# Patient Record
Sex: FEMALE | Race: OTHER | HISPANIC OR LATINO | URBAN - METROPOLITAN AREA
[De-identification: names, ages, dates, MRNs, and addresses within clinical notes are randomized per-mention and may not be internally consistent; named-entity substitution may affect disease eponyms.]

---

## 2019-11-12 ENCOUNTER — EMERGENCY (EMERGENCY)
Facility: HOSPITAL | Age: 59
LOS: 1 days | Discharge: HOME | End: 2019-11-12
Attending: STUDENT IN AN ORGANIZED HEALTH CARE EDUCATION/TRAINING PROGRAM
Payer: MEDICAID

## 2019-11-12 VITALS
OXYGEN SATURATION: 98 % | DIASTOLIC BLOOD PRESSURE: 100 MMHG | TEMPERATURE: 98 F | SYSTOLIC BLOOD PRESSURE: 199 MMHG | RESPIRATION RATE: 20 BRPM | HEART RATE: 107 BPM

## 2019-11-12 LAB
ALBUMIN SERPL ELPH-MCNC: 4.8 G/DL — SIGNIFICANT CHANGE UP (ref 3.3–5.2)
ALP SERPL-CCNC: 116 U/L — SIGNIFICANT CHANGE UP (ref 40–120)
ALT FLD-CCNC: 12 U/L — SIGNIFICANT CHANGE UP
ANION GAP SERPL CALC-SCNC: 14 MMOL/L — SIGNIFICANT CHANGE UP (ref 5–17)
APPEARANCE UR: CLEAR — SIGNIFICANT CHANGE UP
AST SERPL-CCNC: 19 U/L — SIGNIFICANT CHANGE UP
BACTERIA # UR AUTO: NEGATIVE — SIGNIFICANT CHANGE UP
BILIRUB SERPL-MCNC: 0.6 MG/DL — SIGNIFICANT CHANGE UP (ref 0.4–2)
BILIRUB UR-MCNC: NEGATIVE — SIGNIFICANT CHANGE UP
BUN SERPL-MCNC: 12 MG/DL — SIGNIFICANT CHANGE UP (ref 8–20)
CALCIUM SERPL-MCNC: 9.9 MG/DL — SIGNIFICANT CHANGE UP (ref 8.6–10.2)
CHLORIDE SERPL-SCNC: 100 MMOL/L — SIGNIFICANT CHANGE UP (ref 98–107)
CO2 SERPL-SCNC: 26 MMOL/L — SIGNIFICANT CHANGE UP (ref 22–29)
COLOR SPEC: YELLOW — SIGNIFICANT CHANGE UP
CREAT SERPL-MCNC: 0.62 MG/DL — SIGNIFICANT CHANGE UP (ref 0.5–1.3)
DIFF PNL FLD: ABNORMAL
EPI CELLS # UR: SIGNIFICANT CHANGE UP
GLUCOSE SERPL-MCNC: 148 MG/DL — HIGH (ref 70–115)
GLUCOSE UR QL: NEGATIVE MG/DL — SIGNIFICANT CHANGE UP
HCT VFR BLD CALC: 46.6 % — HIGH (ref 34.5–45)
HGB BLD-MCNC: 15.3 G/DL — SIGNIFICANT CHANGE UP (ref 11.5–15.5)
INR BLD: 1.09 RATIO — SIGNIFICANT CHANGE UP (ref 0.88–1.16)
KETONES UR-MCNC: NEGATIVE — SIGNIFICANT CHANGE UP
LEUKOCYTE ESTERASE UR-ACNC: NEGATIVE — SIGNIFICANT CHANGE UP
LIDOCAIN IGE QN: 27 U/L — SIGNIFICANT CHANGE UP (ref 22–51)
MCHC RBC-ENTMCNC: 28.4 PG — SIGNIFICANT CHANGE UP (ref 27–34)
MCHC RBC-ENTMCNC: 32.8 GM/DL — SIGNIFICANT CHANGE UP (ref 32–36)
MCV RBC AUTO: 86.5 FL — SIGNIFICANT CHANGE UP (ref 80–100)
NITRITE UR-MCNC: NEGATIVE — SIGNIFICANT CHANGE UP
PH UR: 8 — SIGNIFICANT CHANGE UP (ref 5–8)
PLATELET # BLD AUTO: 337 K/UL — SIGNIFICANT CHANGE UP (ref 150–400)
POTASSIUM SERPL-MCNC: 3.9 MMOL/L — SIGNIFICANT CHANGE UP (ref 3.5–5.3)
POTASSIUM SERPL-SCNC: 3.9 MMOL/L — SIGNIFICANT CHANGE UP (ref 3.5–5.3)
PROT SERPL-MCNC: 9.3 G/DL — HIGH (ref 6.6–8.7)
PROT UR-MCNC: 15 MG/DL
PROTHROM AB SERPL-ACNC: 12.6 SEC — SIGNIFICANT CHANGE UP (ref 10–12.9)
RBC # BLD: 5.39 M/UL — HIGH (ref 3.8–5.2)
RBC # FLD: 13.1 % — SIGNIFICANT CHANGE UP (ref 10.3–14.5)
RBC CASTS # UR COMP ASSIST: SIGNIFICANT CHANGE UP /HPF (ref 0–4)
SODIUM SERPL-SCNC: 140 MMOL/L — SIGNIFICANT CHANGE UP (ref 135–145)
SP GR SPEC: 1.01 — SIGNIFICANT CHANGE UP (ref 1.01–1.02)
TROPONIN T SERPL-MCNC: <0.01 NG/ML — SIGNIFICANT CHANGE UP (ref 0–0.06)
TSH SERPL-MCNC: 3.23 UIU/ML — SIGNIFICANT CHANGE UP (ref 0.27–4.2)
UROBILINOGEN FLD QL: NEGATIVE MG/DL — SIGNIFICANT CHANGE UP
WBC # BLD: 9.76 K/UL — SIGNIFICANT CHANGE UP (ref 3.8–10.5)
WBC # FLD AUTO: 9.76 K/UL — SIGNIFICANT CHANGE UP (ref 3.8–10.5)
WBC UR QL: SIGNIFICANT CHANGE UP

## 2019-11-12 PROCEDURE — 70450 CT HEAD/BRAIN W/O DYE: CPT | Mod: 26

## 2019-11-12 PROCEDURE — 99218: CPT

## 2019-11-12 PROCEDURE — 93010 ELECTROCARDIOGRAM REPORT: CPT

## 2019-11-12 PROCEDURE — 71046 X-RAY EXAM CHEST 2 VIEWS: CPT | Mod: 26

## 2019-11-12 PROCEDURE — 74174 CTA ABD&PLVS W/CONTRAST: CPT | Mod: 26

## 2019-11-12 PROCEDURE — 71275 CT ANGIOGRAPHY CHEST: CPT | Mod: 26

## 2019-11-12 RX ORDER — HYDRALAZINE HCL 50 MG
5 TABLET ORAL ONCE
Refills: 0 | Status: COMPLETED | OUTPATIENT
Start: 2019-11-12 | End: 2019-11-12

## 2019-11-12 RX ORDER — AMLODIPINE BESYLATE 2.5 MG/1
10 TABLET ORAL ONCE
Refills: 0 | Status: COMPLETED | OUTPATIENT
Start: 2019-11-12 | End: 2019-11-12

## 2019-11-12 RX ADMIN — AMLODIPINE BESYLATE 10 MILLIGRAM(S): 2.5 TABLET ORAL at 20:08

## 2019-11-12 RX ADMIN — Medication 5 MILLIGRAM(S): at 21:26

## 2019-11-12 NOTE — ED STATDOCS - NS_ ATTENDINGSCRIBEDETAILS _ED_A_ED_FT
I, Kwaku Rosales, performed the initial face to face bedside interview with this patient regarding history of present illness, review of symptoms and relevant past medical, social and family history.  I completed an independent physical examination.   The history, relevant review of systems, past medical and surgical history, medical decision making, and physical examination was documented by the scribe in my presence and I attest to the accuracy of the documentation.

## 2019-11-12 NOTE — ED PROVIDER NOTE - NEURO NEGATIVE STATEMENT, MLM
+ dizziness at times, no loss of consciousness, no gait abnormality, no headache, no sensory deficits, and no weakness.

## 2019-11-12 NOTE — ED PROVIDER NOTE - OBJECTIVE STATEMENT
59 year old female coming from [facility] after finding out she had a blood pressure of 220/124 presenting to the ED c/o dizziness. Pt states she has not seen a doctor [place], [place]. States she went [facility] for her routine evaluation and  they found her to have elevated blood pressure. Pt notes over last several months she has been having episodes of posterior neck pain and upper back pain that radiates to both her chest. Pt states the pain is sporadic, nothing makes it better or worse, comes on and resolves. Pt currently denies any symptoms. Denies fevers, HA, dizziness, blurry vision, nausea, vomiting, neck pain, back pain, chest pain, no SOB, edema, numbness or tingling, focal weakness. 59 year old female coming from [facility] after finding out she had a blood pressure of 220/124 presenting to the ED c/o dizziness. Pt states she has not seen a doctor since she has been in the country, which has been over 10 years. States she went  to the [facility] center for her routine evaluation and  they found her to have elevated blood pressure. Pt notes over last several months she has been having episodes of posterior neck pain and upper back pain that radiates to both her chest. Pt states the pain is sporadic, nothing makes it better or worse, comes on and resolves. Pt currently denies any symptoms. Denies fevers, HA, dizziness, blurry vision, nausea, vomiting, neck pain, back pain, chest pain, no SOB, edema, numbness or tingling, focal weakness.

## 2019-11-12 NOTE — ED ADULT TRIAGE NOTE - CHIEF COMPLAINT QUOTE
sent from clinic for  high blood pressure bilateral upper extremities/normal sent from clinic for  high blood pressure, c/o back and neck pain

## 2019-11-12 NOTE — ED STATDOCS - PROGRESS NOTE DETAILS
58 y/o F pt with no significant PMHx presents to the ED due to Hypertension and back pain. Pt states that she went to a clinic today and was told she has increasing high blood pressure. Pt is not currently on medication for her Hypertension. Pt paraspinal and neck tenderness. Pt will be sent to [facility] for monitoring.

## 2019-11-12 NOTE — ED PROVIDER NOTE - CLINICAL SUMMARY MEDICAL DECISION MAKING FREE TEXT BOX
Will perform blood work, EKG, CT scan on pt as well as give medication for patient's blood pressure.

## 2019-11-12 NOTE — ED CDU PROVIDER INITIAL DAY NOTE - OBJECTIVE STATEMENT
59 year old female coming from [facility] after finding out she had a blood pressure of 220/124 presenting to the ED c/o dizziness. Pt states she has not seen a doctor since she has been [place] the country, which has been over 10 years. States she went [facility] for her routine evaluation and  they found her to have elevated blood pressure. Pt notes over last several months she has been having episodes of posterior neck pain and upper back pain that radiates to both her chest. Pt states the pain is sporadic, nothing makes it better or worse, comes on and resolves. Pt currently denies any symptoms. Denies fevers, HA, dizziness, blurry vision, nausea, vomiting, neck pain, back pain, chest pain, no SOB, edema, numbness or tingling, focal weakness.

## 2019-11-13 VITALS
HEART RATE: 96 BPM | SYSTOLIC BLOOD PRESSURE: 196 MMHG | OXYGEN SATURATION: 98 % | DIASTOLIC BLOOD PRESSURE: 98 MMHG | RESPIRATION RATE: 18 BRPM

## 2019-11-13 PROCEDURE — 99291 CRITICAL CARE FIRST HOUR: CPT | Mod: 25

## 2019-11-13 PROCEDURE — 71046 X-RAY EXAM CHEST 2 VIEWS: CPT

## 2019-11-13 PROCEDURE — 71275 CT ANGIOGRAPHY CHEST: CPT

## 2019-11-13 PROCEDURE — 96374 THER/PROPH/DIAG INJ IV PUSH: CPT | Mod: XU

## 2019-11-13 PROCEDURE — 85610 PROTHROMBIN TIME: CPT

## 2019-11-13 PROCEDURE — 83690 ASSAY OF LIPASE: CPT

## 2019-11-13 PROCEDURE — 74174 CTA ABD&PLVS W/CONTRAST: CPT

## 2019-11-13 PROCEDURE — 99217: CPT

## 2019-11-13 PROCEDURE — 70450 CT HEAD/BRAIN W/O DYE: CPT

## 2019-11-13 PROCEDURE — T1013: CPT

## 2019-11-13 PROCEDURE — 81001 URINALYSIS AUTO W/SCOPE: CPT

## 2019-11-13 PROCEDURE — 84484 ASSAY OF TROPONIN QUANT: CPT

## 2019-11-13 PROCEDURE — 85027 COMPLETE CBC AUTOMATED: CPT

## 2019-11-13 PROCEDURE — 80053 COMPREHEN METABOLIC PANEL: CPT

## 2019-11-13 PROCEDURE — 36415 COLL VENOUS BLD VENIPUNCTURE: CPT

## 2019-11-13 PROCEDURE — 84443 ASSAY THYROID STIM HORMONE: CPT

## 2019-11-13 PROCEDURE — 93005 ELECTROCARDIOGRAM TRACING: CPT

## 2019-11-13 RX ORDER — AMLODIPINE BESYLATE 2.5 MG/1
1 TABLET ORAL
Qty: 21 | Refills: 0
Start: 2019-11-13 | End: 2019-12-03

## 2019-11-13 NOTE — ED CDU PROVIDER DISPOSITION NOTE - CLINICAL COURSE
Patient continues to remain comfortable. Patient re-evaluated with the assistance of Danelle. Informed of labs and CT findings and importance of taking medication and follow-up of nodule.

## 2019-11-13 NOTE — ED ADULT NURSE NOTE - OBJECTIVE STATEMENT
Pt comes in for hypertension. Pt VS as noted. Pt denies any chest pain or shortness of breath, denies any pain or discomfort, denies vision changes or headache. Pt ambulatory and independent with steady gait noted.

## 2019-11-13 NOTE — ED CDU PROVIDER DISPOSITION NOTE - PATIENT PORTAL LINK FT
You can access the FollowMyHealth Patient Portal offered by [facility] by registering at the following website: [email or web address]. By joining [facility]’s FollowMyHealth portal, you will also be able to view your health information using other applications (apps) compatible with our system.

## 2019-11-21 NOTE — ED CDU PROVIDER SUBSEQUENT DAY NOTE - MEDICAL DECISION MAKING DETAILS
Patient with likely long standing uncontrolled bp. BP has iproved. Will initiate long term management.

## 2019-12-01 ENCOUNTER — OUTPATIENT (OUTPATIENT)
Dept: OUTPATIENT SERVICES | Facility: HOSPITAL | Age: 59
LOS: 1 days | End: 2019-12-01
Payer: MEDICAID

## 2019-12-01 PROCEDURE — G9001: CPT

## 2019-12-09 DIAGNOSIS — Z71.89 OTHER SPECIFIED COUNSELING: ICD-10-CM

## 2020-01-17 PROBLEM — Z00.00 ENCOUNTER FOR PREVENTIVE HEALTH EXAMINATION: Status: ACTIVE | Noted: 2020-01-17

## 2020-01-21 ENCOUNTER — APPOINTMENT (OUTPATIENT)
Dept: PULMONOLOGY | Facility: CLINIC | Age: 60
End: 2020-01-21

## 2020-03-06 ENCOUNTER — APPOINTMENT (OUTPATIENT)
Dept: PULMONOLOGY | Facility: CLINIC | Age: 60
End: 2020-03-06
Payer: MEDICAID

## 2020-03-06 VITALS — RESPIRATION RATE: 16 BRPM

## 2020-03-06 VITALS
WEIGHT: 192 LBS | DIASTOLIC BLOOD PRESSURE: 82 MMHG | SYSTOLIC BLOOD PRESSURE: 158 MMHG | BODY MASS INDEX: 36.25 KG/M2 | HEIGHT: 61 IN | OXYGEN SATURATION: 99 % | HEART RATE: 105 BPM

## 2020-03-06 DIAGNOSIS — G47.33 OBSTRUCTIVE SLEEP APNEA (ADULT) (PEDIATRIC): ICD-10-CM

## 2020-03-06 DIAGNOSIS — R93.89 ABNORMAL FINDINGS ON DIAGNOSTIC IMAGING OF OTHER SPECIFIED BODY STRUCTURES: ICD-10-CM

## 2020-03-06 DIAGNOSIS — Z86.39 PERSONAL HISTORY OF OTHER ENDOCRINE, NUTRITIONAL AND METABOLIC DISEASE: ICD-10-CM

## 2020-03-06 PROCEDURE — 99204 OFFICE O/P NEW MOD 45 MIN: CPT

## 2020-03-06 NOTE — HISTORY OF PRESENT ILLNESS
[TextBox_4] : A 60-year-old  female who comes for evaluation of an abnormal chest CT scan. She was seen in the [facility] emergency room in November for hypertensive crisis. At the time she was sent for a CT angiogram of the chest to rule out aortic dissection. No dissection was found and she was noted to have a 5 mm right middle lobe subpleural nodule. The patient is from [facility] and is a nonsmoker. She denies known TB exposure. She denies shortness of breath cough or wheeze. There is loud snoring and there is some excessive daytime sleepiness.

## 2020-03-06 NOTE — REASON FOR VISIT
[Consultation] : a consultation [Abnormal CXR/ Chest CT] : an abnormal CXR/ chest CT [Sleep Apnea] : sleep apnea [[employer] ] : provided by [employer]   [FreeTextEntry1] : [id number] [FreeTextEntry2] : Licha [TWNoteComboBox1] : English

## 2020-03-06 NOTE — PHYSICAL EXAM
[No Acute Distress] : no acute distress [Low Lying Soft Palate] : low lying soft palate [Enlarged Base of the Tongue] : enlarged base of the tongue [III] : Mallampati Class: III [Normal Appearance] : normal appearance [No Neck Mass] : no neck mass [Normal Rate/Rhythm] : normal rate/rhythm [Normal S1, S2] : normal s1, s2 [No Murmurs] : no murmurs [No Resp Distress] : no resp distress [Clear to Auscultation Bilaterally] : clear to auscultation bilaterally [No Abnormalities] : no abnormalities [Benign] : benign [Normal Gait] : normal gait [No Clubbing] : no clubbing [No Cyanosis] : no cyanosis [No Edema] : no edema [FROM] : FROM [Normal Color/ Pigmentation] : normal color/ pigmentation [No Focal Deficits] : no focal deficits [Oriented x3] : oriented x3 [Normal Affect] : normal affect [TextBox_2] : Obese

## 2020-03-06 NOTE — ASSESSMENT
[FreeTextEntry1] : Patient's nodule appears benign. Is probably related to old environmental exposure from [place]. There is no evidence for active tuberculosis and in view of her age prophylaxis would not be given.  She is at risk for obstructive sleep apnea especially with hypertension that went out of control. A sleep study has been ordered and I will see her back after that.

## 2020-03-23 ENCOUNTER — APPOINTMENT (OUTPATIENT)
Dept: CARDIOLOGY | Facility: CLINIC | Age: 60
End: 2020-03-23
Payer: MEDICAID

## 2020-03-23 ENCOUNTER — NON-APPOINTMENT (OUTPATIENT)
Age: 60
End: 2020-03-23

## 2020-03-23 VITALS
BODY MASS INDEX: 36.63 KG/M2 | DIASTOLIC BLOOD PRESSURE: 92 MMHG | SYSTOLIC BLOOD PRESSURE: 186 MMHG | TEMPERATURE: 98.2 F | HEIGHT: 61 IN | WEIGHT: 194 LBS | OXYGEN SATURATION: 96 % | HEART RATE: 112 BPM

## 2020-03-23 VITALS — DIASTOLIC BLOOD PRESSURE: 90 MMHG | SYSTOLIC BLOOD PRESSURE: 170 MMHG

## 2020-03-23 VITALS — DIASTOLIC BLOOD PRESSURE: 90 MMHG | SYSTOLIC BLOOD PRESSURE: 180 MMHG

## 2020-03-23 DIAGNOSIS — Z82.49 FAMILY HISTORY OF ISCHEMIC HEART DISEASE AND OTHER DISEASES OF THE CIRCULATORY SYSTEM: ICD-10-CM

## 2020-03-23 DIAGNOSIS — Z72.3 LACK OF PHYSICAL EXERCISE: ICD-10-CM

## 2020-03-23 DIAGNOSIS — Z78.9 OTHER SPECIFIED HEALTH STATUS: ICD-10-CM

## 2020-03-23 DIAGNOSIS — Z01.818 ENCOUNTER FOR OTHER PREPROCEDURAL EXAMINATION: ICD-10-CM

## 2020-03-23 DIAGNOSIS — Z56.0 UNEMPLOYMENT, UNSPECIFIED: ICD-10-CM

## 2020-03-23 PROCEDURE — 99204 OFFICE O/P NEW MOD 45 MIN: CPT

## 2020-03-23 PROCEDURE — 93000 ELECTROCARDIOGRAM COMPLETE: CPT

## 2020-03-23 SDOH — ECONOMIC STABILITY - INCOME SECURITY: UNEMPLOYMENT, UNSPECIFIED: Z56.0

## 2020-05-18 ENCOUNTER — APPOINTMENT (OUTPATIENT)
Dept: CARDIOLOGY | Facility: CLINIC | Age: 60
End: 2020-05-18
Payer: MEDICAID

## 2020-05-18 PROCEDURE — 78452 HT MUSCLE IMAGE SPECT MULT: CPT

## 2020-05-18 PROCEDURE — 93306 TTE W/DOPPLER COMPLETE: CPT

## 2020-05-18 PROCEDURE — 93015 CV STRESS TEST SUPVJ I&R: CPT

## 2020-05-18 PROCEDURE — A9500: CPT

## 2020-05-19 ENCOUNTER — APPOINTMENT (OUTPATIENT)
Dept: CARDIOLOGY | Facility: CLINIC | Age: 60
End: 2020-05-19

## 2020-05-19 DIAGNOSIS — R94.39 ABNORMAL RESULT OF OTHER CARDIOVASCULAR FUNCTION STUDY: ICD-10-CM

## 2020-05-21 PROBLEM — Z01.818 PREOP TESTING: Status: ACTIVE | Noted: 2020-05-21

## 2020-05-21 NOTE — HISTORY OF PRESENT ILLNESS
[FreeTextEntry1] : Translation Provided by Ruby (Our office staff)\par \par HTN\par BP uncontrol.\par Today's BP is elevated. Yet pt states she hasn't taken her BP meds yet. She didn't refill her medications yet.\par \par Pt is on:\par Amlodipine 10 mg daily. \par Lisinopril 40 mg daily\par Metoprolol ER 50 mg daily\par \par Pt hasn’t taken any of them today.\par Pt states in general she is compliant with medications.\par Pt has 11 years h/o HTN on treatment.\par Pt is unable to specify medication history about when meds were started and was added.\par She admits RUBIN. \par Denied dietary indiscretion. \par Denied weight gain. \par Denied  dizziness, orthopnea, pnd, edema, cp, palpitation, nausea, moving, hematuria, melena, syncope,near syncope.\par \par FUNCTIONAL CAPACITY\par Estimated <4.0 METS\par \par CHRONIC, STABLE CONDITIONS\par Abnormal chest CT scan. :5 mm right middle lobe subpleural nodule. \par KIRAN\par DM\par \par

## 2020-05-25 ENCOUNTER — APPOINTMENT (OUTPATIENT)
Dept: DISASTER EMERGENCY | Facility: CLINIC | Age: 60
End: 2020-05-25

## 2020-06-08 PROBLEM — I10 ESSENTIAL (PRIMARY) HYPERTENSION: Chronic | Status: ACTIVE | Noted: 2020-05-27

## 2020-06-08 PROBLEM — E11.9 TYPE 2 DIABETES MELLITUS WITHOUT COMPLICATIONS: Chronic | Status: ACTIVE | Noted: 2020-05-27

## 2020-06-08 PROBLEM — E66.9 OBESITY, UNSPECIFIED: Chronic | Status: ACTIVE | Noted: 2020-05-27

## 2020-06-08 PROBLEM — E78.5 HYPERLIPIDEMIA, UNSPECIFIED: Chronic | Status: ACTIVE | Noted: 2020-05-27

## 2020-06-09 ENCOUNTER — APPOINTMENT (OUTPATIENT)
Dept: DISASTER EMERGENCY | Facility: CLINIC | Age: 60
End: 2020-06-09

## 2020-06-10 LAB — SARS-COV-2 N GENE NPH QL NAA+PROBE: DETECTED

## 2020-06-16 ENCOUNTER — INPATIENT (INPATIENT)
Facility: HOSPITAL | Age: 60
LOS: 2 days | Discharge: HOME | DRG: 246 | End: 2020-06-19
Attending: HOSPITALIST | Admitting: FAMILY MEDICINE
Payer: MEDICAID

## 2020-06-16 VITALS
RESPIRATION RATE: 22 BRPM | HEART RATE: 115 BPM | WEIGHT: 184.97 LBS | OXYGEN SATURATION: 97 % | DIASTOLIC BLOOD PRESSURE: 107 MMHG | SYSTOLIC BLOOD PRESSURE: 235 MMHG | HEIGHT: 66 IN | TEMPERATURE: 98 F

## 2020-06-16 DIAGNOSIS — R07.9 CHEST PAIN, UNSPECIFIED: ICD-10-CM

## 2020-06-16 DIAGNOSIS — R09.89 OTHER SPECIFIED SYMPTOMS AND SIGNS INVOLVING THE CIRCULATORY AND RESPIRATORY SYSTEMS: ICD-10-CM

## 2020-06-16 LAB
ALBUMIN SERPL ELPH-MCNC: 4.4 G/DL — SIGNIFICANT CHANGE UP (ref 3.3–5.2)
ALP SERPL-CCNC: 124 U/L — HIGH (ref 40–120)
ALT FLD-CCNC: 12 U/L — SIGNIFICANT CHANGE UP
ANION GAP SERPL CALC-SCNC: 13 MMOL/L — SIGNIFICANT CHANGE UP (ref 5–17)
APTT BLD: 28.6 SEC — SIGNIFICANT CHANGE UP (ref 27.5–36.3)
AST SERPL-CCNC: 18 U/L — SIGNIFICANT CHANGE UP
BASOPHILS # BLD AUTO: 0.04 K/UL — SIGNIFICANT CHANGE UP (ref 0–0.2)
BASOPHILS NFR BLD AUTO: 0.5 % — SIGNIFICANT CHANGE UP (ref 0–2)
BILIRUB SERPL-MCNC: 1 MG/DL — SIGNIFICANT CHANGE UP (ref 0.4–2)
BLD GP AB SCN SERPL QL: SIGNIFICANT CHANGE UP
BUN SERPL-MCNC: 10 MG/DL — SIGNIFICANT CHANGE UP (ref 8–20)
CALCIUM SERPL-MCNC: 9.4 MG/DL — SIGNIFICANT CHANGE UP (ref 8.6–10.2)
CHLORIDE SERPL-SCNC: 101 MMOL/L — SIGNIFICANT CHANGE UP (ref 98–107)
CO2 SERPL-SCNC: 25 MMOL/L — SIGNIFICANT CHANGE UP (ref 22–29)
CREAT SERPL-MCNC: 0.54 MG/DL — SIGNIFICANT CHANGE UP (ref 0.5–1.3)
EOSINOPHIL # BLD AUTO: 0.08 K/UL — SIGNIFICANT CHANGE UP (ref 0–0.5)
EOSINOPHIL NFR BLD AUTO: 0.9 % — SIGNIFICANT CHANGE UP (ref 0–6)
GLUCOSE BLDC GLUCOMTR-MCNC: 123 MG/DL — HIGH (ref 70–99)
GLUCOSE SERPL-MCNC: 121 MG/DL — HIGH (ref 70–99)
HCT VFR BLD CALC: 43.6 % — SIGNIFICANT CHANGE UP (ref 34.5–45)
HGB BLD-MCNC: 14.2 G/DL — SIGNIFICANT CHANGE UP (ref 11.5–15.5)
IMM GRANULOCYTES NFR BLD AUTO: 0.3 % — SIGNIFICANT CHANGE UP (ref 0–1.5)
INR BLD: 1.13 RATIO — SIGNIFICANT CHANGE UP (ref 0.88–1.16)
LYMPHOCYTES # BLD AUTO: 1.73 K/UL — SIGNIFICANT CHANGE UP (ref 1–3.3)
LYMPHOCYTES # BLD AUTO: 19.5 % — SIGNIFICANT CHANGE UP (ref 13–44)
MAGNESIUM SERPL-MCNC: 2.1 MG/DL — SIGNIFICANT CHANGE UP (ref 1.6–2.6)
MCHC RBC-ENTMCNC: 28.5 PG — SIGNIFICANT CHANGE UP (ref 27–34)
MCHC RBC-ENTMCNC: 32.6 GM/DL — SIGNIFICANT CHANGE UP (ref 32–36)
MCV RBC AUTO: 87.6 FL — SIGNIFICANT CHANGE UP (ref 80–100)
MONOCYTES # BLD AUTO: 0.46 K/UL — SIGNIFICANT CHANGE UP (ref 0–0.9)
MONOCYTES NFR BLD AUTO: 5.2 % — SIGNIFICANT CHANGE UP (ref 2–14)
NEUTROPHILS # BLD AUTO: 6.51 K/UL — SIGNIFICANT CHANGE UP (ref 1.8–7.4)
NEUTROPHILS NFR BLD AUTO: 73.6 % — SIGNIFICANT CHANGE UP (ref 43–77)
NT-PROBNP SERPL-SCNC: 272 PG/ML — SIGNIFICANT CHANGE UP (ref 0–300)
PLATELET # BLD AUTO: 293 K/UL — SIGNIFICANT CHANGE UP (ref 150–400)
POTASSIUM SERPL-MCNC: 3.9 MMOL/L — SIGNIFICANT CHANGE UP (ref 3.5–5.3)
POTASSIUM SERPL-SCNC: 3.9 MMOL/L — SIGNIFICANT CHANGE UP (ref 3.5–5.3)
PROT SERPL-MCNC: 8.3 G/DL — SIGNIFICANT CHANGE UP (ref 6.6–8.7)
PROTHROM AB SERPL-ACNC: 12.8 SEC — SIGNIFICANT CHANGE UP (ref 10–12.9)
RBC # BLD: 4.98 M/UL — SIGNIFICANT CHANGE UP (ref 3.8–5.2)
RBC # FLD: 13.1 % — SIGNIFICANT CHANGE UP (ref 10.3–14.5)
SODIUM SERPL-SCNC: 139 MMOL/L — SIGNIFICANT CHANGE UP (ref 135–145)
TROPONIN T SERPL-MCNC: <0.01 NG/ML — SIGNIFICANT CHANGE UP (ref 0–0.06)
WBC # BLD: 8.85 K/UL — SIGNIFICANT CHANGE UP (ref 3.8–10.5)
WBC # FLD AUTO: 8.85 K/UL — SIGNIFICANT CHANGE UP (ref 3.8–10.5)

## 2020-06-16 PROCEDURE — 71045 X-RAY EXAM CHEST 1 VIEW: CPT | Mod: 26

## 2020-06-16 PROCEDURE — 93970 EXTREMITY STUDY: CPT | Mod: 26

## 2020-06-16 PROCEDURE — 99223 1ST HOSP IP/OBS HIGH 75: CPT

## 2020-06-16 PROCEDURE — 71275 CT ANGIOGRAPHY CHEST: CPT | Mod: 26

## 2020-06-16 PROCEDURE — 99285 EMERGENCY DEPT VISIT HI MDM: CPT

## 2020-06-16 RX ORDER — LABETALOL HCL 100 MG
10 TABLET ORAL ONCE
Refills: 0 | Status: COMPLETED | OUTPATIENT
Start: 2020-06-16 | End: 2020-06-16

## 2020-06-16 RX ORDER — SODIUM CHLORIDE 9 MG/ML
1000 INJECTION, SOLUTION INTRAVENOUS
Refills: 0 | Status: DISCONTINUED | OUTPATIENT
Start: 2020-06-16 | End: 2020-06-19

## 2020-06-16 RX ORDER — LABETALOL HCL 100 MG
10 TABLET ORAL EVERY 6 HOURS
Refills: 0 | Status: DISCONTINUED | OUTPATIENT
Start: 2020-06-16 | End: 2020-06-17

## 2020-06-16 RX ORDER — DEXTROSE 50 % IN WATER 50 %
25 SYRINGE (ML) INTRAVENOUS ONCE
Refills: 0 | Status: DISCONTINUED | OUTPATIENT
Start: 2020-06-16 | End: 2020-06-19

## 2020-06-16 RX ORDER — DEXTROSE 50 % IN WATER 50 %
12.5 SYRINGE (ML) INTRAVENOUS ONCE
Refills: 0 | Status: DISCONTINUED | OUTPATIENT
Start: 2020-06-16 | End: 2020-06-19

## 2020-06-16 RX ORDER — INSULIN LISPRO 100/ML
VIAL (ML) SUBCUTANEOUS AT BEDTIME
Refills: 0 | Status: DISCONTINUED | OUTPATIENT
Start: 2020-06-16 | End: 2020-06-19

## 2020-06-16 RX ORDER — NITROGLYCERIN 6.5 MG
0.4 CAPSULE, EXTENDED RELEASE ORAL
Refills: 0 | Status: DISCONTINUED | OUTPATIENT
Start: 2020-06-16 | End: 2020-06-19

## 2020-06-16 RX ORDER — GLUCAGON INJECTION, SOLUTION 0.5 MG/.1ML
1 INJECTION, SOLUTION SUBCUTANEOUS ONCE
Refills: 0 | Status: DISCONTINUED | OUTPATIENT
Start: 2020-06-16 | End: 2020-06-19

## 2020-06-16 RX ORDER — ASPIRIN/CALCIUM CARB/MAGNESIUM 324 MG
81 TABLET ORAL DAILY
Refills: 0 | Status: DISCONTINUED | OUTPATIENT
Start: 2020-06-16 | End: 2020-06-19

## 2020-06-16 RX ORDER — ASPIRIN/CALCIUM CARB/MAGNESIUM 324 MG
325 TABLET ORAL ONCE
Refills: 0 | Status: COMPLETED | OUTPATIENT
Start: 2020-06-16 | End: 2020-06-16

## 2020-06-16 RX ORDER — ATORVASTATIN CALCIUM 80 MG/1
40 TABLET, FILM COATED ORAL AT BEDTIME
Refills: 0 | Status: DISCONTINUED | OUTPATIENT
Start: 2020-06-16 | End: 2020-06-19

## 2020-06-16 RX ORDER — DEXTROSE 50 % IN WATER 50 %
15 SYRINGE (ML) INTRAVENOUS ONCE
Refills: 0 | Status: DISCONTINUED | OUTPATIENT
Start: 2020-06-16 | End: 2020-06-19

## 2020-06-16 RX ORDER — ENOXAPARIN SODIUM 100 MG/ML
40 INJECTION SUBCUTANEOUS DAILY
Refills: 0 | Status: DISCONTINUED | OUTPATIENT
Start: 2020-06-17 | End: 2020-06-17

## 2020-06-16 RX ORDER — INSULIN LISPRO 100/ML
VIAL (ML) SUBCUTANEOUS
Refills: 0 | Status: DISCONTINUED | OUTPATIENT
Start: 2020-06-16 | End: 2020-06-19

## 2020-06-16 RX ORDER — METOPROLOL TARTRATE 50 MG
25 TABLET ORAL EVERY 8 HOURS
Refills: 0 | Status: DISCONTINUED | OUTPATIENT
Start: 2020-06-16 | End: 2020-06-19

## 2020-06-16 RX ADMIN — ATORVASTATIN CALCIUM 40 MILLIGRAM(S): 80 TABLET, FILM COATED ORAL at 20:41

## 2020-06-16 RX ADMIN — Medication 325 MILLIGRAM(S): at 15:41

## 2020-06-16 RX ADMIN — Medication 10 MILLIGRAM(S): at 19:00

## 2020-06-16 RX ADMIN — Medication 10 MILLIGRAM(S): at 21:08

## 2020-06-16 RX ADMIN — Medication 25 MILLIGRAM(S): at 20:42

## 2020-06-16 RX ADMIN — Medication 5 MILLIGRAM(S): at 21:07

## 2020-06-16 RX ADMIN — Medication 10 MILLIGRAM(S): at 15:10

## 2020-06-16 NOTE — CONSULT NOTE ADULT - ASSESSMENT
Due to the nature of this patient's COVID-19 isolation status, no bedside physical exam done to limit spread of infection.  Examination highlights were provided by bedside nurse. Objective data were reviewed in detail. As per chart, Pt is a "60y F w/ hx HTN, HLD, DM, presenting for chest pain.  Pt states that for the past 4 days, she has been having intermittent exertional chest pain described as non-radiating pressure sensation, associated with dyspnea.  Also complains of intermittent headache.  Denies n/v, diaphoresis, palpitations, vision changes, numbness, tingling, weakness.  Denies any symptoms at this time.  Pt was seen at [facility] today, and was sent to the ED for further evaluation.  States that she has been compliant with her medications; did not take her medications yet today.  Of note, pt had symptoms of decreased taste/smell in April 2020, now resolved, and recently tested positive for COVID-19.  Follows with [facility]; had recent echo done". Pt currently in ED, denies current chest pain. ECG-ST HR @ 117 with no ischemic changes, Trop#1-negative, BNP-272, Xray- heart enlarged, slight new congestion. Recently, pt completed stress which showed 5/2020- EF 54%, ischemic ECG changes, severe ischemia in p.LAD territory, mild ischemia in the RCA territory.     Chest Pain-CAD  -Trop#1-negative  -ECG- ST HR @ 117 with no ischemic changes  -Echo-5/2020- EF 50-55%, grade I diastolic dysfx., moderate LVH, normal RV size and fx.,  PA pressure 37.8 borderline pulm. HTN  -Stress- 5/2020- EF 54%, ischemic ECG changes, severe ischemia in p.LAD territory, mild ischemia in the RCA territory  -Cont. to trend trop.  -TSH-ordered  -D-dimer-r/o PE/DVT  -US lower ext.-r/o DVT  -Fasting Lipid Panel in AM  -If D-dimer elevated order CT Angio Chest to r/o PE  -Plan for eventual cath when patient stable in terms of COVID  -Start ASA 81MG (start tomorrow), Lopressor 25mg q 8HR, Lipitor 40mg Due to the nature of this patient's COVID-19 isolation status, no bedside physical exam done to limit spread of infection.  Examination highlights were provided by bedside nurse. Objective data were reviewed in detail. As per chart, Pt is a "60y F w/ hx HTN, HLD, DM, presenting for chest pain.  Pt states that for the past 4 days, she has been having intermittent exertional chest pain described as non-radiating pressure sensation, associated with dyspnea.  Also complains of intermittent headache.  Denies n/v, diaphoresis, palpitations, vision changes, numbness, tingling, weakness.  Denies any symptoms at this time.  Pt was seen at [facility] today, and was sent to the ED for further evaluation.  States that she has been compliant with her medications; did not take her medications yet today.  Of note, pt had symptoms of decreased taste/smell in April 2020, now resolved, and recently tested positive for COVID-19.  Follows with [facility]; had recent echo done". Pt currently in ED, denies current chest pain. ECG-ST HR @ 117 with no ischemic changes, Trop#1-negative, BNP-272, Xray- heart enlarged, slight new congestion. Recently, pt completed stress which showed 5/2020- EF 54%, ischemic ECG changes, severe ischemia in p.LAD territory, mild ischemia in the RCA territory.     Chest Pain-CAD  -Trop#1-negative  -ECG- ST HR @ 117 with no ischemic changes  -Echo-5/2020- EF 50-55%, grade I diastolic dysfx., moderate LVH, normal RV size and fx.,  PA pressure 37.8 borderline pulm. HTN  -Stress- 5/2020- EF 54%, ischemic ECG changes, severe ischemia in p.LAD territory, mild ischemia in the RCA territory  -Cont. to trend trop.  -TSH-ordered  -D-dimer-r/o PE/DVT  -US lower ext.-r/o DVT  -Fasting Lipid Panel in AM  -If D-dimer elevated order CT Angio Chest to r/o PE  -Plan for eventual cath when patient stable in terms of COVID  -Start ASA 81MG (start tomorrow), Lopressor 25mg q 8HR, Lipitor 40mg       COVID19- Positive on 6/10/2020        Elevated D dimer  - Chest CTA for PE to be done.

## 2020-06-16 NOTE — ED PROVIDER NOTE - OBJECTIVE STATEMENT
60y F w/ hx HTN, HLD, DM, presenting for chest pain.  Pt states that for the past 4 days, she has been having intermittent exertional chest pain described as non-radiating pressure sensation, associated with dyspnea.  Also complains of intermittent headache.  Denies n/v, diaphoresis, palpitations, vision changes, numbness, tingling, weakness.  Denies any symptoms at this time.  Pt was seen at [facility] today, and was sent to the ED for further evaluation.  States that she has been compliant with her medications; did not take her medications yet today.  Of note, pt had symptoms of decreased taste/smell in April 2020, now resolved, and recently tested positive for COVID-19.  Follows with [facility]; had recent echo done.

## 2020-06-16 NOTE — ED ADULT TRIAGE NOTE - CHIEF COMPLAINT QUOTE
Patient A&Ox4 complaining of chest pain x3 days, describes as pressure-like & stated gets short of breath when she walks.

## 2020-06-16 NOTE — CONSULT NOTE ADULT - ATTENDING COMMENTS
Patient seen and not examined because of +COVID19 on 6/10/2020.  I have discussed my recommendation with the PA which are outlined above.  Patient is currently not having any angina symptoms.  Will need LHC eventually.    Patient to be followed by Dr Ochoa.

## 2020-06-16 NOTE — ED PROVIDER NOTE - PHYSICAL EXAMINATION
Constitutional: Awake, alert, in no acute distress  Eyes: no scleral icterus  HENT: normocephalic, atraumatic, moist oral mucosa  CV: +tachycardia, regular rhythm, no murmur  Pulm: non-labored respirations, CTAB  Abdomen: soft, non-tender, non-distended  Extremities: no edema, no deformity  Skin: no rash, no jaundice  Neuro: AAOx3, no facial asymmetry, moving all extremities equally, no focal deficits

## 2020-06-16 NOTE — CONSULT NOTE ADULT - SUBJECTIVE AND OBJECTIVE BOX
[facility]                                                               Office:  [address] [address]                                                              Telephone: [phone]. Fax:[phone]                                                                        CARDIOLOGY CONSULTATION NOTE                                                                                             Consult requested by:  Dr. Rod  Reason for Consultation: Chest Pain  History obtained by: Patient and medical record   obtained: No    Chief complaint:    Patient is a 60y old  Female who presents with a chief complaint of       HPI: Due to the nature of this patient's COVID-19 isolation status, no bedside physical exam done to limit spread of infection.  Examination highlights were provided by bedside nurse. Objective data were reviewed in detail. As per chart, Pt is a "60y F w/ hx HTN, HLD, DM, presenting for chest pain.  Pt states that for the past 4 days, she has been having intermittent exertional chest pain described as non-radiating pressure sensation, associated with dyspnea.  Also complains of intermittent headache.  Denies n/v, diaphoresis, palpitations, vision changes, numbness, tingling, weakness.  Denies any symptoms at this time.  Pt was seen at [facility] today, and was sent to the ED for further evaluation.  States that she has been compliant with her medications; did not take her medications yet today.  Of note, pt had symptoms of decreased taste/smell in April 2020, now resolved, and recently tested positive for COVID-19.  Follows with [facility]; had recent echo done". Pt currently in ED, denies current chest pain. ECG-ST HR @ 117 with no ischemic changes, Trop#1-negative, BNP-272, Xray- heart enlarged, slight new congestion. Recently, pt completed stress which showed 5/2020- EF 54%, ischemic ECG changes, severe ischemia in p.LAD territory, mild ischemia in the RCA territory.         REVIEW OF SYMPTOMS:     CONSTITUTIONAL: No fever, weight loss, or fatigue  ENMT:  No difficulty hearing, tinnitus, vertigo; No sinus or throat pain  NECK: No pain or stiffness  CARDIOVASCULAR: See HPI  RESPIRATORY: No Dyspnea on exertion, Shortness of breath, cough, wheezing  : No dysuria, no hematuria   GI: No dark color stool, no melena, no diarrhea, no constipation, no abdominal pain   NEURO: No headache, no dizziness, no slurred speech   MUSCULOSKELETAL: No joint pain or swelling; No muscle, back, or extremity pain  PSYCH: No agitation, no anxiety.    ALL OTHER REVIEW OF SYSTEMS ARE NEGATIVE.      PREVIOUS DIAGNOSTIC TESTING  ECHO FINDINGS: 5/2020- EF 50-55%, grade I diastolic dysfx., moderate LVH, normal RV size and fx.,  PA pressure 37.8 borderline pulm. HTN      STRESS FINDINGS: 5/2020- EF 54%, ischemic ECG changes, severe ischemia in p.LAD territory, mild ischemia in the RCA territory      CATHETERIZATION FINDINGS: eventual         ALLERGIES: Allergies    penicillin (Rash)    Intolerances          PAST MEDICAL HISTORY  Obesity  Hyperlipidemia  Hypertension  Type 2 diabetes mellitus  No pertinent past medical history      PAST SURGICAL HISTORY  No significant past surgical history      FAMILY HISTORY:  FHx: hypertension: Father      SOCIAL HISTORY: As per chart Denies smoking/alcohol/drugs      CURRENT MEDICATIONS:           HOME MEDICATIONS:        Vital Signs Last 24 Hrs  T(C): 36.9 (16 Jun 2020 13:30), Max: 36.9 (16 Jun 2020 13:30)  T(F): 98.5 (16 Jun 2020 13:30), Max: 98.5 (16 Jun 2020 13:30)  HR: 90 (16 Jun 2020 15:37) (90 - 115)  BP: 177/71 (16 Jun 2020 15:37) (177/71 - 235/107)  RR: 26 (16 Jun 2020 14:56) (22 - 26)  SpO2: 96% (16 Jun 2020 15:37) (96% - 97%)      PHYSICAL EXAM:  Due to the nature of this patient's COVID-19 isolation status, no bedside physical exam done to limit spread of infection.  Examination highlights were provided by bedside nurse. Objective data were reviewed in detail.     Intake and output:     LABS:                        14.2   8.85  )-----------( 293      ( 16 Jun 2020 15:10 )             43.6     06-16    139  |  101  |  10.0  ----------------------------<  121<H>  3.9   |  25.0  |  0.54    Ca    9.4      16 Jun 2020 15:10  Mg     2.1     06-16    TPro  8.3  /  Alb  4.4  /  TBili  1.0  /  DBili  x   /  AST  18  /  ALT  12  /  AlkPhos  124<H>  06-16    CARDIAC MARKERS ( 16 Jun 2020 15:10 )  x     / <0.01 ng/mL / x     / x     / x        ;p-BNP=Serum Pro-Brain Natriuretic Peptide: 272 pg/mL (06-16 @ 15:10)    PT/INR - ( 16 Jun 2020 15:11 )   PT: 12.8 sec;   INR: 1.13 ratio         PTT - ( 16 Jun 2020 15:11 )  PTT:28.6 sec      INTERPRETATION OF TELEMETRY: NSR HR @ 90S  ECG: ST HR @ 117     RADIOLOGY & ADDITIONAL STUDIES:    X-ray:  < from: Xray Chest 1 View AP/PA. (06.16.20 @ 15:30) >  Heart is enlarged.    There is a slight central congestive like picture new since November 12, 2019.    IMPRESSION: As above.      < end of copied text >     CT scan: pending [facility]                                                               Office:  [address] [address]                                                              Telephone: [phone]. Fax:[phone]                                                                        CARDIOLOGY CONSULTATION NOTE                                                                                             Consult requested by:  Dr. Rod  Reason for Consultation: Chest Pain  History obtained by: Patient and medical record   obtained: No    Chief complaint:    Patient is a 60y old  Female who presents with a chief complaint of       HPI: Due to the nature of this patient's COVID-19 isolation status, no bedside physical exam done to limit spread of infection.  Examination highlights were provided by bedside nurse. Objective data were reviewed in detail. As per chart, Pt is a "60y F w/ hx HTN, HLD, DM, presenting for chest pain.  Pt states that for the past 4 days, she has been having intermittent exertional chest pain described as non-radiating pressure sensation, associated with dyspnea.  Also complains of intermittent headache.  Denies n/v, diaphoresis, palpitations, vision changes, numbness, tingling, weakness.  Denies any symptoms at this time.  Pt was seen at [facility] today, and was sent to the ED for further evaluation.  States that she has been compliant with her medications; did not take her medications yet today.  Of note, pt had symptoms of decreased taste/smell in April 2020, now resolved, and recently tested positive for COVID-19.  Follows with [facility]; had recent echo done". Pt currently in ED, denies current chest pain. ECG-ST HR @ 117 with no ischemic changes, Trop#1-negative, BNP-272, Xray- heart enlarged, slight new congestion. Recently, pt completed stress which showed 5/2020- EF 54%, ischemic ECG changes, severe ischemia in p.LAD territory, mild ischemia in the RCA territory.         REVIEW OF SYMPTOMS:     CONSTITUTIONAL: No fever, weight loss, or fatigue  ENMT:  No difficulty hearing, tinnitus, vertigo; No sinus or throat pain  NECK: No pain or stiffness  CARDIOVASCULAR: See HPI  RESPIRATORY: No Dyspnea on exertion, Shortness of breath, cough, wheezing  : No dysuria, no hematuria   GI: No dark color stool, no melena, no diarrhea, no constipation, no abdominal pain   NEURO: No headache, no dizziness, no slurred speech   MUSCULOSKELETAL: No joint pain or swelling; No muscle, back, or extremity pain  PSYCH: No agitation, no anxiety.    ALL OTHER REVIEW OF SYSTEMS ARE NEGATIVE.      PREVIOUS DIAGNOSTIC TESTING  ECHO FINDINGS: 5/2020- EF 50-55%, grade I diastolic dysfx., moderate LVH, normal RV size and fx.,  PA pressure 37.8 borderline pulm. HTN      STRESS FINDINGS: 5/2020- EF 54%, ischemic ECG changes, severe ischemia in p.LAD territory, mild ischemia in the RCA territory      CATHETERIZATION FINDINGS: eventual         ALLERGIES: Allergies    penicillin (Rash)    Intolerances          PAST MEDICAL HISTORY  Obesity  Hyperlipidemia  Hypertension  Type 2 diabetes mellitus  No pertinent past medical history      PAST SURGICAL HISTORY  No significant past surgical history      FAMILY HISTORY:  FHx: hypertension: Father      SOCIAL HISTORY: As per chart Denies smoking/alcohol/drugs      CURRENT MEDICATIONS:           HOME MEDICATIONS:        Vital Signs Last 24 Hrs  T(C): 36.9 (16 Jun 2020 13:30), Max: 36.9 (16 Jun 2020 13:30)  T(F): 98.5 (16 Jun 2020 13:30), Max: 98.5 (16 Jun 2020 13:30)  HR: 90 (16 Jun 2020 15:37) (90 - 115)  BP: 177/71 (16 Jun 2020 15:37) (177/71 - 235/107)  RR: 26 (16 Jun 2020 14:56) (22 - 26)  SpO2: 96% (16 Jun 2020 15:37) (96% - 97%)      PHYSICAL EXAM:  Due to the nature of this patient's COVID-19 isolation status, no bedside physical exam done to limit spread of infection.  Examination highlights were provided by bedside nurse. Objective data were reviewed in detail.  ER physicians examination noted.    Intake and output:     LABS:                        14.2   8.85  )-----------( 293      ( 16 Jun 2020 15:10 )             43.6     06-16    139  |  101  |  10.0  ----------------------------<  121<H>  3.9   |  25.0  |  0.54    Ca    9.4      16 Jun 2020 15:10  Mg     2.1     06-16    TPro  8.3  /  Alb  4.4  /  TBili  1.0  /  DBili  x   /  AST  18  /  ALT  12  /  AlkPhos  124<H>  06-16    CARDIAC MARKERS ( 16 Jun 2020 15:10 )  x     / <0.01 ng/mL / x     / x     / x        ;p-BNP=Serum Pro-Brain Natriuretic Peptide: 272 pg/mL (06-16 @ 15:10)    PT/INR - ( 16 Jun 2020 15:11 )   PT: 12.8 sec;   INR: 1.13 ratio         PTT - ( 16 Jun 2020 15:11 )  PTT:28.6 sec      INTERPRETATION OF TELEMETRY: NSR HR @ 90S  ECG: ST HR @ 117     RADIOLOGY & ADDITIONAL STUDIES:    X-ray:  < from: Xray Chest 1 View AP/PA. (06.16.20 @ 15:30) >  Heart is enlarged.    There is a slight central congestive like picture new since November 12, 2019.    IMPRESSION: As above.      < end of copied text >     CT scan: pending

## 2020-06-16 NOTE — ED PROVIDER NOTE - PROGRESS NOTE DETAILS
Pt remains in no distress.  Rpt /71, HR 90.  Consult placed to [facility]. Pt evaluated by [facility], requesting admission for likely cath.

## 2020-06-16 NOTE — ED PROVIDER NOTE - NS ED ROS FT
Constitutional: no fever, no chills  Eyes: no vision changes  ENT: no nasal congestion, no sore throat  CV: +chest pain  Resp: no cough, no shortness of breath  GI: no abdominal pain, no vomiting, no diarrhea  : no dysuria  MSK: no joint pain  Skin: no rash  Neuro: +headache, no weakness, no paresthesias

## 2020-06-16 NOTE — H&P ADULT - NSICDXPASTMEDICALHX_GEN_ALL_CORE_FT
PAST MEDICAL HISTORY:  COVID-19     Hyperlipidemia     Hypertension     Obesity     Type 2 diabetes mellitus

## 2020-06-16 NOTE — ED PROVIDER NOTE - CLINICAL SUMMARY MEDICAL DECISION MAKING FREE TEXT BOX
60y F w/ hx HTN, HLD, DM, presenting for 4 days of intermittent exertional chest pain and dyspnea.  Pt hypertensive to 235/107 and tachycardic to 115 on arrival; however denies any symptoms at this time.  Will tx with labetalol and ASA.  Obtain EKG, CXR, labs.  Will consult [facility].

## 2020-06-16 NOTE — ED PROVIDER NOTE - ATTENDING CONTRIBUTION TO CARE
Joellen: I performed a face to face evaluation of this patient and performed a full history and physical examination on the patient.  I agree with the resident's history, physical examination, and plan of the patient unless otherwise noted. My brief assessment is as follows: hx as documented 3-4 days non radiating chest discomfort, worse with exertion associated with sob when exerting self, sent from [facility] for eval. states had covid symptoms late april, tested positive ?last week. tachycardic, hypertense, with nl sats, currently denying any symptoms. obese, tachy, equal pulses b/l, ctab, reg rate/effort breathing with nl sats, bening abd, no swelling b/l LE, nl neuro.e kg with old twi i/avl, no st e/d. labs, labetalol, cxr, cards consult, reassess.

## 2020-06-16 NOTE — ED ADULT NURSE NOTE - OBJECTIVE STATEMENT
60 F, nad, alert and oriented x 3, Nepalese speaking and  Lupe at bedside to translate. Pt denies CP, denies SOB, denies headache, offers no complaints, states "I was sent here by the clinic for high blood pressure". Pt with known COVID +, cardiac and O2 monitoring in place, fall precautions in place, will monitor.

## 2020-06-16 NOTE — H&P ADULT - HISTORY OF PRESENT ILLNESS
Pt. is a 61 y/o  female who has been having intermittent exertional chest pain in epigastric area described as non-radiating pressure sensation, associated with dyspnea. Denies n/v, diaphoresis, palpitations, vision changes, numbness, tingling, weakness. no abd. pain. no n/v/d. no symptoms at the time of admission. Pt was seen at [facility] today, and was sent to the ED for further evaluation.  States that she has been compliant with her medications; did not take her medications yet today.  Of note, pt had symptoms of decreased taste/smell in April 2020, now resolved, and recently tested positive for COVID-19.  pt. had recent echo done. ECG-ST HR @ 117 with no ischemic changes, Trop#1-negative, BNP-272, Xray- heart enlarged, slight new congestion. Recently, pt completed stress which showed 5/2020- EF 54%, ischemic ECG changes, severe ischemia in p.LAD territory, mild ischemia in the RCA territory.

## 2020-06-16 NOTE — ED ADULT TRIAGE NOTE - NSWEIGHTCALCTOOLDRUG_GEN_A_CORE
----- Message from Kimberly Ferreira sent at 7/16/2019 10:27 AM CDT -----  Contact: pt  Pt is returning staff call    Pt call back [phone]    Thanks      used

## 2020-06-16 NOTE — H&P ADULT - NSHPPHYSICALEXAM_GEN_ALL_CORE
General: Well developed  female lying in bed not in distress.   HEENT: AT, NC. PERRL. intact EOM. no throat erythema or exudate.   Neck: supple. no JVD.   Chest: CTA bilaterally, no w /r/r.   Heart: S1,S2. RRR. no heart murmur. no edema.   Abdomen: soft. non-tender. non-distended. + BS.   Ext: no calf tenderness. ROM of all ext. intact. distal pulses intact.   Neuro: AAO x3. no focal weakness. no speech disorder. CNs intact. m/r/s intact.   Skin: no rash noted, warm and dry.   Psych : co-operative, mood ok, no si/hi.

## 2020-06-17 LAB
A1C WITH ESTIMATED AVERAGE GLUCOSE RESULT: 6.4 % — HIGH (ref 4–5.6)
CHOLEST SERPL-MCNC: 136 MG/DL — SIGNIFICANT CHANGE UP (ref 110–199)
ESTIMATED AVERAGE GLUCOSE: 137 MG/DL — HIGH (ref 68–114)
GLUCOSE BLDC GLUCOMTR-MCNC: 115 MG/DL — HIGH (ref 70–99)
GLUCOSE BLDC GLUCOMTR-MCNC: 126 MG/DL — HIGH (ref 70–99)
GLUCOSE BLDC GLUCOMTR-MCNC: 144 MG/DL — HIGH (ref 70–99)
GLUCOSE BLDC GLUCOMTR-MCNC: 145 MG/DL — HIGH (ref 70–99)
HCV AB S/CO SERPL IA: 0.24 S/CO — SIGNIFICANT CHANGE UP (ref 0–0.99)
HCV AB SERPL-IMP: SIGNIFICANT CHANGE UP
HDLC SERPL-MCNC: 40 MG/DL — LOW
LIPID PNL WITH DIRECT LDL SERPL: 76 MG/DL — SIGNIFICANT CHANGE UP
SARS-COV-2 RNA SPEC QL NAA+PROBE: DETECTED
TOTAL CHOLESTEROL/HDL RATIO MEASUREMENT: 3 RATIO — LOW (ref 3.3–7.1)
TRIGL SERPL-MCNC: 98 MG/DL — SIGNIFICANT CHANGE UP (ref 10–200)
TROPONIN T SERPL-MCNC: <0.01 NG/ML — SIGNIFICANT CHANGE UP (ref 0–0.06)
TROPONIN T SERPL-MCNC: <0.01 NG/ML — SIGNIFICANT CHANGE UP (ref 0–0.06)

## 2020-06-17 PROCEDURE — 93306 TTE W/DOPPLER COMPLETE: CPT | Mod: 26

## 2020-06-17 PROCEDURE — 99233 SBSQ HOSP IP/OBS HIGH 50: CPT | Mod: GC

## 2020-06-17 PROCEDURE — 99232 SBSQ HOSP IP/OBS MODERATE 35: CPT

## 2020-06-17 RX ORDER — HYDRALAZINE HCL 50 MG
10 TABLET ORAL EVERY 6 HOURS
Refills: 0 | Status: DISCONTINUED | OUTPATIENT
Start: 2020-06-17 | End: 2020-06-17

## 2020-06-17 RX ORDER — HYDRALAZINE HCL 50 MG
10 TABLET ORAL EVERY 6 HOURS
Refills: 0 | Status: DISCONTINUED | OUTPATIENT
Start: 2020-06-17 | End: 2020-06-19

## 2020-06-17 RX ORDER — LABETALOL HCL 100 MG
10 TABLET ORAL ONCE
Refills: 0 | Status: COMPLETED | OUTPATIENT
Start: 2020-06-17 | End: 2020-06-17

## 2020-06-17 RX ORDER — METOPROLOL TARTRATE 50 MG
25 TABLET ORAL ONCE
Refills: 0 | Status: COMPLETED | OUTPATIENT
Start: 2020-06-17 | End: 2020-06-17

## 2020-06-17 RX ADMIN — Medication 25 MILLIGRAM(S): at 20:27

## 2020-06-17 RX ADMIN — Medication 25 MILLIGRAM(S): at 06:09

## 2020-06-17 RX ADMIN — Medication 10 MILLIGRAM(S): at 08:44

## 2020-06-17 RX ADMIN — Medication 25 MILLIGRAM(S): at 22:24

## 2020-06-17 RX ADMIN — Medication 10 MILLIGRAM(S): at 09:00

## 2020-06-17 RX ADMIN — ATORVASTATIN CALCIUM 40 MILLIGRAM(S): 80 TABLET, FILM COATED ORAL at 22:24

## 2020-06-17 RX ADMIN — Medication 10 MILLIGRAM(S): at 02:08

## 2020-06-17 RX ADMIN — Medication 25 MILLIGRAM(S): at 16:42

## 2020-06-17 RX ADMIN — Medication 10 MILLIGRAM(S): at 23:54

## 2020-06-17 RX ADMIN — Medication 5 MILLIGRAM(S): at 20:27

## 2020-06-17 RX ADMIN — Medication 5 MILLIGRAM(S): at 13:02

## 2020-06-17 RX ADMIN — ENOXAPARIN SODIUM 40 MILLIGRAM(S): 100 INJECTION SUBCUTANEOUS at 08:43

## 2020-06-17 RX ADMIN — Medication 81 MILLIGRAM(S): at 08:43

## 2020-06-17 NOTE — PROGRESS NOTE ADULT - ATTENDING COMMENTS
Pt is seen, examined, chart reviewed, d/w np/pa.  Management as outlined above.  Chest Pain  NST 5/2020- EF 54%, ischemic ECG changes, severe ischemia in p.LAD territory, mild ischemia in the RCA territory  CT Angio Chest-peripheral PE cannot be excluded, coronary artery calcifications  Plan for cath 6/18

## 2020-06-17 NOTE — CHART NOTE - NSCHARTNOTEFT_GEN_A_CORE
Called by RN to ask about STAT medications ordered by primary team this evening. Per RN STAT Toprol XL and Vasotec ordered this evening was not given and would like to know if medications should be administered. Reviewed pt hx of BP medications received today. Pt admitted for atypical chest pain and hypertensive emergency, cardiology onboard. Pt /94, P 66, asymptomatic. Instructed RN to administer antihypertensives at this time, repeat BP 1 hour and notify PA, continue medications as ordered. RN instructed to continue to monitor pt and notify provider of any changes in pt status.

## 2020-06-17 NOTE — PROGRESS NOTE ADULT - ASSESSMENT
Pt. is a 59 y/o  female who has been having intermittent exertional chest pain in epigastric area described as non-radiating pressure sensation, associated with dyspnea. Denies n/v, diaphoresis, palpitations, vision changes, numbness, tingling, weakness. no abd. pain. no n/v/d. no symptoms at the time of admission. Pt was seen at [facility] yesterday and sent to the ED for further evaluation. Pt states she is compliant with medications and always has difficulty controlling her BP. Of note, pt had symptoms of decreased taste/smell in April 2020, now resolved, and recently tested positive for COVID-19.  Pt. had recent echo done on 5/2020- EF 50-55%, grade I diastolic dysfx., moderate LVH, normal RV size and fx.,PA pressure 37.8 borderline pulm. HTN . ECG-ST HR @ 117 with no ischemic changes. Admitted for chest pain R/O ACS and HTVE emergency. Trop negative x 2. BNP wnl. Recently, pt completed stress which showed 5/2020- EF 54%, ischemic ECG changes, severe ischemia in p.LAD territory, mild ischemia in the RCA territory. Cardiology consulted and given pts coronary artery calcifications, pt will likely have cath as outpt. Pt was also COVID positive and repeat pending.     Atypical chest pain risk for cardiac etiology  - Trop x3 negative.   - BNP wnl  - ECG sinus tachy @112 with no ischemic changes  - Recent echo ON 5/2020 EF 50-55%, grade I diastolic dysfx., moderate LVH, normal RV size and fx.,PA pressure 37.8 borderline pulm. HTN   - Recent stress completed stress which showed  ischemic ECG changes, severe ischemia in p.LAD territory, mild ischemia in the RCA territor  -  Asa, b.b,  statin  - Cardiology consulted, Cardiac cath once COVID negative.    - Given pts coronary artery calcifications pt will not likely have cath in house as per cardiac NP.   - prn SLNTG.   - f/u lipids   - ct angio chest completed --> no central, primary or secondary divisional branch pulmonary artery filling defects. Peripheral vessels are not adequately opacified to exclude peripheral emboli. Will discuss with cardio if further concerns for PE. Currently pt is stable.   - doppler of b/l LE negative for dvt.   - Accelerated htn, metoprolol started by cardiology team   - enalapril 5 mg po daily, IV hydralazine PRN      DM type 2 no long term insulin use with hyperglycemia,   - SS  - Hypoglycemia protocol  - diabetic diet  - current A1C @ 6.4   - monitor sugars    DISPO: Pending cardiology for final recs prior to dc. Likely dc in 48 hours if pts BP can be controlled.  called to update on wifes status. Pt. is a 59 y/o  female who has been having intermittent exertional chest pain in epigastric area described as non-radiating pressure sensation, associated with dyspnea. Denies n/v, diaphoresis, palpitations, vision changes, numbness, tingling, weakness. no abd. pain. no n/v/d. no symptoms at the time of admission. Pt was seen at [facility] yesterday and sent to the ED for further evaluation. Pt states she is compliant with medications and always has difficulty controlling her BP. Of note, pt had symptoms of decreased taste/smell in April 2020, now resolved, and recently tested positive for COVID-19.  Pt. had recent echo done on 5/2020- EF 50-55%, grade I diastolic dysfx., moderate LVH, normal RV size and fx.,PA pressure 37.8 borderline pulm. HTN . ECG-ST HR @ 117 with no ischemic changes. Admitted for chest pain R/O ACS and HTVE emergency. Trop negative x 2. BNP wnl. Recently, pt completed stress which showed 5/2020- EF 54%, ischemic ECG changes, severe ischemia in p.LAD territory, mild ischemia in the RCA territory. Cardiology consulted and given pts coronary artery calcifications, pt will likely have cath as outpt. Pt was also COVID positive and repeat pending.     Atypical chest pain risk for cardiac etiology r/o acs  in the setting of hypertensive emergency   - Trop x3 negative.   -  Asa, b.b,  statin  - Cardiology consulted, Cardiac cath once COVID negative.    - Given pts coronary artery calcifications pt will not likely have cath in house as per cardiac NP.   - prn SLNTG.   - f/u lipids   - doppler of b/l LE negative for dvt., cta noted will consider v/q scan   - Accelerated htn, metoprolol started by cardiology team   - enalapril 5 mg po daily, IV hydralazine PRN      DM type 2 no long term insulin use with hyperglycemia, a1c 6.4   - SS  - Hypoglycemia protocol    covid + test - no symptoms at this time. Monitor     DISPO: Pending cardiology for final recs prior to dc. Likely dc in 48 hours if pts BP can be controlled.  called to update on wifes status. Pt. is a 61 y/o  female who has been having intermittent exertional chest pain in epigastric area described as non-radiating pressure sensation, associated with dyspnea. Denies n/v, diaphoresis, palpitations, vision changes, numbness, tingling, weakness. no abd. pain. no n/v/d. no symptoms at the time of admission. Pt was seen at [facility] yesterday and sent to the ED for further evaluation. Pt states she is compliant with medications and always has difficulty controlling her BP. Of note, pt had symptoms of decreased taste/smell in April 2020, now resolved, and recently tested positive for COVID-19.  Pt. had recent echo done on 5/2020- EF 50-55%, grade I diastolic dysfx., moderate LVH, normal RV size and fx.,PA pressure 37.8 borderline pulm. HTN . ECG-ST HR @ 117 with no ischemic changes. Admitted for chest pain R/O ACS and HTVE emergency. Trop negative x 2. BNP wnl. Recently, pt completed stress which showed 5/2020- EF 54%, ischemic ECG changes, severe ischemia in p.LAD territory, mild ischemia in the RCA territory. Cardiology consulted and given pts coronary artery calcifications, pt will likely have cath as outpt. Pt was also COVID positive and repeat pending.     Atypical chest pain risk for cardiac etiology r/o acs  in the setting of hypertensive emergency   - Trop x3 negative.   -  Asa, b.b,  statin  - Cardiology consulted, Cardiac cath in am. NPO after midnight except meds   - prn SLNTG.   - f/u lipids   - doppler of b/l LE negative for dvt., cta noted will consider v/q scan   - Repeat echo ordered   - Accelerated htn, metoprolol started by cardiology team   - enalapril 5 mg po daily, IV hydralazine PRN      DM type 2 no long term insulin use with hyperglycemia, a1c 6.4   - SS  - Hypoglycemia protocol    covid + test - no symptoms at this time. Repeat test pending.  Monitor     DISPO: Pending cardiology for final recs prior to dc. Likely dc in 48 hours if pts BP can be controlled.  called to update on wifes status.

## 2020-06-17 NOTE — PROGRESS NOTE ADULT - SUBJECTIVE AND OBJECTIVE BOX
GAURAV PARKINSON  60y  Female    Complaints:  Subjective:               Vital Signs Last 24 Hrs  T(C): 36.9 (17 Jun 2020 12:13), Max: 37.3 (16 Jun 2020 20:49)  T(F): 98.4 (17 Jun 2020 12:13), Max: 99.2 (16 Jun 2020 20:49)  HR: 77 (17 Jun 2020 14:13) (65 - 89)  BP: 112/85 (17 Jun 2020 14:13) (112/85 - 204/96)  RR: 18 (17 Jun 2020 14:13) (18 - 20)  SpO2: 96% (17 Jun 2020 14:13) (96% - 98%)    Physical exam:  General: Well developed  female lying in bed not in distress.   HEENT: AT, NC.  Neck: supple. no JVD, no bruits present  PULM: CTA bilaterally, no w /r/r.   CARDIO: S1,S2. RRR. no heart murmur. no edema.   Abdomen: soft. non-tender. non-distended. + BS.   Ext: no calf tenderness. ROM of all ext. intact. distal pulses intact.   Neuro: AAO x3. no focal weakness. no speech disorder. CNs intact. m/r/s intact.   Skin: no rash noted, warm and dry.   Psych : co-operative, mood ok    LABS:                        14.2   8.85  )-----------( 293      ( 16 Jun 2020 15:10 )             43.6   06-16    139  |  101  |  10.0  ----------------------------<  121<H>  3.9   |  25.0  |  0.54    Ca    9.4      16 Jun 2020 15:10  Mg     2.1     06-16    TPro  8.3  /  Alb  4.4  /  TBili  1.0  /  DBili  x   /  AST  18  /  ALT  12  /  AlkPhos  124<H>  06-16      MedsMEDICATIONS  (STANDING):  aspirin  chewable 81 milliGRAM(s) Oral daily  atorvastatin 40 milliGRAM(s) Oral at bedtime  dextrose 5%. 1000 milliLiter(s) (50 mL/Hr) IV Continuous <Continuous>  dextrose 50% Injectable 12.5 Gram(s) IV Push once  dextrose 50% Injectable 25 Gram(s) IV Push once  dextrose 50% Injectable 25 Gram(s) IV Push once  enalapril 5 milliGRAM(s) Oral daily  enoxaparin Injectable 40 milliGRAM(s) SubCutaneous daily  insulin lispro (HumaLOG) corrective regimen sliding scale   SubCutaneous three times a day before meals  insulin lispro (HumaLOG) corrective regimen sliding scale   SubCutaneous at bedtime  metoprolol tartrate 25 milliGRAM(s) Oral every 8 hours    MEDICATIONS  (PRN):  dextrose 40% Gel 15 Gram(s) Oral once PRN Blood Glucose LESS THAN 70 milliGRAM(s)/deciliter  glucagon  Injectable 1 milliGRAM(s) IntraMuscular once PRN Glucose LESS THAN 70 milligrams/deciliter  hydrALAZINE Injectable 10 milliGRAM(s) IV Push every 6 hours PRN HTVe urgency  labetalol Injectable 10 milliGRAM(s) IV Push every 6 hours PRN for sbp above 170.  nitroglycerin     SubLingual 0.4 milliGRAM(s) SubLingual every 5 minutes PRN Chest Pain      HEALTH ISSUES - PROBLEM Dx:  Suspected pulmonary embolism    < from: CT Angio Chest w/ IV Cont (06.16.20 @ 20:21) >    IMPRESSION:  Coronary artery calcifications.  There are no central, primary or secondary divisional branch pulmonary artery filling defects. Peripheral vessels are not adequately opacified to exclude peripheral emboli.  No airspace consolidation or effusion. GAURAV PARKINSON  60y Female    Complaints: chest pain  Subjective: Pt seen and examined at bedside, currently no longer complaining of chest pain. Pt states she is on 1 bp medication which she is compliant with but her BP has remained elevated on her office visits and at home. Understands she is here for chest pain and recently tested positive for COVID. Cardiology consulted. Pt currently denies chest pain, shortness of breath, no palpitations, no nausea, vomiting. ROS otherwise negative  TELE: No events overnight. Will keep monitor for full 24 hours.     Vital Signs Last 24 Hrs  T(C): 36.9 (17 Jun 2020 12:13), Max: 37.3 (16 Jun 2020 20:49)  T(F): 98.4 (17 Jun 2020 12:13), Max: 99.2 (16 Jun 2020 20:49)  HR: 77 (17 Jun 2020 14:13) (65 - 89)  BP: 112/85 (17 Jun 2020 14:13) (112/85 - 204/96)  RR: 18 (17 Jun 2020 14:13) (18 - 20)  SpO2: 96% (17 Jun 2020 14:13) (96% - 98%)    Physical exam:  General: Well developed  female lying in bed not in distress.   HEENT: AT, NC.  Neck: supple. no JVD, no bruits present  PULM: CTA bilaterally, no w /r/r.   CARDIO: S1,S2. RRR. no heart murmur. no edema.   Abdomen: soft. non-tender. non-distended. + BS.   Ext: no calf tenderness. ROM of all ext. intact. distal pulses intact.   Neuro: AAO x3. no focal weakness. no speech disorder. CNs intact. m/r/s intact.   Skin: no rash noted, warm and dry.   Psych : co-operative, mood ok    LABS:                        14.2   8.85  )-----------( 293      ( 16 Jun 2020 15:10 )             43.6   06-16    139  |  101  |  10.0  ----------------------------<  121<H>  3.9   |  25.0  |  0.54    Ca    9.4      16 Jun 2020 15:10  Mg     2.1     06-16    TPro  8.3  /  Alb  4.4  /  TBili  1.0  /  DBili  x   /  AST  18  /  ALT  12  /  AlkPhos  124<H>  06-16      MedsMEDICATIONS  (STANDING):  aspirin  chewable 81 milliGRAM(s) Oral daily  atorvastatin 40 milliGRAM(s) Oral at bedtime  dextrose 5%. 1000 milliLiter(s) (50 mL/Hr) IV Continuous <Continuous>  dextrose 50% Injectable 12.5 Gram(s) IV Push once  dextrose 50% Injectable 25 Gram(s) IV Push once  dextrose 50% Injectable 25 Gram(s) IV Push once  enalapril 5 milliGRAM(s) Oral daily  enoxaparin Injectable 40 milliGRAM(s) SubCutaneous daily  insulin lispro (HumaLOG) corrective regimen sliding scale   SubCutaneous three times a day before meals  insulin lispro (HumaLOG) corrective regimen sliding scale   SubCutaneous at bedtime  metoprolol tartrate 25 milliGRAM(s) Oral every 8 hours    MEDICATIONS  (PRN):  dextrose 40% Gel 15 Gram(s) Oral once PRN Blood Glucose LESS THAN 70 milliGRAM(s)/deciliter  glucagon  Injectable 1 milliGRAM(s) IntraMuscular once PRN Glucose LESS THAN 70 milligrams/deciliter  hydrALAZINE Injectable 10 milliGRAM(s) IV Push every 6 hours PRN HTVe urgency  labetalol Injectable 10 milliGRAM(s) IV Push every 6 hours PRN for sbp above 170.  nitroglycerin     SubLingual 0.4 milliGRAM(s) SubLingual every 5 minutes PRN Chest Pain      HEALTH ISSUES - PROBLEM Dx:  Suspected pulmonary embolism    < from: CT Angio Chest w/ IV Cont (06.16.20 @ 20:21) >    IMPRESSION:  Coronary artery calcifications.  There are no central, primary or secondary divisional branch pulmonary artery filling defects. Peripheral vessels are not adequately opacified to exclude peripheral emboli.  No airspace consolidation or effusion. Patient is a 60y old  Female who presents with a chief complaint of chest pain (17 Jun 2020 16:18)    Subjective: Pt seen and examined at bedside, currently no longer complaining of chest pain. Pt states she is on 1 bp medication which she is compliant with but her BP has remained elevated on her office visits and at home. Understands she is here for chest pain and recently tested positive for COVID. Cardiology consulted. Pt currently denies chest pain, shortness of breath, no palpitations, no nausea, vomiting. ROS otherwise negative    TELE: No events overnight. Will keep monitor for full 24 hours.     Vital Signs Last 24 Hrs  T(C): 36.9 (17 Jun 2020 12:13), Max: 37.3 (16 Jun 2020 20:49)  T(F): 98.4 (17 Jun 2020 12:13), Max: 99.2 (16 Jun 2020 20:49)  HR: 77 (17 Jun 2020 14:13) (65 - 89)  BP: 112/85 (17 Jun 2020 14:13) (112/85 - 204/96)  RR: 18 (17 Jun 2020 14:13) (18 - 20)  SpO2: 96% (17 Jun 2020 14:13) (96% - 98%)    Physical exam:  General: Well developed  female lying in bed not in distress.   HEENT: AT, NC.  Neck: supple. no JVD, no bruits present  PULM: CTA bilaterally, no w /r/r.   CARDIO: S1,S2. RRR. no heart murmur. no edema.   Abdomen: soft. non-tender. non-distended. + BS.   Ext: no calf tenderness. ROM of all ext. intact. distal pulses intact.   Neuro: AAO x3. no focal weakness. no speech disorder. CNs intact. m/r/s intact.   Skin: no rash noted, warm and dry.   Psych : co-operative, mood ok    LABS:                        14.2   8.85  )-----------( 293      ( 16 Jun 2020 15:10 )             43.6   06-16    139  |  101  |  10.0  ----------------------------<  121<H>  3.9   |  25.0  |  0.54    Ca    9.4      16 Jun 2020 15:10  Mg     2.1     06-16    TPro  8.3  /  Alb  4.4  /  TBili  1.0  /  DBili  x   /  AST  18  /  ALT  12  /  AlkPhos  124<H>  06-16    - ECG sinus tachy @112 with no ischemic changes  - Recent echo ON 5/2020 EF 50-55%, grade I diastolic dysfx., moderate LVH, normal RV size and fx.,PA pressure 37.8 borderline pulm. HTN   - Recent stress completed stress which showed  ischemic ECG changes, severe ischemia in p.LAD territory, mild ischemia in the RCA territory    MedsMEDICATIONS  (STANDING):  aspirin  chewable 81 milliGRAM(s) Oral daily  atorvastatin 40 milliGRAM(s) Oral at bedtime  dextrose 5%. 1000 milliLiter(s) (50 mL/Hr) IV Continuous <Continuous>  dextrose 50% Injectable 12.5 Gram(s) IV Push once  dextrose 50% Injectable 25 Gram(s) IV Push once  dextrose 50% Injectable 25 Gram(s) IV Push once  enalapril 5 milliGRAM(s) Oral daily  enoxaparin Injectable 40 milliGRAM(s) SubCutaneous daily  insulin lispro (HumaLOG) corrective regimen sliding scale   SubCutaneous three times a day before meals  insulin lispro (HumaLOG) corrective regimen sliding scale   SubCutaneous at bedtime  metoprolol tartrate 25 milliGRAM(s) Oral every 8 hours    MEDICATIONS  (PRN):  dextrose 40% Gel 15 Gram(s) Oral once PRN Blood Glucose LESS THAN 70 milliGRAM(s)/deciliter  glucagon  Injectable 1 milliGRAM(s) IntraMuscular once PRN Glucose LESS THAN 70 milligrams/deciliter  hydrALAZINE Injectable 10 milliGRAM(s) IV Push every 6 hours PRN HTVe urgency  labetalol Injectable 10 milliGRAM(s) IV Push every 6 hours PRN for sbp above 170.  nitroglycerin     SubLingual 0.4 milliGRAM(s) SubLingual every 5 minutes PRN Chest Pain    < from: CT Angio Chest w/ IV Cont (06.16.20 @ 20:21) >    IMPRESSION:  Coronary artery calcifications.  There are no central, primary or secondary divisional branch pulmonary artery filling defects. Peripheral vessels are not adequately opacified to exclude peripheral emboli.  No airspace consolidation or effusion.

## 2020-06-17 NOTE — PROGRESS NOTE ADULT - ASSESSMENT
Due to the nature of this patient's COVID-19 isolation status, no bedside physical exam done to limit spread of infection.  Examination highlights were provided by bedside nurse. Objective data were reviewed in detail. As per chart, Pt is a "60y F w/ hx HTN, HLD, DM, presenting for chest pain.  Pt states that for the past 4 days, she has been having intermittent exertional chest pain described as non-radiating pressure sensation, associated with dyspnea.  Also complains of intermittent headache.  Denies n/v, diaphoresis, palpitations, vision changes, numbness, tingling, weakness.  Denies any symptoms at this time.  Pt was seen at [facility] today, and was sent to the ED for further evaluation.  States that she has been compliant with her medications; did not take her medications yet today.  Of note, pt had symptoms of decreased taste/smell in April 2020, now resolved, and recently tested positive for COVID-19.  Follows with [facility]; had recent echo done". Pt currently in ED, denies current chest pain. ECG-ST HR @ 117 with no ischemic changes, Trop#1-negative, BNP-272, Xray- heart enlarged, slight new congestion. Recently, pt completed stress which showed 5/2020- EF 54%, ischemic ECG changes, severe ischemia in p.LAD territory, mild ischemia in the RCA territory.     6/17: Due to the nature of this patient's COVID-19 isolation status, no bedside physical exam done to limit spread of infection.  Examination highlights were provided by bedside nurse. Objective data were reviewed in detail. Plan for high risk cath tomorrow 6/18, maintain NPO after MN.     Chest Pain-CAD  -Trop#1-negative  -ECG- ST HR @ 117 with no ischemic changes  -Echo-5/2020- EF 50-55%, grade I diastolic dysfx., moderate LVH, normal RV size and fx.,  PA pressure 37.8 borderline pulm. HTN  -Stress- 5/2020- EF 54%, ischemic ECG changes, severe ischemia in p.LAD territory, mild ischemia in the RCA territory  -Cont. to trend trop.  -TSH-1.1  -D-dimer-326  -US lower ext.-negative for DVT   -CT Angio Chest-peripheral PE cannot be excluded, coronary artery calcifications  -Plan for cath 6/18  -Start ASA 81MG (start tomorrow), Lopressor 25mg q 8HR, Lipitor 40mg       COVID19- Positive on 6/10/2020        Elevated D dimer  - Chest CTA- peripheral PE cannot be excluded, coronary artery calcifications  -DW Dr. Rogers-no empirical AC at this time

## 2020-06-17 NOTE — PROGRESS NOTE ADULT - SUBJECTIVE AND OBJECTIVE BOX
[facility]                                                               Office: [address] [address]                                                              Telephone: [phone]. Fax:[phone]                                                                             PROGRESS NOTE  Reason for follow up: Chest Pain  Overnight: No new events.   Update: 6/17: Due to the nature of this patient's COVID-19 isolation status, no bedside physical exam done to limit spread of infection.  Examination highlights were provided by bedside nurse. Objective data were reviewed in detail. Plan for high risk cath tomorrow 6/18, maintain NPO after MN.             	  Vitals:  T(C): 36.5 (06-17-20 @ 17:32), Max: 37.3 (06-16-20 @ 20:49)  HR: 81 (06-17-20 @ 17:32) (65 - 89)  BP: 166/91 (06-17-20 @ 17:32) (112/85 - 204/96)  RR: 16 (06-17-20 @ 17:32) (16 - 20)  SpO2: 97% (06-17-20 @ 17:32) (96% - 98%)    I&O's Summary    Weight (kg): 87.4 (06-17 @ 01:30)      PHYSICAL EXAM:  Due to the nature of this patient's COVID-19 isolation status, no bedside physical exam done to limit spread of infection.  Examination highlights were provided by bedside nurse. Objective data were reviewed in detail. Refer to family medicine physical exam      CURRENT MEDICATIONS:  enalapril 5 milliGRAM(s) Oral once  hydrALAZINE Injectable 10 milliGRAM(s) IV Push every 6 hours PRN  metoprolol succinate ER 25 milliGRAM(s) Oral once  metoprolol tartrate 25 milliGRAM(s) Oral every 8 hours  nitroglycerin     SubLingual 0.4 milliGRAM(s) SubLingual every 5 minutes PRN  atorvastatin  dextrose 50% Injectable  dextrose 50% Injectable  dextrose 50% Injectable  insulin lispro (HumaLOG) corrective regimen sliding scale  insulin lispro (HumaLOG) corrective regimen sliding scale  aspirin  chewable  dextrose 5%.         OTHER: 	  CT: < from: CT Angio Chest w/ IV Cont (06.16.20 @ 20:21) >    FINDINGS:  There are no central, primary or secondary divisional branch pulmonary artery filling defects. Peripheral vessels are not adequately opacified to exclude peripheral emboli.    The mediastinum great vessels are normal.     There are no mediastinal masses or lymphadenopathy.     Cardiac chambers normal size.  No pericardial effusion.  Coronary artery calcifications.    The airway is patent showing normal caliber and contour.    There are no airspace consolidations.    There is no pleural effusion or pneumothorax.    Visualized upper abdominal viscera unremarkable.    The bones are normal.    IMPRESSION:  Coronary artery calcifications.  There are no central, primary or secondary divisional branch pulmonary artery filling defects. Peripheral vessels are not adequately opacified to exclude peripheral emboli.  No airspace consolidation or effusion.    < end of copied text >      US:     LABS:	 	  CARDIAC MARKERS ( 17 Jun 2020 07:56 )  x     / <0.01 ng/mL / x     / x     / x      p-BNP 17 Jun 2020 07:56: x    , CARDIAC MARKERS ( 17 Jun 2020 00:02 )  x     / <0.01 ng/mL / x     / x     / x      p-BNP 17 Jun 2020 00:02: x    , CARDIAC MARKERS ( 16 Jun 2020 15:10 )  x     / <0.01 ng/mL / x     / x     / x      p-BNP 16 Jun 2020 15:10: 272 pg/mL                          14.2   8.85  )-----------( 293      ( 16 Jun 2020 15:10 )             43.6     06-16    139  |  101  |  10.0  ----------------------------<  121<H>  3.9   |  25.0  |  0.54    Ca    9.4      16 Jun 2020 15:10  Mg     2.1     06-16    TPro  8.3  /  Alb  4.4  /  TBili  1.0  /  DBili  x   /  AST  18  /  ALT  12  /  AlkPhos  124<H>  06-16    proBNP: Serum Pro-Brain Natriuretic Peptide: 272 pg/mL (06-16 @ 15:10)    Lipid Profile: Date: 06-17 @ 07:56  Total cholesterol 136; Direct LDL: 76; HDL: 40; Triglycerides:98    HgA1c:   TSH: Thyroid Stimulating Hormone, Serum: 1.14 uIU/mL        TELEMETRY: NSR HR @ 80s

## 2020-06-18 ENCOUNTER — TRANSCRIPTION ENCOUNTER (OUTPATIENT)
Age: 60
End: 2020-06-18

## 2020-06-18 DIAGNOSIS — R07.9 CHEST PAIN, UNSPECIFIED: ICD-10-CM

## 2020-06-18 LAB
ANION GAP SERPL CALC-SCNC: 12 MMOL/L — SIGNIFICANT CHANGE UP (ref 5–17)
BASOPHILS # BLD AUTO: 0.05 K/UL — SIGNIFICANT CHANGE UP (ref 0–0.2)
BASOPHILS NFR BLD AUTO: 0.5 % — SIGNIFICANT CHANGE UP (ref 0–2)
BUN SERPL-MCNC: 14 MG/DL — SIGNIFICANT CHANGE UP (ref 8–20)
CALCIUM SERPL-MCNC: 9.2 MG/DL — SIGNIFICANT CHANGE UP (ref 8.6–10.2)
CHLORIDE SERPL-SCNC: 102 MMOL/L — SIGNIFICANT CHANGE UP (ref 98–107)
CHOLEST SERPL-MCNC: 134 MG/DL — SIGNIFICANT CHANGE UP (ref 110–199)
CO2 SERPL-SCNC: 24 MMOL/L — SIGNIFICANT CHANGE UP (ref 22–29)
CREAT SERPL-MCNC: 0.56 MG/DL — SIGNIFICANT CHANGE UP (ref 0.5–1.3)
EOSINOPHIL # BLD AUTO: 0.27 K/UL — SIGNIFICANT CHANGE UP (ref 0–0.5)
EOSINOPHIL NFR BLD AUTO: 2.9 % — SIGNIFICANT CHANGE UP (ref 0–6)
GLUCOSE BLDC GLUCOMTR-MCNC: 118 MG/DL — HIGH (ref 70–99)
GLUCOSE BLDC GLUCOMTR-MCNC: 125 MG/DL — HIGH (ref 70–99)
GLUCOSE BLDC GLUCOMTR-MCNC: 130 MG/DL — HIGH (ref 70–99)
GLUCOSE BLDC GLUCOMTR-MCNC: 199 MG/DL — HIGH (ref 70–99)
GLUCOSE SERPL-MCNC: 133 MG/DL — HIGH (ref 70–99)
HCT VFR BLD CALC: 41.2 % — SIGNIFICANT CHANGE UP (ref 34.5–45)
HDLC SERPL-MCNC: 41 MG/DL — LOW
HGB BLD-MCNC: 13.6 G/DL — SIGNIFICANT CHANGE UP (ref 11.5–15.5)
IMM GRANULOCYTES NFR BLD AUTO: 0.2 % — SIGNIFICANT CHANGE UP (ref 0–1.5)
LIPID PNL WITH DIRECT LDL SERPL: 70 MG/DL — SIGNIFICANT CHANGE UP
LYMPHOCYTES # BLD AUTO: 2.33 K/UL — SIGNIFICANT CHANGE UP (ref 1–3.3)
LYMPHOCYTES # BLD AUTO: 25 % — SIGNIFICANT CHANGE UP (ref 13–44)
MAGNESIUM SERPL-MCNC: 2.2 MG/DL — SIGNIFICANT CHANGE UP (ref 1.6–2.6)
MCHC RBC-ENTMCNC: 29.2 PG — SIGNIFICANT CHANGE UP (ref 27–34)
MCHC RBC-ENTMCNC: 33 GM/DL — SIGNIFICANT CHANGE UP (ref 32–36)
MCV RBC AUTO: 88.6 FL — SIGNIFICANT CHANGE UP (ref 80–100)
MONOCYTES # BLD AUTO: 0.67 K/UL — SIGNIFICANT CHANGE UP (ref 0–0.9)
MONOCYTES NFR BLD AUTO: 7.2 % — SIGNIFICANT CHANGE UP (ref 2–14)
NEUTROPHILS # BLD AUTO: 5.99 K/UL — SIGNIFICANT CHANGE UP (ref 1.8–7.4)
NEUTROPHILS NFR BLD AUTO: 64.2 % — SIGNIFICANT CHANGE UP (ref 43–77)
PHOSPHATE SERPL-MCNC: 3.1 MG/DL — SIGNIFICANT CHANGE UP (ref 2.4–4.7)
PLATELET # BLD AUTO: 307 K/UL — SIGNIFICANT CHANGE UP (ref 150–400)
POTASSIUM SERPL-MCNC: 4.3 MMOL/L — SIGNIFICANT CHANGE UP (ref 3.5–5.3)
POTASSIUM SERPL-SCNC: 4.3 MMOL/L — SIGNIFICANT CHANGE UP (ref 3.5–5.3)
RBC # BLD: 4.65 M/UL — SIGNIFICANT CHANGE UP (ref 3.8–5.2)
RBC # FLD: 13.5 % — SIGNIFICANT CHANGE UP (ref 10.3–14.5)
SODIUM SERPL-SCNC: 137 MMOL/L — SIGNIFICANT CHANGE UP (ref 135–145)
TOTAL CHOLESTEROL/HDL RATIO MEASUREMENT: 3 RATIO — LOW (ref 3.3–7.1)
TRIGL SERPL-MCNC: 113 MG/DL — SIGNIFICANT CHANGE UP (ref 10–200)
WBC # BLD: 9.33 K/UL — SIGNIFICANT CHANGE UP (ref 3.8–10.5)
WBC # FLD AUTO: 9.33 K/UL — SIGNIFICANT CHANGE UP (ref 3.8–10.5)

## 2020-06-18 PROCEDURE — 92978 ENDOLUMINL IVUS OCT C 1ST: CPT | Mod: 26,LD

## 2020-06-18 PROCEDURE — 99232 SBSQ HOSP IP/OBS MODERATE 35: CPT | Mod: GC

## 2020-06-18 PROCEDURE — 93308 TTE F-UP OR LMTD: CPT | Mod: 26

## 2020-06-18 PROCEDURE — 93010 ELECTROCARDIOGRAM REPORT: CPT

## 2020-06-18 PROCEDURE — 92928 PRQ TCAT PLMT NTRAC ST 1 LES: CPT | Mod: LD

## 2020-06-18 PROCEDURE — 93458 L HRT ARTERY/VENTRICLE ANGIO: CPT | Mod: 26,59

## 2020-06-18 PROCEDURE — 99232 SBSQ HOSP IP/OBS MODERATE 35: CPT

## 2020-06-18 PROCEDURE — 99152 MOD SED SAME PHYS/QHP 5/>YRS: CPT

## 2020-06-18 RX ORDER — CLOPIDOGREL BISULFATE 75 MG/1
75 TABLET, FILM COATED ORAL DAILY
Refills: 0 | Status: DISCONTINUED | OUTPATIENT
Start: 2020-06-19 | End: 2020-06-19

## 2020-06-18 RX ORDER — AMLODIPINE BESYLATE 2.5 MG/1
5 TABLET ORAL DAILY
Refills: 0 | Status: DISCONTINUED | OUTPATIENT
Start: 2020-06-18 | End: 2020-06-19

## 2020-06-18 RX ADMIN — Medication 81 MILLIGRAM(S): at 11:32

## 2020-06-18 RX ADMIN — Medication 10 MILLIGRAM(S): at 06:01

## 2020-06-18 RX ADMIN — Medication 25 MILLIGRAM(S): at 16:51

## 2020-06-18 RX ADMIN — Medication 10 MILLIGRAM(S): at 07:22

## 2020-06-18 RX ADMIN — AMLODIPINE BESYLATE 5 MILLIGRAM(S): 2.5 TABLET ORAL at 16:50

## 2020-06-18 RX ADMIN — Medication 10 MILLIGRAM(S): at 18:23

## 2020-06-18 RX ADMIN — Medication 25 MILLIGRAM(S): at 06:01

## 2020-06-18 RX ADMIN — Medication 25 MILLIGRAM(S): at 21:53

## 2020-06-18 RX ADMIN — ATORVASTATIN CALCIUM 40 MILLIGRAM(S): 80 TABLET, FILM COATED ORAL at 21:53

## 2020-06-18 NOTE — DISCHARGE NOTE PROVIDER - NSDCFUADDAPPT_GEN_ALL_CORE_FT
Restricted use with no heavy lifting of affected arm for 48 hours.  No submerging the arm in water for 48 hours.  You may start showering today.  Call your doctor for any bleeding, swelling, loss of sensation in the hand or fingers, or fingers turning blue.  If heavy bleeding or large lumps form, hold pressure at the spot and come to the Emergency Room.  - Bruising at the groin, sometimes extending down the leg, and/or a small lump under the skin at the groin access site is normal and will resolve within 2 – 3 weeks.   - Occasional skipped beats or palpitations that last for a few beats are common and generally resolve within 1-2 months.   - You may walk and take stairs at a regular pace.   - Do not perform any exercise more strenuous than walking for 1 week.   - Do not strain or lift heavy objects for 1 week.  - You may shower the day after the procedure.  - Do not soak in water (such as tub baths, hot tubs, swimming, etc.) for 1 week.   - You may resume all other activities the day after the procedure.  Call your doctor if:   - you notice bleeding, redness, drainage, swelling, increased tenderness or a hot sensation around the catheter insertion site.   - your temperature is greater than 100 degrees F for more than 24 hours.  - your rapid heart rhythm returns.  - you have any questions or concerns regarding the procedure.  If significant bleeding and/or a large lump (the size of a golf ball or bigger) occurs:  - Lie flat and apply continuous direct pressure just above the puncture site for at least 10 minutes  - If the issue resolves, notify your physician immediately.    - If the bleeding cannot be controlled, please seek immediate medical attention.  If you experience increased difficulty breathing or chest pain, or if you faint or have dizzy spells, please seek immediate medical attention. Please see regular doctor in the community in 2-3 days after discharge   You should also see your cardiologist in 1-2 weeks after discharge       As tolerated. Restricted use with no heavy lifting of affected arm for 48 hours.  No submerging the arm in water for 48 hours.  You may start showering today.  Call your doctor for any bleeding, swelling, loss of sensation in the hand or fingers, or fingers turning blue.  If heavy bleeding or large lumps form, hold pressure at the spot and come to the Emergency Room.  - Bruising at the groin, sometimes extending down the leg, and/or a small lump under the skin at the groin access site is normal and will resolve within 2 – 3 weeks.   - Occasional skipped beats or palpitations that last for a few beats are common and generally resolve within 1-2 months.   - You may walk and take stairs at a regular pace.   - Do not perform any exercise more strenuous than walking for 1 week.   - Do not strain or lift heavy objects for 1 week.  - You may shower the day after the procedure.  - Do not soak in water (such as tub baths, hot tubs, swimming, etc.) for 1 week.   - You may resume all other activities the day after the procedure.  Call your doctor if:   - you notice bleeding, redness, drainage, swelling, increased tenderness or a hot sensation around the catheter insertion site.   - your temperature is greater than 100 degrees F for more than 24 hours.  - your rapid heart rhythm returns.  - you have any questions or concerns regarding the procedure.  If significant bleeding and/or a large lump (the size of a golf ball or bigger) occurs:  - Lie flat and apply continuous direct pressure just above the puncture site for at least 10 minutes  - If the issue resolves, notify your physician immediately.    - If the bleeding cannot be controlled, please seek immediate medical attention.  If you experience increased difficulty breathing or chest pain, or if you faint or have dizzy spells, please seek immediate medical attention.

## 2020-06-18 NOTE — DISCHARGE NOTE PROVIDER - NSDCCPCAREPLAN_GEN_ALL_CORE_FT
PRINCIPAL DISCHARGE DIAGNOSIS  Diagnosis: Chest pain  Assessment and Plan of Treatment: Post LAD JONATHAN stent   ASA and plavix daily   DO NOT STOP UNLESS CARDIOLOGIST SAYS TO PRINCIPAL DISCHARGE DIAGNOSIS  Diagnosis: Chest pain  Assessment and Plan of Treatment: You had chest pain, you were seen by our cardiologist who recommended to have a heart cath. During this procedure you were found to have multiple blockages in the arteries of your heart, and a stent was placed to open one of those artereries and improve blood flow of the heart. You are going to be going home on 2 medications to prevent clots around the stend, aspirin and plavix. This are to be taken everyday unless the cardiologist says to stop them, at least for 1 year.   Post LAD JONATHAN stent   ASA and plavix daily   DO NOT STOP UNLESS CARDIOLOGIST SAYS TO      SECONDARY DISCHARGE DIAGNOSES  Diagnosis: Diabetes mellitus, type 2  Assessment and Plan of Treatment: Follow a low carb low sugar diet. Continue taking your metformin everyday, two times a day. Be sure to see an eye doctor and foot doctor on an annual basis.      Diagnosis: HLD (hyperlipidemia)  Assessment and Plan of Treatment: Follow a low fat diet. Continue taking your cholesterol medications as prescribed. Follow up with your Primary Care Doctor to continue having your cholesterol levels checked on a continual basis.    Diagnosis: HTN (hypertension)  Assessment and Plan of Treatment: Be sure to follow a low salt diet. Continue taking your blood pressure medications. You will be going home on 2 emdications, amlodipine and enalapril. Be sure to take them every day as prescribed and do not miss any doses, the medications do not work if they are not taken consistently. Follow up with your Primary Care Doctor and have your Blood Pressure checked. PRINCIPAL DISCHARGE DIAGNOSIS  Diagnosis: Chest pain  Assessment and Plan of Treatment: You had chest pain, you were seen by our cardiologist who recommended to have a heart cath. During this procedure you were found to have multiple blockages in the arteries of your heart, and a stent was placed to open one of those artereries and improve blood flow of the heart. You are going to be going home on 2 medications to prevent clots around the stend, aspirin and plavix. This are to be taken everyday unless the cardiologist says to stop them, at least for 1 year.   Please follow up with the cardiologist in 1-2 weeks      SECONDARY DISCHARGE DIAGNOSES  Diagnosis: Leucocytosis  Assessment and Plan of Treatment: you were covid positive in the community but have no symptoms of fevers, chills, cough or shortness of breath   You have no further chest pain  You do not have any fevers while here nor any complaints on review of symptoms.    Diagnosis: Diabetes mellitus, type 2  Assessment and Plan of Treatment: a1c 6.4  Follow a low carb low sugar diet. Continue taking your metformin everyday, two times a day. Be sure to see an eye doctor and foot doctor on an annual basis.  Hydrate routinely with water    Diagnosis: HLD (hyperlipidemia)  Assessment and Plan of Treatment: continue with your statin at a dose of 40mg    Diagnosis: HTN (hypertension)  Assessment and Plan of Treatment: Be sure to follow a low salt diet. Continue taking your blood pressure medications. You will be going home on 2 mdications, amlodipine and enalapril. along with metoprolol   Make sure you check your blood pressure routinely and keep a record of it for your doctor

## 2020-06-18 NOTE — DISCHARGE NOTE PROVIDER - CARE PROVIDER_API CALL
Hood Ochoa  CARDIOVASCULAR DISEASE  [address] [place] [id number]  Phone: [phone]  Fax: [phone]  Follow Up Time: 1 week

## 2020-06-18 NOTE — DISCHARGE NOTE PROVIDER - NSDCMRMEDTOKEN_GEN_ALL_CORE_FT
amLODIPine 10 mg oral tablet: 1 tab(s) orally once a day amLODIPine 10 mg oral tablet: 1 tab(s) orally once a day   aspirin 81 mg oral tablet: 1 tab(s) orally once a day  atorvastatin 20 mg oral tablet: 1 tab(s) orally once a day  lisinopril 40 mg oral tablet: 1 tab(s) orally once a day  metFORMIN 1000 mg oral tablet: 1 tab(s) orally 2 times a day  Metoprolol Succinate ER 50 mg oral tablet, extended release: 1 tab(s) orally once a day amLODIPine 10 mg oral tablet: 1 tab(s) orally once a day   aspirin 81 mg oral tablet, chewable: 1 tab(s) orally once a day  atorvastatin 40 mg oral tablet: 1 tab(s) orally once a day (at bedtime)  clopidogrel 75 mg oral tablet: 1 tab(s) orally once a day  enalapril 20 mg oral tablet: 1 tab(s) orally once a day   metFORMIN 1000 mg oral tablet: 1 tab(s) orally 2 times a day  metoprolol succinate 100 mg oral capsule, extended release: 1 cap(s) orally once a day

## 2020-06-18 NOTE — PROGRESS NOTE ADULT - ASSESSMENT
Due to the nature of this patient's COVID-19 isolation status, no bedside physical exam done to limit spread of infection.  Examination highlights were provided by bedside nurse. Objective data were reviewed in detail. As per chart, Pt is a "60y F w/ hx HTN, HLD, DM, presenting for chest pain.  Pt states that for the past 4 days, she has been having intermittent exertional chest pain described as non-radiating pressure sensation, associated with dyspnea.  Also complains of intermittent headache.  Denies n/v, diaphoresis, palpitations, vision changes, numbness, tingling, weakness.  Denies any symptoms at this time.  Pt was seen at [facility] today, and was sent to the ED for further evaluation.  States that she has been compliant with her medications; did not take her medications yet today.  Of note, pt had symptoms of decreased taste/smell in April 2020, now resolved, and recently tested positive for COVID-19.  Follows with [facility]; had recent echo done". Pt currently in ED, denies current chest pain. ECG-ST HR @ 117 with no ischemic changes, Trop#1-negative, BNP-272, Xray- heart enlarged, slight new congestion. Recently, pt completed stress which showed 5/2020- EF 54%, ischemic ECG changes, severe ischemia in p.LAD territory, mild ischemia in the RCA territory.     6/18: Due to the nature of this patient's COVID-19 isolation status, no bedside physical exam done to limit spread of infection.  Examination highlights were provided by bedside nurse. Objective data were reviewed in detail. CM-NSR HR @ 70-80s, Echo- EF 60-65%, grade I diastolic dysfx. Plan for cath today 6/18.       Chest Pain-CAD  -Tropx3- negative  -ECG- ST HR @ 117 with no ischemic changes  -Echo 6/18/2020-EF 60-65%, grade I diastolic dysfx.   -Stress- 5/2020- EF 54%, ischemic ECG changes, severe ischemia in p.LAD territory, mild ischemia in the RCA territory  -CT Angio Chest-peripheral PE cannot be excluded, coronary artery calcifications  -Plan for cath today 6/18  -Start ASA 81MG (start tomorrow), Lopressor 25mg q 8HR, Lipitor 40mg       COVID19- Positive on 6/10/2020        Elevated D dimer  - Chest CTA- peripheral PE cannot be excluded, coronary artery calcifications  -DW Dr. Rogers-no empirical AC at this time

## 2020-06-18 NOTE — PROGRESS NOTE ADULT - ASSESSMENT
Pt. is a 59 y/o  female who has been having intermittent exertional chest pain in epigastric area described as non-radiating pressure sensation, associated with dyspnea. Denies n/v, diaphoresis, palpitations, vision changes, numbness, tingling, weakness. no abd. pain. no n/v/d. no symptoms at the time of admission. Pt was seen at [facility] yesterday and sent to the ED for further evaluation. Pt states she is compliant with medications and always has difficulty controlling her BP. Of note, pt had symptoms of decreased taste/smell in April 2020, now resolved, and recently tested positive for COVID-19.  Pt. had recent echo done on 5/2020- EF 50-55%, grade I diastolic dysfx., moderate LVH, normal RV size and fx.,PA pressure 37.8 borderline pulm. HTN . ECG-ST HR @ 117 with no ischemic changes. Admitted for chest pain R/O ACS and HTVE emergency. Trop negative x3. BNP wnl. Recently, pt completed stress which showed 5/2020- EF 54%, ischemic ECG changes, severe ischemia in p.LAD territory, mild ischemia in the RCA territory. Cardiology consulted and given pts coronary artery calcifications. LHC done during admission on 6/18/2020. Pt was also COVID positive and repeat pending.     Atypical chest pain risk for cardiac etiology r/o acs, in the setting of hypertensive emergency with ischemic EKG changes   - improved BP not yet at goal  - Trop x3 negative.   - Cardiac cath today  - prn SLNTG.   - doppler of b/l LE negative for dvt., cta noted will consider v/q scan   - Repeat echo ordered   - Accelerated htn, c/w metoprolol started by cardiology team   - c/w 10 mg po daily, IV hydralazine PRN   - lipid panel noted     DM type 2 no long term insulin use with hyperglycemia, a1c 6.4   - Fingersticks stable, c/w ISS  - Hypoglycemia protocol    covid + test - no symptoms at this time. Repeat test pending.  Monitor     DISPO: Pending results from LHC, and PT eval. Likely dc in 24-48 hours if pts BP can be controlled, and no interventions needed on cath. Pt. is a 59 y/o  female who has been having intermittent exertional chest pain in epigastric area described as non-radiating pressure sensation, associated with dyspnea. Pt was seen at [facility] and sent to ER. Pt states she is compliant with medications and always has difficulty controlling her BP. Of note, pt had symptoms of decreased taste/smell in April 2020, now resolved, and recently tested positive for COVID-19.  Pt. had recent echo done on 5/2020- EF 50-55%, grade I diastolic dysfx., moderate LVH, normal RV size and fx.,PA pressure 37.8 borderline pulm. HTN . ECG-ST HR @ 117 with no ischemic changes. Admitted for chest pain R/O ACS and HTVE emergency. Trop negative x3. BNP wnl. Recently, pt completed stress which showed 5/2020- EF 54%, ischemic ECG changes, severe ischemia in p.LAD territory, mild ischemia in the RCA territory. Cardiology consulted and given pts coronary artery calcifications. LHC done during admission on 6/18/2020. Pt was also COVID positive and repeat pending.     Atypical chest pain risk for cardiac etiology r/o acs, in the setting of hypertensive emergency with ischemic EKG changes   with h/o hypertension, presenting with hypertensive emergency  - improved BP not yet at goal  - Cardiac cath today  -patient had a cta chest done stating that peripheral clots cannot be ruled out, she is no longer sob and does not have significant right sided pressures to be of concern for peripheral PEs. However, due to her flat affect, inability to give me a dependent detailed history, will get a v/q scan to properly assess for peripheral PEs   - prn SLNTG.    -c/w enalapril, metoprolol and added low dose norvasc, prn hydralazine  - lipid panel noted  -can d/c tele      DM type 2 no long term insulin use with hyperglycemia, a1c 6.4   - Fingersticks stable, c/w ISS  - Hypoglycemia protocol    covid + test - no symptoms at this time. Repeat test pending.  Monitor     DISPO: Pending results from LHC, and PT eval. Likely dc in 24-48 hours if pts BP can be controlled, and no interventions needed on cath.     Spouse to be updated by Resident MD Pt. is a 59 y/o  female who has been having intermittent exertional chest pain in epigastric area described as non-radiating pressure sensation, associated with dyspnea. Pt was seen at [facility] and sent to ER. Pt states she is compliant with medications and always has difficulty controlling her BP. Of note, pt had symptoms of decreased taste/smell in April 2020, now resolved, and recently tested positive for COVID-19.  Pt. had recent echo done on 5/2020- EF 50-55%, grade I diastolic dysfx., moderate LVH, normal RV size and fx.,PA pressure 37.8 borderline pulm. HTN . ECG-ST HR @ 117 with no ischemic changes. Admitted for chest pain R/O ACS and HTVE emergency. Trop negative x3. BNP wnl. Recently, pt completed stress which showed 5/2020- EF 54%, ischemic ECG changes, severe ischemia in p.LAD territory, mild ischemia in the RCA territory. Cardiology consulted and given pts coronary artery calcifications. LHC done during admission on 6/18/2020. Pt was also COVID positive and repeat pending.     Atypical chest pain risk for cardiac etiology r/o acs, in the setting of hypertensive emergency with ischemic EKG changes   with h/o hypertension, presenting with hypertensive emergency  - improved BP not yet at goal  - Cardiac cath today  -patient had a cta chest done stating that peripheral clots cannot be ruled out, she is no longer sob and does not have significant right sided pressures to be of concern for peripheral PEs. However, due to her flat affect, inability to give me a dependent detailed history, will get a v/q scan to properly assess for peripheral PEs   - prn SLNTG.    -c/w enalapril, metoprolol and added low dose norvasc, prn hydralazine  - lipid panel noted  -can d/c tele      DM type 2 no long term insulin use with hyperglycemia, a1c 6.4   - Fingersticks stable, c/w ISS  - Hypoglycemia protocol    covid + test - no symptoms at this time. Repeat test pending.  Monitor     DISPO: Pending results from LHC, and PT eval. Likely dc in 24-48 hours if pts BP can be controlled, and no interventions needed on cath.     Spouse Raul Dqauan, updated with current status and plan of care, all questions answerd. He would like children to visit spouse when able

## 2020-06-18 NOTE — PROGRESS NOTE ADULT - ASSESSMENT
PRE-PROCEDURE ASSESSMENT  [facility] -Patient seen and examined  -Labs and EKG reviewed   -Pre-procedure teaching completed with patient and family  - verbal Informed consent obtained for safety reasons  during CoVID 19 pandemic   -Questions answered  - Pt received Asprin prior to cardiac cath   Risks & benefits of procedure and sedation and risks and benefits for the alternative therapy have been explained to the patient in layman’s terms including but not limited to: allergic reaction, bleeding, infection, arrhythmia, respiratory compromise, renal and vascular compromise, limb damage, MI, CVA, emergent CABG/Vascular Surgery and death. Informed consent obtained and in chart. "60y F w/ hx HTN, HLD, DM, presenting for chest pain.  Pt states that for the past 4 days, she has been having intermittent exertional chest pain described as non-radiating pressure sensation, associated with dyspnea  echo and stress testing abnormal . Troponin negative   Currently reports no chest pain           PRE-PROCEDURE ASSESSMENT  [facility] -Patient seen and examined   -Labs and EKG reviewed   -Pre-procedure teaching completed with patient and family  - Informed consent with  services   -Questions answered  - Pt received Asprin prior to cardiac cath   Risks & benefits of procedure and sedation and risks and benefits for the alternative therapy have been explained to the patient in layman’s terms including but not limited to: allergic reaction, bleeding, infection, arrhythmia, respiratory compromise, renal and vascular compromise, limb damage, MI, CVA, emergent CABG/Vascular Surgery and death. Informed consent obtained and in chart.

## 2020-06-18 NOTE — PROGRESS NOTE ADULT - SUBJECTIVE AND OBJECTIVE BOX
Pt presented for cardiac cath  s/p [facility] with intervention done via RFA and RRA tolerated well  No post chest pain       Vital Signs Last 24 Hrs  T(C): 37.1 (18 Jun 2020 12:53), Max: 37.7 (18 Jun 2020 08:03)  T(F): 98.7 (18 Jun 2020 12:53), Max: 99.9 (18 Jun 2020 08:03)  HR: 86 (18 Jun 2020 12:53) (63 - 86)  BP: 169/94 (18 Jun 2020 12:53) (153/78 - 192/90)    RR: 18 (18 Jun 2020 12:53) (16 - 18)  SpO2: 97% (18 Jun 2020 12:53) (96% - 97%)    Medications:  amLODIPine   Tablet 5 milliGRAM(s) Oral daily  aspirin  chewable 81 milliGRAM(s) Oral daily  atorvastatin 40 milliGRAM(s) Oral at bedtime  enalapril 10 milliGRAM(s) Oral daily  glucagon  Injectable 1 milliGRAM(s) IntraMuscular once PRN  hydrALAZINE Injectable 10 milliGRAM(s) IV Push every 6 hours PRN  insulin lispro (HumaLOG) corrective regimen sliding scale   SubCutaneous three times a day before meals  insulin lispro (HumaLOG) corrective regimen sliding scale   SubCutaneous at bedtime  metoprolol tartrate 25 milliGRAM(s) Oral every 8 hours  nitroglycerin     SubLingual 0.4 milliGRAM(s) SubLingual every 5 minutes PRN    Exam   General: A/ox 3, No acute Distress  Cuban speaking   Neck: Supple, NO JVD  Cardiac: S1 S2, No M/R/G  Pulmonary: CTAB, Breathing unlabored, No Rhonchi/Rales/Wheezing  Abdomen: Soft, Non -tender, +BS   Extremities: No Rashes, No edema  Radial band in place  site good capillary refill + pp no evidence of bleeding fingers warm and mobile    Right Femoral cath site no evidence of bleeding + pp hemostasis maintained with closure device   Neuro: A/o x 3, No focal deficits  Psch: normal mood , normal affect    LABS:                          13.6   9.33  )-----------( 307      ( 18 Jun 2020 07:36 )             41.2     06-18    137  |  102  |  14.0  ----------------------------<  133<H>  4.3   |  24.0  |  0.56    Ca    9.2      18 Jun 2020 07:36  Phos  3.1     06-18  Mg     2.2     06-18 Pt presented for cardiac cath  s/p [facility] with intervention done via RFA and RRA tolerated well  No post chest pain       Vital Signs Last 24 Hrs  T(C): 37.1 (18 Jun 2020 12:53), Max: 37.7 (18 Jun 2020 08:03)  T(F): 98.7 (18 Jun 2020 12:53), Max: 99.9 (18 Jun 2020 08:03)  HR: 86 (18 Jun 2020 12:53) (63 - 86)  BP: 169/94 (18 Jun 2020 12:53) (153/78 - 192/90)  RR: 18 (18 Jun 2020 12:53) (16 - 18)  SpO2: 97% (18 Jun 2020 12:53) (96% - 97%)  Post EKG NSR rate 85 T wave inverted in I and AVL no acute ST or T wave changes   Medications:  amLODIPine   Tablet 5 milliGRAM(s) Oral daily  aspirin  chewable 81 milliGRAM(s) Oral daily  atorvastatin 40 milliGRAM(s) Oral at bedtime  enalapril 10 milliGRAM(s) Oral daily  glucagon  Injectable 1 milliGRAM(s) IntraMuscular once PRN  hydrALAZINE Injectable 10 milliGRAM(s) IV Push every 6 hours PRN  insulin lispro (HumaLOG) corrective regimen sliding scale   SubCutaneous three times a day before meals  insulin lispro (HumaLOG) corrective regimen sliding scale   SubCutaneous at bedtime  metoprolol tartrate 25 milliGRAM(s) Oral every 8 hours  nitroglycerin     SubLingual 0.4 milliGRAM(s) SubLingual every 5 minutes PRN    Exam   General: A/ox 3, No acute Distress  Estonian speaking   Neck: Supple, NO JVD  Cardiac: S1 S2, No M/R/G  Pulmonary: CTAB, Breathing unlabored, No Rhonchi/Rales/Wheezing  Abdomen: Soft, Non -tender, +BS   Extremities: No Rashes, No edema  Radial band in place  site good capillary refill + pp no evidence of bleeding fingers warm and mobile    Right Femoral cath site no evidence of bleeding + pp hemostasis maintained with closure device   Neuro: A/o x 3, No focal deficits  Psch: normal mood , normal affect    LABS:                          13.6   9.33  )-----------( 307      ( 18 Jun 2020 07:36 )             41.2     06-18    137  |  102  |  14.0  ----------------------------<  133<H>  4.3   |  24.0  |  0.56    Ca    9.2      18 Jun 2020 07:36  Phos  3.1     06-18  Mg     2.2     06-18

## 2020-06-18 NOTE — PROGRESS NOTE ADULT - SUBJECTIVE AND OBJECTIVE BOX
Patient is a 60y old  Female who presents with a chief complaint of chest pain (17 Jun 2020 16:18)    Subjective: Pt seen and examined at bedside, currently no longer complaining of chest pain. No acute events overnight. Pt currently denies chest pain, shortness of breath, no palpitations, no nausea, vomiting. ROS otherwise negative.     TELE: No events overnight.    Vital Signs Last 24 Hrs  T(C): 37.7 (18 Jun 2020 08:03), Max: 37.7 (18 Jun 2020 08:03)  T(F): 99.9 (18 Jun 2020 08:03), Max: 99.9 (18 Jun 2020 08:03)  HR: 79 (18 Jun 2020 08:03) (63 - 86)  BP: 168/78 (18 Jun 2020 07:20) (112/85 - 192/90)  BP(mean): --  RR: 18 (18 Jun 2020 08:03) (16 - 18)  SpO2: 96% (18 Jun 2020 08:03) (96% - 98%)    CAPILLARY BLOOD GLUCOSE  POCT Blood Glucose.: 130 mg/dL (18 Jun 2020 08:36)  POCT Blood Glucose.: 115 mg/dL (17 Jun 2020 21:57)  POCT Blood Glucose.: 144 mg/dL (17 Jun 2020 16:37)  POCT Blood Glucose.: 145 mg/dL (17 Jun 2020 11:51)      Physical exam:  General: Well developed  female lying in bed not in distress.   HEENT: AT, NC.  Neck: supple. no JVD, no bruits present  PULM: CTA bilaterally, no w /r/r.   CARDIO: S1,S2. RRR. no heart murmur. no edema.   Abdomen: soft. non-tender. non-distended. + BS.   Ext: no calf tenderness. ROM of all ext. intact. distal pulses intact.   Neuro: AAO x3. no focal weakness. CNS intact.   Skin: no rash noted, warm and dry.   Psych : co-operative, flat, depressed affect    LABS:                                       13.6   9.33  )-----------( 307      ( 18 Jun 2020 07:36 )             41.2   06-18    137  |  102  |  14.0  ----------------------------<  133<H>  4.3   |  24.0  |  0.56    Ca    9.2      18 Jun 2020 07:36  Phos  3.1     06-18  Mg     2.2     06-18    TPro  8.3  /  Alb  4.4  /  TBili  1.0  /  DBili  x   /  AST  18  /  ALT  12  /  AlkPhos  124<H>  06-16    < from: CT Angio Chest w/ IV Cont (06.16.20 @ 20:21) >    IMPRESSION:  Coronary artery calcifications.  There are no central, primary or secondary divisional branch pulmonary artery filling defects. Peripheral vessels are not adequately opacified to exclude peripheral emboli.  No airspace consolidation or effusion.    MEDICATIONS  (STANDING):  aspirin  chewable 81 milliGRAM(s) Oral daily  atorvastatin 40 milliGRAM(s) Oral at bedtime  dextrose 5%. 1000 milliLiter(s) (50 mL/Hr) IV Continuous <Continuous>  dextrose 50% Injectable 12.5 Gram(s) IV Push once  dextrose 50% Injectable 25 Gram(s) IV Push once  dextrose 50% Injectable 25 Gram(s) IV Push once  enalapril 10 milliGRAM(s) Oral daily  insulin lispro (HumaLOG) corrective regimen sliding scale   SubCutaneous three times a day before meals  insulin lispro (HumaLOG) corrective regimen sliding scale   SubCutaneous at bedtime  metoprolol tartrate 25 milliGRAM(s) Oral every 8 hours    MEDICATIONS  (PRN):  dextrose 40% Gel 15 Gram(s) Oral once PRN Blood Glucose LESS THAN 70 milliGRAM(s)/deciliter  glucagon  Injectable 1 milliGRAM(s) IntraMuscular once PRN Glucose LESS THAN 70 milligrams/deciliter  hydrALAZINE Injectable 10 milliGRAM(s) IV Push every 6 hours PRN hypertensive urgency  nitroglycerin     SubLingual 0.4 milliGRAM(s) SubLingual every 5 minutes PRN Chest Pain Patient is a 60y old  Female who presents with a chief complaint of chest pain (17 Jun 2020 16:18)    Subjective: Pt seen and examined at bedside, currently no longer complaining of chest pain. No acute events overnight. Pt currently denies chest pain, shortness of breath, no palpitations, no nausea, vomiting. ROS otherwise negative.     TELE: No events overnight.    Vital Signs Last 24 Hrs  T(C): 37.7 (18 Jun 2020 08:03), Max: 37.7 (18 Jun 2020 08:03)  T(F): 99.9 (18 Jun 2020 08:03), Max: 99.9 (18 Jun 2020 08:03)  HR: 79 (18 Jun 2020 08:03) (63 - 86)  BP: 168/78 (18 Jun 2020 07:20) (112/85 - 192/90)  RR: 18 (18 Jun 2020 08:03) (16 - 18)  SpO2: 96% (18 Jun 2020 08:03) (96% - 98%)    CAPILLARY BLOOD GLUCOSE  POCT Blood Glucose.: 130 mg/dL (18 Jun 2020 08:36)  POCT Blood Glucose.: 115 mg/dL (17 Jun 2020 21:57)  POCT Blood Glucose.: 144 mg/dL (17 Jun 2020 16:37)  POCT Blood Glucose.: 145 mg/dL (17 Jun 2020 11:51)      Physical exam:  General: Well developed  female lying in bed not in distress.   HEENT: AT, NC.  Neck: supple. no JVD, no bruits present  PULM: CTA bilaterally, no w /r/r.   CARDIO: S1,S2. RRR. no heart murmur. no edema.   Abdomen: soft. non-tender. non-distended. + BS.   Ext: no calf tenderness. ROM of all ext. intact. distal pulses intact.   Neuro: AAO x3. no focal weakness. CNS intact.   Skin: no rash noted, warm and dry.   Psych : co-operative, flat, depressed affect    LABS:                                       13.6   9.33  )-----------( 307      ( 18 Jun 2020 07:36 )             41.2   06-18    137  |  102  |  14.0  ----------------------------<  133<H>  4.3   |  24.0  |  0.56    Ca    9.2      18 Jun 2020 07:36  Phos  3.1     06-18  Mg     2.2     06-18    TPro  8.3  /  Alb  4.4  /  TBili  1.0  /  DBili  x   /  AST  18  /  ALT  12  /  AlkPhos  124<H>  06-16    < from: CT Angio Chest w/ IV Cont (06.16.20 @ 20:21) >    IMPRESSION:  Coronary artery calcifications.  There are no central, primary or secondary divisional branch pulmonary artery filling defects. Peripheral vessels are not adequately opacified to exclude peripheral emboli.  No airspace consolidation or effusion.    Echo:   < from: TTE Echo Complete w/o Contrast w/ Doppler (06.17.20 @ 17:58) >   1. Normal left atrial size.   2. There is moderate concentric left ventricular hypertrophy.   3. Segmental wall motion abnormalities in RCA territory.   4. Left ventricular ejection fraction, by visual estimation, is 60 to 65%. Grade I diastolic dysfunction.   5. Normal right atrial size.   6. Normal right ventricular size and function.   7. No significant valvular abnormality.   8. There is no evidence of pericardial effusion.    < end of copied text >      MEDICATIONS  (STANDING):  aspirin  chewable 81 milliGRAM(s) Oral daily  atorvastatin 40 milliGRAM(s) Oral at bedtime  dextrose 5%. 1000 milliLiter(s) (50 mL/Hr) IV Continuous <Continuous>  dextrose 50% Injectable 12.5 Gram(s) IV Push once  dextrose 50% Injectable 25 Gram(s) IV Push once  dextrose 50% Injectable 25 Gram(s) IV Push once  enalapril 10 milliGRAM(s) Oral daily  insulin lispro (HumaLOG) corrective regimen sliding scale   SubCutaneous three times a day before meals  insulin lispro (HumaLOG) corrective regimen sliding scale   SubCutaneous at bedtime  metoprolol tartrate 25 milliGRAM(s) Oral every 8 hours    MEDICATIONS  (PRN):  dextrose 40% Gel 15 Gram(s) Oral once PRN Blood Glucose LESS THAN 70 milliGRAM(s)/deciliter  glucagon  Injectable 1 milliGRAM(s) IntraMuscular once PRN Glucose LESS THAN 70 milligrams/deciliter  hydrALAZINE Injectable 10 milliGRAM(s) IV Push every 6 hours PRN hypertensive urgency  nitroglycerin     SubLingual 0.4 milliGRAM(s) SubLingual every 5 minutes PRN Chest Pain

## 2020-06-18 NOTE — PROGRESS NOTE ADULT - SUBJECTIVE AND OBJECTIVE BOX
Interventional Cardiology NP Precath Note    Pt presents for cardiac cath due to chest pain       COVId positive     Mallampati   ASA   BRA   GFR     PAST MEDICAL & SURGICAL HISTORY:  COVID-19  Obesity  Hyperlipidemia  Hypertension  Type 2 diabetes mellitus  No significant past surgical history    MEDS:   aspirin  chewable 81 milliGRAM(s) Oral daily  atorvastatin 40 milliGRAM(s) Oral at bedtime  enalapril 10 milliGRAM(s) Oral daily  glucagon  Injectable 1 milliGRAM(s) IntraMuscular once PRN  hydrALAZINE Injectable 10 milliGRAM(s) IV Push every 6 hours PRN  insulin lispro (HumaLOG) corrective regimen sliding scale   SubCutaneous three times a day before meals  insulin lispro (HumaLOG) corrective regimen sliding scale   SubCutaneous at bedtime  metoprolol tartrate 25 milliGRAM(s) Oral every 8 hours  nitroglycerin     SubLingual 0.4 milliGRAM(s) SubLingual every 5 minutes PRN    Vitals   T(C): 36.4 (06-18-20 @ 08:35), Max: 37.7 (06-18-20 @ 08:03)  HR: 78 (06-18-20 @ 08:35) (63 - 86)  BP: 153/78 (06-18-20 @ 08:35) (112/85 - 192/90)  RR: 18 (06-18-20 @ 08:03) (16 - 18)  SpO2: 96% (06-18-20 @ 08:03) (96% - 97%)    NEURO: A & O x 3, no focal neurologic deficits  HEENT: NCAT, EOMI, PERRLA  NECK: No JVD, No carotid bruits B/L, +2 Carotid pulses B/L  PULM:  CTA B/L No W/R/R  CARD: RRR, +S1, +S2, No M/R/G  ABD: ND, +BS, NT, no masses  EXT: Warm, pedal edema yes/no, pitting/non-pitting    TTE Echo Complete w/o Contrast w/ Doppler (06.17.20  Summary:   1. Normal left atrial size.   2. There is moderate concentric left ventricular hypertrophy.   3. Segmental wall motion abnormalities in RCA territory.   4. Left ventricular ejection fraction, by visual estimation, is 60 to 65%. Grade I diastolic dysfunction.   5. Normal right atrial size.   6. Normal right ventricular size and function.   7. No significant valvular abnormality.   8. There is no evidence of pericardial effusion.                              13.6   9.33  )-----------( 307      ( 18 Jun 2020 07:36 )             41.2     06-18    137  |  102  |  14.0  ----------------------------<  133<H>  4.3   |  24.0  |  0.56    Ca    9.2      18 Jun 2020 07:36  Phos  3.1     06-18  Mg     2.2     06-18    TPro  8.3  /  Alb  4.4  /  TBili  1.0  /  DBili  x   /  AST  18  /  ALT  12  /  AlkPhos  124<H>  06-16    CARDIAC MARKERS ( 17 Jun 2020 07:56 )  x     / <0.01 ng/mL / x     / x     / x      CARDIAC MARKERS ( 17 Jun 2020 00:02 )  x     / <0.01 ng/mL / x     / x     / x      CARDIAC MARKERS ( 16 Jun 2020 15:10 )  x     / <0.01 ng/mL / x     / x     / x Interventional Cardiology NP Precath Note    Pt presents for cardiac cath due to chest pain       COVId positive     Mallampati 2  ASA 2  BRA 1.5  GFR     PAST MEDICAL & SURGICAL HISTORY:  COVID-19  Obesity  Hyperlipidemia  Hypertension  Type 2 diabetes mellitus  No significant past surgical history    MEDS:   aspirin  chewable 81 milliGRAM(s) Oral daily  atorvastatin 40 milliGRAM(s) Oral at bedtime  enalapril 10 milliGRAM(s) Oral daily  glucagon  Injectable 1 milliGRAM(s) IntraMuscular once PRN  hydrALAZINE Injectable 10 milliGRAM(s) IV Push every 6 hours PRN  insulin lispro (HumaLOG) corrective regimen sliding scale   SubCutaneous three times a day before meals  insulin lispro (HumaLOG) corrective regimen sliding scale   SubCutaneous at bedtime  metoprolol tartrate 25 milliGRAM(s) Oral every 8 hours  nitroglycerin     SubLingual 0.4 milliGRAM(s) SubLingual every 5 minutes PRN    Vitals   T(C): 36.4 (06-18-20 @ 08:35), Max: 37.7 (06-18-20 @ 08:03)  HR: 78 (06-18-20 @ 08:35) (63 - 86)  BP: 153/78 (06-18-20 @ 08:35) (112/85 - 192/90)  RR: 18 (06-18-20 @ 08:03) (16 - 18)  SpO2: 96% (06-18-20 @ 08:03) (96% - 97%)    NEURO: A & O x 3, no focal neurologic deficits  HEENT: NCAT, EOMI, PERRLA  NECK: No JVD, No carotid bruits B/L, +2 Carotid pulses B/L  PULM:  CTA B/L No W/R/R  CARD: RRR, +S1, +S2, No M/R/G  ABD: ND, +BS, NT, no masses  EXT: Warm, pedal edema yes/no, pitting/non-pitting    TTE Echo Complete w/o Contrast w/ Doppler (06.17.20  Summary:   1. Normal left atrial size.   2. There is moderate concentric left ventricular hypertrophy.   3. Segmental wall motion abnormalities in RCA territory.   4. Left ventricular ejection fraction, by visual estimation, is 60 to 65%. Grade I diastolic dysfunction.   5. Normal right atrial size.   6. Normal right ventricular size and function.   7. No significant valvular abnormality.   8. There is no evidence of pericardial effusion.                              13.6   9.33  )-----------( 307      ( 18 Jun 2020 07:36 )             41.2     06-18    137  |  102  |  14.0  ----------------------------<  133<H>  4.3   |  24.0  |  0.56    Ca    9.2      18 Jun 2020 07:36  Phos  3.1     06-18  Mg     2.2     06-18    TPro  8.3  /  Alb  4.4  /  TBili  1.0  /  DBili  x   /  AST  18  /  ALT  12  /  AlkPhos  124<H>  06-16    CARDIAC MARKERS ( 17 Jun 2020 07:56 )  x     / <0.01 ng/mL / x     / x     / x      CARDIAC MARKERS ( 17 Jun 2020 00:02 )  x     / <0.01 ng/mL / x     / x     / x      CARDIAC MARKERS ( 16 Jun 2020 15:10 )  x     / <0.01 ng/mL / x     / x     / x

## 2020-06-18 NOTE — PROGRESS NOTE ADULT - ATTENDING COMMENTS
Pt is seen, examined, chart reviewed, d/w np/pa.  Management as outlined above.  Chest Pain-CAD  Plan for cath today 6/18 Chart reviewed, d/w np/pa.  Management as outlined above.  Chest Pain-CAD  Plan for cath today 6/18

## 2020-06-18 NOTE — DISCHARGE NOTE PROVIDER - HOSPITAL COURSE
s/p LAD JONATHAN x one 61 y/o  female who has been having intermittent exertional chest pain in epigastric area described as non-radiating pressure sensation, associated with dyspnea. Pt was seen at [facility] and sent to ER. Pt states she is compliant with medications and always has difficulty controlling her BP. Of note, pt had symptoms of decreased taste/smell in April 2020, now resolved, and recently tested positive for COVID-19.  Pt. had recent echo done on 5/2020- EF 50-55%, grade I diastolic dysfx., moderate LVH, normal RV size and fx.,PA pressure 37.8 borderline pulm. HTN . ECG-ST HR @ 117 with no ischemic changes. Admitted for chest pain R/O ACS and HTVE emergency. Trop negative x3. BNP wnl. Recently, pt completed stress which showed 5/2020- EF 54%, ischemic ECG changes, severe ischemia in p.LAD territory, mild ischemia in the RCA territory. Cardiology consulted and given pts coronary artery calcifications. LHC done during admission on 6/18/2020, was noted to have significant blockage, LM normal, Proximal LAD 80 %, Mid %, Distal OM1 80%, RCA normal, RPDA and  RPDL   60% respectively, s/p JONATHAN to LAD x one with proximal LAD. Pt was also COVID positive, asymptomatic.         Patient was medically optimized and improved clinically throughout hospital. Patient seen and examined on day of discharge.        Vital Signs Last 24 Hrs    T(C): 37.4 (19 Jun 2020 08:14), Max: 37.7 (19 Jun 2020 00:50)    T(F): 99.3 (19 Jun 2020 08:14), Max: 99.9 (19 Jun 2020 00:50)    HR: 79 (19 Jun 2020 08:14) (68 - 88)    BP: 154/82 (19 Jun 2020 08:14) (128/74 - 169/94)    BP(mean): --    RR: 19 (19 Jun 2020 08:14) (18 - 19)    SpO2: 95% (19 Jun 2020 08:14) (93% - 97%)        Physical Exam:    General: Well developed  female lying in bed not in distress.     HEENT: AT, NC.    Neck: supple. no JVD, no bruits present    PULM: CTA bilaterally, no w /r/r.     CARDIO: S1,S2. RRR. no heart murmur. no edema.     Abdomen: soft. non-tender. non-distended. + BS.     Ext: no calf tenderness. ROM of all ext. intact. distal pulses intact.    Right Radial site good capillary refill, no signs of bleeding. Right Femoral cath site no evidence of bleeding, bandage c/d/i, non tender.       Neuro: AAO x3. no focal weakness. CNS intact.     Skin: no rash noted, warm and dry.     Psych : co-operative, flat, depressed affect        Patient is medically stable and cleared for discharge. Patient is a 61 y/o  female who has been having intermittent exertional chest pain in epigastric area described as non-radiating pressure sensation, associated with dyspnea. Pt was seen at [facility] and sent to ER. Pt states she is compliant with medications and always has difficulty controlling her BP. Of note, pt had symptoms of decreased taste/smell in April 2020, now resolved, and recently tested positive for COVID-19.  Pt. had recent echo done on 5/2020- EF 50-55%, grade I diastolic dysfx., moderate LVH, normal RV size and fx.,PA pressure 37.8 borderline pulm. HTN . ECG-ST HR @ 117 with no ischemic changes. Admitted for chest pain R/O ACS and HTVE emergency. Trop negative x3. BNP wnl. Recently, pt completed stress which showed 5/2020- EF 54%, ischemic ECG changes, severe ischemia in p.LAD territory, mild ischemia in the RCA territory. Cardiology consulted and given pts coronary artery calcifications. LHC done during admission on 6/18/2020, was noted to have significant blockage, LM normal, Proximal LAD 80 %, Mid %, Distal OM1 80%, RCA normal, RPDA and  RPDL   60% respectively, s/p JONATHAN to LAD x one with proximal LAD. Pt was also COVID positive, asymptomatic.         Patient was medically optimized and improved clinically throughout hospital. Patient seen and examined on day of discharge.    Total time coordinating this discharge was 40 minutes. Spouse updated routinely during the hospital stay        VS and Exam on the day of discharge:     Vital Signs Last 24 Hrs    T(C): 37.4 (19 Jun 2020 08:14), Max: 37.7 (19 Jun 2020 00:50)    T(F): 99.3 (19 Jun 2020 08:14), Max: 99.9 (19 Jun 2020 00:50)    HR: 79 (19 Jun 2020 08:14) (68 - 88)    BP: 154/82 (19 Jun 2020 08:14) (128/74 - 169/94)    RR: 19 (19 Jun 2020 08:14) (18 - 19)    SpO2: 95% (19 Jun 2020 08:14) (93% - 97%)        Physical Exam:    General: Well developed  female lying in bed not in distress.     HEENT: AT, NC.    PULM: CTA bilaterally, no w /r/r.     CARDIO: S1,S2. RRR. no heart murmur. no edema.     Abdomen: soft. non-tender. non-distended. + BS.     Ext: no calf tenderness. ROM of all ext. intact. distal pulses intact.    Right Radial site good capillary refill, no signs of bleeding. Right Femoral cath site no evidence of bleeding, bandage c/d/i, non tender.       Neuro: AAO x3. no focal weakness. CNS intact.     Skin: no rash noted, warm and dry.     Psych : co-operative, flat, depressed affect

## 2020-06-18 NOTE — DISCHARGE NOTE PROVIDER - NSDCFUADDINST_GEN_ALL_CORE_FT
abdominal pain As tolerated. Restricted use with no heavy lifting of affected arm for 48 hours.  No submerging the arm in water for 48 hours.  You may start showering today.  Call your doctor for any bleeding, swelling, loss of sensation in the hand or fingers, or fingers turning blue.  If heavy bleeding or large lumps form, hold pressure at the spot and come to the Emergency Room.  - Bruising at the groin, sometimes extending down the leg, and/or a small lump under the skin at the groin access site is normal and will resolve within 2 – 3 weeks.   - Occasional skipped beats or palpitations that last for a few beats are common and generally resolve within 1-2 months.   - You may walk and take stairs at a regular pace.   - Do not perform any exercise more strenuous than walking for 1 week.   - Do not strain or lift heavy objects for 1 week.  - You may shower the day after the procedure.  - Do not soak in water (such as tub baths, hot tubs, swimming, etc.) for 1 week.   - You may resume all other activities the day after the procedure.  Call your doctor if:   - you notice bleeding, redness, drainage, swelling, increased tenderness or a hot sensation around the catheter insertion site.   - your temperature is greater than 100 degrees F for more than 24 hours.  - your rapid heart rhythm returns.  - you have any questions or concerns regarding the procedure.  If significant bleeding and/or a large lump (the size of a golf ball or bigger) occurs:  - Lie flat and apply continuous direct pressure just above the puncture site for at least 10 minutes  - If the issue resolves, notify your physician immediately.    - If the bleeding cannot be controlled, please seek immediate medical attention.  If you experience increased difficulty breathing or chest pain, or if you faint or have dizzy spells, please seek immediate medical attention. 13.6   11.03 )-----------( 276      ( 19 Jun 2020 06:58 )             41.5   06-19    136  |  101  |  21.0<H>  ----------------------------<  131<H>  3.9   |  21.0<L>  |  0.70    Ca    9.2      19 Jun 2020 06:58  Phos  3.1     06-18  Mg     2.2     06-18    TPro  7.5  /  Alb  3.6  /  TBili  1.3  /  DBili  x   /  AST  16  /  ALT  10  /  AlkPhos  100  06-19    Echo 6/18   PHYSICIAN INTERPRETATION:  Pericardium: Trivial pericardial effusion is present. The pericardial effusion is anterior to the right ventricle.       Summary:   1. Technically limited study.   2. Limited study for pericardial effusion. There is trivial pericardial effusion seen.

## 2020-06-18 NOTE — PROGRESS NOTE ADULT - ASSESSMENT
60y F w/ hx HTN, HLD, DM, presenting for chest pain.  Pt states that for the past 4 days, she has been having intermittent exertional chest pain described as non-radiating pressure sensation, associated with dyspnea  Pt with echo and stress testing abnormal but  Troponin negative   Covid positive without respiratory symptoms  s/p Cardiac cath   LM normal  Proximal LAD 80 %   Mid %   Distal OM1 80 %   RCA normal   RPDA and  RPDL   60% respectively   s/p JONATHAN to LAD x one with 60y F w/ hx HTN, HLD, DM, presenting for chest pain.  Pt states that for the past 4 days, she has been having intermittent exertional chest pain described as non-radiating pressure sensation, associated with dyspnea  Pt with echo and stress testing abnormal but  Troponin negative   Covid positive without respiratory symptoms  s/p Cardiac cath   LM normal  Proximal LAD 80 %   Mid %   Distal OM1 80 %   RCA normal   RPDA and  RPDL   60% respectively   s/p JONATHAN to LAD x one with proximal LAD

## 2020-06-19 ENCOUNTER — TRANSCRIPTION ENCOUNTER (OUTPATIENT)
Age: 60
End: 2020-06-19

## 2020-06-19 VITALS — DIASTOLIC BLOOD PRESSURE: 78 MMHG | HEART RATE: 96 BPM | SYSTOLIC BLOOD PRESSURE: 150 MMHG

## 2020-06-19 LAB
ALBUMIN SERPL ELPH-MCNC: 3.6 G/DL — SIGNIFICANT CHANGE UP (ref 3.3–5.2)
ALP SERPL-CCNC: 100 U/L — SIGNIFICANT CHANGE UP (ref 40–120)
ALT FLD-CCNC: 10 U/L — SIGNIFICANT CHANGE UP
ANION GAP SERPL CALC-SCNC: 14 MMOL/L — SIGNIFICANT CHANGE UP (ref 5–17)
AST SERPL-CCNC: 16 U/L — SIGNIFICANT CHANGE UP
BASOPHILS # BLD AUTO: 0.05 K/UL — SIGNIFICANT CHANGE UP (ref 0–0.2)
BASOPHILS NFR BLD AUTO: 0.5 % — SIGNIFICANT CHANGE UP (ref 0–2)
BILIRUB SERPL-MCNC: 1.3 MG/DL — SIGNIFICANT CHANGE UP (ref 0.4–2)
BUN SERPL-MCNC: 21 MG/DL — HIGH (ref 8–20)
CALCIUM SERPL-MCNC: 9.2 MG/DL — SIGNIFICANT CHANGE UP (ref 8.6–10.2)
CHLORIDE SERPL-SCNC: 101 MMOL/L — SIGNIFICANT CHANGE UP (ref 98–107)
CO2 SERPL-SCNC: 21 MMOL/L — LOW (ref 22–29)
CREAT SERPL-MCNC: 0.7 MG/DL — SIGNIFICANT CHANGE UP (ref 0.5–1.3)
EOSINOPHIL # BLD AUTO: 0.11 K/UL — SIGNIFICANT CHANGE UP (ref 0–0.5)
EOSINOPHIL NFR BLD AUTO: 1 % — SIGNIFICANT CHANGE UP (ref 0–6)
GLUCOSE BLDC GLUCOMTR-MCNC: 126 MG/DL — HIGH (ref 70–99)
GLUCOSE BLDC GLUCOMTR-MCNC: 127 MG/DL — HIGH (ref 70–99)
GLUCOSE BLDC GLUCOMTR-MCNC: 147 MG/DL — HIGH (ref 70–99)
GLUCOSE SERPL-MCNC: 131 MG/DL — HIGH (ref 70–99)
HCT VFR BLD CALC: 41.5 % — SIGNIFICANT CHANGE UP (ref 34.5–45)
HGB BLD-MCNC: 13.6 G/DL — SIGNIFICANT CHANGE UP (ref 11.5–15.5)
IMM GRANULOCYTES NFR BLD AUTO: 0.3 % — SIGNIFICANT CHANGE UP (ref 0–1.5)
LYMPHOCYTES # BLD AUTO: 19.8 % — SIGNIFICANT CHANGE UP (ref 13–44)
LYMPHOCYTES # BLD AUTO: 2.18 K/UL — SIGNIFICANT CHANGE UP (ref 1–3.3)
MCHC RBC-ENTMCNC: 28.9 PG — SIGNIFICANT CHANGE UP (ref 27–34)
MCHC RBC-ENTMCNC: 32.8 GM/DL — SIGNIFICANT CHANGE UP (ref 32–36)
MCV RBC AUTO: 88.3 FL — SIGNIFICANT CHANGE UP (ref 80–100)
MONOCYTES # BLD AUTO: 0.8 K/UL — SIGNIFICANT CHANGE UP (ref 0–0.9)
MONOCYTES NFR BLD AUTO: 7.3 % — SIGNIFICANT CHANGE UP (ref 2–14)
NEUTROPHILS # BLD AUTO: 7.86 K/UL — HIGH (ref 1.8–7.4)
NEUTROPHILS NFR BLD AUTO: 71.1 % — SIGNIFICANT CHANGE UP (ref 43–77)
PLATELET # BLD AUTO: 276 K/UL — SIGNIFICANT CHANGE UP (ref 150–400)
POTASSIUM SERPL-MCNC: 3.9 MMOL/L — SIGNIFICANT CHANGE UP (ref 3.5–5.3)
POTASSIUM SERPL-SCNC: 3.9 MMOL/L — SIGNIFICANT CHANGE UP (ref 3.5–5.3)
PROT SERPL-MCNC: 7.5 G/DL — SIGNIFICANT CHANGE UP (ref 6.6–8.7)
RBC # BLD: 4.7 M/UL — SIGNIFICANT CHANGE UP (ref 3.8–5.2)
RBC # FLD: 13.8 % — SIGNIFICANT CHANGE UP (ref 10.3–14.5)
SODIUM SERPL-SCNC: 136 MMOL/L — SIGNIFICANT CHANGE UP (ref 135–145)
WBC # BLD: 11.03 K/UL — HIGH (ref 3.8–10.5)
WBC # FLD AUTO: 11.03 K/UL — HIGH (ref 3.8–10.5)

## 2020-06-19 PROCEDURE — 93010 ELECTROCARDIOGRAM REPORT: CPT

## 2020-06-19 PROCEDURE — 99239 HOSP IP/OBS DSCHRG MGMT >30: CPT | Mod: GC

## 2020-06-19 RX ORDER — METOPROLOL TARTRATE 50 MG
1 TABLET ORAL
Qty: 30 | Refills: 0
Start: 2020-06-19 | End: 2020-07-18

## 2020-06-19 RX ORDER — ATORVASTATIN CALCIUM 80 MG/1
1 TABLET, FILM COATED ORAL
Qty: 0 | Refills: 0 | DISCHARGE

## 2020-06-19 RX ORDER — ATORVASTATIN CALCIUM 80 MG/1
1 TABLET, FILM COATED ORAL
Qty: 30 | Refills: 0
Start: 2020-06-19 | End: 2020-07-18

## 2020-06-19 RX ORDER — ASPIRIN/CALCIUM CARB/MAGNESIUM 324 MG
1 TABLET ORAL
Qty: 0 | Refills: 0 | DISCHARGE

## 2020-06-19 RX ORDER — LISINOPRIL 2.5 MG/1
1 TABLET ORAL
Qty: 0 | Refills: 0 | DISCHARGE

## 2020-06-19 RX ORDER — CLOPIDOGREL BISULFATE 75 MG/1
1 TABLET, FILM COATED ORAL
Qty: 30 | Refills: 0
Start: 2020-06-19 | End: 2020-07-18

## 2020-06-19 RX ORDER — METOPROLOL TARTRATE 50 MG
1 TABLET ORAL
Qty: 0 | Refills: 0 | DISCHARGE

## 2020-06-19 RX ORDER — ASPIRIN/CALCIUM CARB/MAGNESIUM 324 MG
1 TABLET ORAL
Qty: 30 | Refills: 0
Start: 2020-06-19 | End: 2020-07-18

## 2020-06-19 RX ADMIN — Medication 81 MILLIGRAM(S): at 12:15

## 2020-06-19 RX ADMIN — Medication 10 MILLIGRAM(S): at 05:57

## 2020-06-19 RX ADMIN — Medication 25 MILLIGRAM(S): at 05:59

## 2020-06-19 RX ADMIN — CLOPIDOGREL BISULFATE 75 MILLIGRAM(S): 75 TABLET, FILM COATED ORAL at 12:15

## 2020-06-19 RX ADMIN — Medication 25 MILLIGRAM(S): at 14:12

## 2020-06-19 RX ADMIN — AMLODIPINE BESYLATE 5 MILLIGRAM(S): 2.5 TABLET ORAL at 05:54

## 2020-06-19 NOTE — PROGRESS NOTE ADULT - PROBLEM SELECTOR PLAN 1
ASA and Plavix daily for at least on year   Medical management for non- obstructive disease   continue Atorvastatin daily   Groin and wrist precautions   outpatient follow up with cardiologist   Reviewed with primary medical team and bedside RN

## 2020-06-19 NOTE — PROGRESS NOTE ADULT - SUBJECTIVE AND OBJECTIVE BOX
Pt s/p cardiac cath and intervention done via RRA and RFA   No overnight events         Vital Signs Last 24 Hrs  T(C): 36.7 (19 Jun 2020 05:10), Max: 37.7 (19 Jun 2020 00:50)  T(F): 98 (19 Jun 2020 05:10), Max: 99.9 (19 Jun 2020 00:50)  HR: 68 (19 Jun 2020 07:59) (68 - 88)  BP: 151/82 (19 Jun 2020 05:10) (128/74 - 169/94)  RR: 18 (19 Jun 2020 05:10) (18 - 18)  SpO2: 93% (19 Jun 2020 05:10) (93% - 97%)  Telemetry : NSR rate 60 -70  no ectopy  EKG             Medications:  amLODIPine   Tablet 5 milliGRAM(s) Oral daily  aspirin  chewable 81 milliGRAM(s) Oral daily  atorvastatin 40 milliGRAM(s) Oral at bedtime  clopidogrel Tablet 75 milliGRAM(s) Oral daily  enalapril 10 milliGRAM(s) Oral daily  glucagon  Injectable 1 milliGRAM(s) IntraMuscular once PRN  hydrALAZINE Injectable 10 milliGRAM(s) IV Push every 6 hours PRN  insulin lispro (HumaLOG) corrective regimen sliding scale   SubCutaneous three times a day before meals  insulin lispro (HumaLOG) corrective regimen sliding scale   SubCutaneous at bedtime  metoprolol tartrate 25 milliGRAM(s) Oral every 8 hours  nitroglycerin     SubLingual 0.4 milliGRAM(s) SubLingual every 5 minutes PRN      General: A/ox 3, No acute Distress Liberian   Neck: Supple, NO JVD  Cardiac: S1 S2, No M/R/G  Pulmonary: CTAB, Breathing unlabored, No Rhonchi/Rales/Wheezing  Abdomen: Soft, Non -tender, +BS   Extremities: No Rashes, No edema   Right Radial site good capillary refill + pp no evidence of bleeding fingers warm and mobile    Right Femoral cath site no evidence of bleeding + pp hemostasis maintained    Neuro: A/o x 3, No focal deficits  Psch: normal mood , normal affect    LABS:                          13.6   11.03 )-----------( 276      ( 19 Jun 2020 06:58 )             41.5     06-19    136  |  101  |  21.0<H>  ----------------------------<  131<H>  3.9   |  21.0<L>  |  0.70    Ca    9.2      19 Jun 2020 06:58  Phos  3.1     06-18  Mg     2.2     06-18    TPro  7.5  /  Alb  3.6  /  TBili  1.3  /  DBili  x   /  AST  16  /  ALT  10  /  AlkPhos  100  06-19

## 2020-06-19 NOTE — DISCHARGE NOTE NURSING/CASE MANAGEMENT/SOCIAL WORK - NSDCFUADDAPPT_GEN_ALL_CORE_FT
Please see regular doctor in the community in 2-3 days after discharge   You should also see your cardiologist in 1-2 weeks after discharge       As tolerated. Restricted use with no heavy lifting of affected arm for 48 hours.  No submerging the arm in water for 48 hours.  You may start showering today.  Call your doctor for any bleeding, swelling, loss of sensation in the hand or fingers, or fingers turning blue.  If heavy bleeding or large lumps form, hold pressure at the spot and come to the Emergency Room.  - Bruising at the groin, sometimes extending down the leg, and/or a small lump under the skin at the groin access site is normal and will resolve within 2 – 3 weeks.   - Occasional skipped beats or palpitations that last for a few beats are common and generally resolve within 1-2 months.   - You may walk and take stairs at a regular pace.   - Do not perform any exercise more strenuous than walking for 1 week.   - Do not strain or lift heavy objects for 1 week.  - You may shower the day after the procedure.  - Do not soak in water (such as tub baths, hot tubs, swimming, etc.) for 1 week.   - You may resume all other activities the day after the procedure.  Call your doctor if:   - you notice bleeding, redness, drainage, swelling, increased tenderness or a hot sensation around the catheter insertion site.   - your temperature is greater than 100 degrees F for more than 24 hours.  - your rapid heart rhythm returns.  - you have any questions or concerns regarding the procedure.  If significant bleeding and/or a large lump (the size of a golf ball or bigger) occurs:  - Lie flat and apply continuous direct pressure just above the puncture site for at least 10 minutes  - If the issue resolves, notify your physician immediately.    - If the bleeding cannot be controlled, please seek immediate medical attention.  If you experience increased difficulty breathing or chest pain, or if you faint or have dizzy spells, please seek immediate medical attention.

## 2020-06-19 NOTE — DISCHARGE NOTE NURSING/CASE MANAGEMENT/SOCIAL WORK - PATIENT PORTAL LINK FT
You can access the FollowMyHealth Patient Portal offered by [facility] by registering at the following website: [email or web address]. By joining [employer]’s FollowMyHealth portal, you will also be able to view your health information using other applications (apps) compatible with our system.

## 2020-06-19 NOTE — PROGRESS NOTE ADULT - ASSESSMENT
60y  female PMH:  HTN, HLD, DM, presenting for chest pain.  Pt states that for the past 4 days, she has been having intermittent exertional chest pain described as non-radiating pressure sensation, associated with dyspnea  Pt with echo and stress testing abnormal but  Troponin negative .Covid positive without respiratory symptoms  Presented to cardiac cath lab for Angiogram   s/p Cardiac cath on 6/18   LM normalProximal LAD 80 % Mid % Distal OM1 80 % RCA normal RPDA and  RPDL   60% respectively   s/p JONATHAN to LAD x one with proximal LAD done via RRA and RFA   No overnight events sitting in bed denies chest pain groin pain or SOB   Reports ambulating to BR without issue

## 2020-06-19 NOTE — CHART NOTE - NSCHARTNOTEFT_GEN_A_CORE
Patient seen and examined at bedside. No acute distress and no acute overnight events   No complaints   Wants to know when she can start cleaning her house again. Right groin discomfort.   VS and exam in d/c papers   Plan to d/c home with asa/plavix. Beta blocker and ace (changed to enalapril )     Please see discharge papers for details.

## 2020-06-20 ENCOUNTER — TRANSCRIPTION ENCOUNTER (OUTPATIENT)
Age: 60
End: 2020-06-20

## 2020-06-20 PROCEDURE — 80053 COMPREHEN METABOLIC PANEL: CPT

## 2020-06-20 PROCEDURE — C1887: CPT

## 2020-06-20 PROCEDURE — 93970 EXTREMITY STUDY: CPT

## 2020-06-20 PROCEDURE — 85379 FIBRIN DEGRADATION QUANT: CPT

## 2020-06-20 PROCEDURE — 92978 ENDOLUMINL IVUS OCT C 1ST: CPT | Mod: LD

## 2020-06-20 PROCEDURE — 93308 TTE F-UP OR LMTD: CPT

## 2020-06-20 PROCEDURE — C1894: CPT

## 2020-06-20 PROCEDURE — 93005 ELECTROCARDIOGRAM TRACING: CPT

## 2020-06-20 PROCEDURE — C1760: CPT

## 2020-06-20 PROCEDURE — U0003: CPT

## 2020-06-20 PROCEDURE — 84100 ASSAY OF PHOSPHORUS: CPT

## 2020-06-20 PROCEDURE — 83880 ASSAY OF NATRIURETIC PEPTIDE: CPT

## 2020-06-20 PROCEDURE — 85027 COMPLETE CBC AUTOMATED: CPT

## 2020-06-20 PROCEDURE — 83735 ASSAY OF MAGNESIUM: CPT

## 2020-06-20 PROCEDURE — 84484 ASSAY OF TROPONIN QUANT: CPT

## 2020-06-20 PROCEDURE — 71275 CT ANGIOGRAPHY CHEST: CPT

## 2020-06-20 PROCEDURE — 93306 TTE W/DOPPLER COMPLETE: CPT

## 2020-06-20 PROCEDURE — C1725: CPT

## 2020-06-20 PROCEDURE — C1874: CPT

## 2020-06-20 PROCEDURE — 85730 THROMBOPLASTIN TIME PARTIAL: CPT

## 2020-06-20 PROCEDURE — C1753: CPT

## 2020-06-20 PROCEDURE — 83036 HEMOGLOBIN GLYCOSYLATED A1C: CPT

## 2020-06-20 PROCEDURE — 99153 MOD SED SAME PHYS/QHP EA: CPT

## 2020-06-20 PROCEDURE — C1769: CPT

## 2020-06-20 PROCEDURE — 82962 GLUCOSE BLOOD TEST: CPT

## 2020-06-20 PROCEDURE — 99152 MOD SED SAME PHYS/QHP 5/>YRS: CPT

## 2020-06-20 PROCEDURE — 85610 PROTHROMBIN TIME: CPT

## 2020-06-20 PROCEDURE — 86900 BLOOD TYPING SEROLOGIC ABO: CPT

## 2020-06-20 PROCEDURE — 86850 RBC ANTIBODY SCREEN: CPT

## 2020-06-20 PROCEDURE — 71045 X-RAY EXAM CHEST 1 VIEW: CPT

## 2020-06-20 PROCEDURE — 86901 BLOOD TYPING SEROLOGIC RH(D): CPT

## 2020-06-20 PROCEDURE — T1013: CPT

## 2020-06-20 PROCEDURE — 80048 BASIC METABOLIC PNL TOTAL CA: CPT

## 2020-06-20 PROCEDURE — 99285 EMERGENCY DEPT VISIT HI MDM: CPT | Mod: 25

## 2020-06-20 PROCEDURE — 93458 L HRT ARTERY/VENTRICLE ANGIO: CPT | Mod: 59

## 2020-06-20 PROCEDURE — 96374 THER/PROPH/DIAG INJ IV PUSH: CPT

## 2020-06-20 PROCEDURE — 84443 ASSAY THYROID STIM HORMONE: CPT

## 2020-06-20 PROCEDURE — 80061 LIPID PANEL: CPT

## 2020-06-20 PROCEDURE — 36415 COLL VENOUS BLD VENIPUNCTURE: CPT

## 2020-06-20 PROCEDURE — C9600: CPT | Mod: LD

## 2020-06-20 PROCEDURE — 86803 HEPATITIS C AB TEST: CPT

## 2020-06-24 ENCOUNTER — TRANSCRIPTION ENCOUNTER (OUTPATIENT)
Age: 60
End: 2020-06-24

## 2020-06-24 PROBLEM — U07.1 COVID-19: Chronic | Status: ACTIVE | Noted: 2020-06-16

## 2020-07-09 DIAGNOSIS — U07.1 COVID-19: ICD-10-CM

## 2020-07-13 ENCOUNTER — APPOINTMENT (OUTPATIENT)
Dept: CARDIOLOGY | Facility: CLINIC | Age: 60
End: 2020-07-13

## 2020-07-15 ENCOUNTER — OUTPATIENT (OUTPATIENT)
Dept: OUTPATIENT SERVICES | Facility: HOSPITAL | Age: 60
LOS: 1 days | End: 2020-07-15
Payer: MEDICAID

## 2020-07-15 DIAGNOSIS — G47.33 OBSTRUCTIVE SLEEP APNEA (ADULT) (PEDIATRIC): ICD-10-CM

## 2020-07-15 PROCEDURE — G0399: CPT

## 2020-07-15 PROCEDURE — 95806 SLEEP STUDY UNATT&RESP EFFT: CPT | Mod: 26

## 2020-07-15 PROCEDURE — 95806 SLEEP STUDY UNATT&RESP EFFT: CPT

## 2020-08-24 ENCOUNTER — NON-APPOINTMENT (OUTPATIENT)
Age: 60
End: 2020-08-24

## 2020-08-24 ENCOUNTER — INPATIENT (INPATIENT)
Facility: HOSPITAL | Age: 60
LOS: 1 days | Discharge: HOME | DRG: 305 | End: 2020-08-26
Attending: HOSPITALIST | Admitting: INTERNAL MEDICINE
Payer: MEDICAID

## 2020-08-24 ENCOUNTER — APPOINTMENT (OUTPATIENT)
Dept: CARDIOLOGY | Facility: CLINIC | Age: 60
End: 2020-08-24
Payer: MEDICAID

## 2020-08-24 VITALS
HEIGHT: 61 IN | TEMPERATURE: 98 F | BODY MASS INDEX: 36.25 KG/M2 | SYSTOLIC BLOOD PRESSURE: 230 MMHG | WEIGHT: 192 LBS | DIASTOLIC BLOOD PRESSURE: 110 MMHG | OXYGEN SATURATION: 97 % | RESPIRATION RATE: 18 BRPM | HEART RATE: 93 BPM

## 2020-08-24 VITALS
RESPIRATION RATE: 18 BRPM | SYSTOLIC BLOOD PRESSURE: 232 MMHG | TEMPERATURE: 98 F | OXYGEN SATURATION: 95 % | HEART RATE: 125 BPM | DIASTOLIC BLOOD PRESSURE: 111 MMHG | HEIGHT: 62 IN | WEIGHT: 149.91 LBS

## 2020-08-24 VITALS — SYSTOLIC BLOOD PRESSURE: 218 MMHG | DIASTOLIC BLOOD PRESSURE: 118 MMHG

## 2020-08-24 DIAGNOSIS — I16.1 HYPERTENSIVE EMERGENCY: ICD-10-CM

## 2020-08-24 DIAGNOSIS — I10 ESSENTIAL (PRIMARY) HYPERTENSION: ICD-10-CM

## 2020-08-24 DIAGNOSIS — E78.5 HYPERLIPIDEMIA, UNSPECIFIED: ICD-10-CM

## 2020-08-24 DIAGNOSIS — I25.10 ATHEROSCLEROTIC HEART DISEASE OF NATIVE CORONARY ARTERY WITHOUT ANGINA PECTORIS: ICD-10-CM

## 2020-08-24 DIAGNOSIS — E11.59 TYPE 2 DIABETES MELLITUS WITH OTHER CIRCULATORY COMPLICATIONS: ICD-10-CM

## 2020-08-24 LAB
ALBUMIN SERPL ELPH-MCNC: 4.3 G/DL — SIGNIFICANT CHANGE UP (ref 3.3–5.2)
ALP SERPL-CCNC: 128 U/L — HIGH (ref 40–120)
ALT FLD-CCNC: 12 U/L — SIGNIFICANT CHANGE UP
ANION GAP SERPL CALC-SCNC: 15 MMOL/L — SIGNIFICANT CHANGE UP (ref 5–17)
APPEARANCE UR: CLEAR — SIGNIFICANT CHANGE UP
AST SERPL-CCNC: 18 U/L — SIGNIFICANT CHANGE UP
BACTERIA # UR AUTO: ABNORMAL
BASOPHILS # BLD AUTO: 0.04 K/UL — SIGNIFICANT CHANGE UP (ref 0–0.2)
BASOPHILS NFR BLD AUTO: 0.5 % — SIGNIFICANT CHANGE UP (ref 0–2)
BILIRUB SERPL-MCNC: 0.8 MG/DL — SIGNIFICANT CHANGE UP (ref 0.4–2)
BILIRUB UR-MCNC: NEGATIVE — SIGNIFICANT CHANGE UP
BUN SERPL-MCNC: 12 MG/DL — SIGNIFICANT CHANGE UP (ref 8–20)
CALCIUM SERPL-MCNC: 9.6 MG/DL — SIGNIFICANT CHANGE UP (ref 8.6–10.2)
CHLORIDE SERPL-SCNC: 98 MMOL/L — SIGNIFICANT CHANGE UP (ref 98–107)
CO2 SERPL-SCNC: 25 MMOL/L — SIGNIFICANT CHANGE UP (ref 22–29)
COLOR SPEC: YELLOW — SIGNIFICANT CHANGE UP
CREAT SERPL-MCNC: 0.6 MG/DL — SIGNIFICANT CHANGE UP (ref 0.5–1.3)
DIFF PNL FLD: ABNORMAL
EOSINOPHIL # BLD AUTO: 0.13 K/UL — SIGNIFICANT CHANGE UP (ref 0–0.5)
EOSINOPHIL NFR BLD AUTO: 1.7 % — SIGNIFICANT CHANGE UP (ref 0–6)
EPI CELLS # UR: SIGNIFICANT CHANGE UP
GLUCOSE BLDC GLUCOMTR-MCNC: 136 MG/DL — HIGH (ref 70–99)
GLUCOSE SERPL-MCNC: 130 MG/DL — HIGH (ref 70–99)
GLUCOSE UR QL: NEGATIVE MG/DL — SIGNIFICANT CHANGE UP
HCT VFR BLD CALC: 42.3 % — SIGNIFICANT CHANGE UP (ref 34.5–45)
HGB BLD-MCNC: 14 G/DL — SIGNIFICANT CHANGE UP (ref 11.5–15.5)
IMM GRANULOCYTES NFR BLD AUTO: 0.3 % — SIGNIFICANT CHANGE UP (ref 0–1.5)
KETONES UR-MCNC: NEGATIVE — SIGNIFICANT CHANGE UP
LEUKOCYTE ESTERASE UR-ACNC: NEGATIVE — SIGNIFICANT CHANGE UP
LYMPHOCYTES # BLD AUTO: 1.38 K/UL — SIGNIFICANT CHANGE UP (ref 1–3.3)
LYMPHOCYTES # BLD AUTO: 17.8 % — SIGNIFICANT CHANGE UP (ref 13–44)
MCHC RBC-ENTMCNC: 28.5 PG — SIGNIFICANT CHANGE UP (ref 27–34)
MCHC RBC-ENTMCNC: 33.1 GM/DL — SIGNIFICANT CHANGE UP (ref 32–36)
MCV RBC AUTO: 86 FL — SIGNIFICANT CHANGE UP (ref 80–100)
MONOCYTES # BLD AUTO: 0.39 K/UL — SIGNIFICANT CHANGE UP (ref 0–0.9)
MONOCYTES NFR BLD AUTO: 5 % — SIGNIFICANT CHANGE UP (ref 2–14)
NEUTROPHILS # BLD AUTO: 5.8 K/UL — SIGNIFICANT CHANGE UP (ref 1.8–7.4)
NEUTROPHILS NFR BLD AUTO: 74.7 % — SIGNIFICANT CHANGE UP (ref 43–77)
NITRITE UR-MCNC: NEGATIVE — SIGNIFICANT CHANGE UP
PH UR: 7 — SIGNIFICANT CHANGE UP (ref 5–8)
PLATELET # BLD AUTO: 278 K/UL — SIGNIFICANT CHANGE UP (ref 150–400)
POTASSIUM SERPL-MCNC: 3.8 MMOL/L — SIGNIFICANT CHANGE UP (ref 3.5–5.3)
POTASSIUM SERPL-SCNC: 3.8 MMOL/L — SIGNIFICANT CHANGE UP (ref 3.5–5.3)
PROT SERPL-MCNC: 8.2 G/DL — SIGNIFICANT CHANGE UP (ref 6.6–8.7)
PROT UR-MCNC: 30 MG/DL
RBC # BLD: 4.92 M/UL — SIGNIFICANT CHANGE UP (ref 3.8–5.2)
RBC # FLD: 13 % — SIGNIFICANT CHANGE UP (ref 10.3–14.5)
RBC CASTS # UR COMP ASSIST: SIGNIFICANT CHANGE UP /HPF (ref 0–4)
SODIUM SERPL-SCNC: 138 MMOL/L — SIGNIFICANT CHANGE UP (ref 135–145)
SP GR SPEC: 1 — LOW (ref 1.01–1.02)
TROPONIN T SERPL-MCNC: <0.01 NG/ML — SIGNIFICANT CHANGE UP (ref 0–0.06)
UROBILINOGEN FLD QL: NEGATIVE MG/DL — SIGNIFICANT CHANGE UP
WBC # BLD: 7.76 K/UL — SIGNIFICANT CHANGE UP (ref 3.8–10.5)
WBC # FLD AUTO: 7.76 K/UL — SIGNIFICANT CHANGE UP (ref 3.8–10.5)
WBC UR QL: SIGNIFICANT CHANGE UP

## 2020-08-24 PROCEDURE — 99214 OFFICE O/P EST MOD 30 MIN: CPT

## 2020-08-24 PROCEDURE — 74174 CTA ABD&PLVS W/CONTRAST: CPT | Mod: 26

## 2020-08-24 PROCEDURE — 93010 ELECTROCARDIOGRAM REPORT: CPT

## 2020-08-24 PROCEDURE — 99223 1ST HOSP IP/OBS HIGH 75: CPT

## 2020-08-24 PROCEDURE — 71275 CT ANGIOGRAPHY CHEST: CPT | Mod: 26

## 2020-08-24 PROCEDURE — 70450 CT HEAD/BRAIN W/O DYE: CPT | Mod: 26

## 2020-08-24 PROCEDURE — 99285 EMERGENCY DEPT VISIT HI MDM: CPT

## 2020-08-24 PROCEDURE — 99291 CRITICAL CARE FIRST HOUR: CPT

## 2020-08-24 PROCEDURE — 93000 ELECTROCARDIOGRAM COMPLETE: CPT

## 2020-08-24 RX ORDER — CLOPIDOGREL BISULFATE 75 MG/1
1 TABLET, FILM COATED ORAL
Qty: 0 | Refills: 0 | DISCHARGE

## 2020-08-24 RX ORDER — ASPIRIN/CALCIUM CARB/MAGNESIUM 324 MG
1 TABLET ORAL
Qty: 0 | Refills: 0 | DISCHARGE

## 2020-08-24 RX ORDER — INSULIN LISPRO 100/ML
VIAL (ML) SUBCUTANEOUS
Refills: 0 | Status: DISCONTINUED | OUTPATIENT
Start: 2020-08-24 | End: 2020-08-26

## 2020-08-24 RX ORDER — METOPROLOL TARTRATE 50 MG
100 TABLET ORAL DAILY
Refills: 0 | Status: DISCONTINUED | OUTPATIENT
Start: 2020-08-24 | End: 2020-08-26

## 2020-08-24 RX ORDER — METFORMIN HYDROCHLORIDE 850 MG/1
1 TABLET ORAL
Qty: 0 | Refills: 0 | DISCHARGE

## 2020-08-24 RX ORDER — LABETALOL HCL 100 MG
20 TABLET ORAL ONCE
Refills: 0 | Status: COMPLETED | OUTPATIENT
Start: 2020-08-24 | End: 2020-08-24

## 2020-08-24 RX ORDER — INSULIN GLARGINE 100 [IU]/ML
10 INJECTION, SOLUTION SUBCUTANEOUS AT BEDTIME
Refills: 0 | Status: DISCONTINUED | OUTPATIENT
Start: 2020-08-24 | End: 2020-08-26

## 2020-08-24 RX ORDER — SPIRONOLACTONE 25 MG/1
25 TABLET, FILM COATED ORAL DAILY
Refills: 0 | Status: DISCONTINUED | OUTPATIENT
Start: 2020-08-24 | End: 2020-08-26

## 2020-08-24 RX ORDER — HYDRALAZINE HCL 50 MG
10 TABLET ORAL ONCE
Refills: 0 | Status: COMPLETED | OUTPATIENT
Start: 2020-08-24 | End: 2020-08-24

## 2020-08-24 RX ORDER — INSULIN LISPRO 100/ML
4 VIAL (ML) SUBCUTANEOUS
Refills: 0 | Status: DISCONTINUED | OUTPATIENT
Start: 2020-08-24 | End: 2020-08-26

## 2020-08-24 RX ORDER — CLOPIDOGREL BISULFATE 75 MG/1
75 TABLET, FILM COATED ORAL DAILY
Refills: 0 | Status: DISCONTINUED | OUTPATIENT
Start: 2020-08-25 | End: 2020-08-26

## 2020-08-24 RX ORDER — ENOXAPARIN SODIUM 100 MG/ML
40 INJECTION SUBCUTANEOUS DAILY
Refills: 0 | Status: DISCONTINUED | OUTPATIENT
Start: 2020-08-24 | End: 2020-08-26

## 2020-08-24 RX ORDER — SODIUM CHLORIDE 9 MG/ML
3 INJECTION INTRAMUSCULAR; INTRAVENOUS; SUBCUTANEOUS EVERY 8 HOURS
Refills: 0 | Status: DISCONTINUED | OUTPATIENT
Start: 2020-08-24 | End: 2020-08-26

## 2020-08-24 RX ORDER — DEXTROSE 50 % IN WATER 50 %
12.5 SYRINGE (ML) INTRAVENOUS ONCE
Refills: 0 | Status: DISCONTINUED | OUTPATIENT
Start: 2020-08-24 | End: 2020-08-26

## 2020-08-24 RX ORDER — GLUCAGON INJECTION, SOLUTION 0.5 MG/.1ML
1 INJECTION, SOLUTION SUBCUTANEOUS ONCE
Refills: 0 | Status: DISCONTINUED | OUTPATIENT
Start: 2020-08-24 | End: 2020-08-26

## 2020-08-24 RX ORDER — AMLODIPINE BESYLATE 2.5 MG/1
5 TABLET ORAL DAILY
Refills: 0 | Status: DISCONTINUED | OUTPATIENT
Start: 2020-08-24 | End: 2020-08-24

## 2020-08-24 RX ORDER — ONDANSETRON 8 MG/1
4 TABLET, FILM COATED ORAL EVERY 6 HOURS
Refills: 0 | Status: DISCONTINUED | OUTPATIENT
Start: 2020-08-24 | End: 2020-08-26

## 2020-08-24 RX ORDER — ATORVASTATIN CALCIUM 80 MG/1
1 TABLET, FILM COATED ORAL
Qty: 0 | Refills: 0 | DISCHARGE

## 2020-08-24 RX ORDER — SODIUM CHLORIDE 9 MG/ML
1000 INJECTION, SOLUTION INTRAVENOUS
Refills: 0 | Status: DISCONTINUED | OUTPATIENT
Start: 2020-08-24 | End: 2020-08-26

## 2020-08-24 RX ORDER — AMLODIPINE BESYLATE 2.5 MG/1
10 TABLET ORAL DAILY
Refills: 0 | Status: DISCONTINUED | OUTPATIENT
Start: 2020-08-24 | End: 2020-08-26

## 2020-08-24 RX ORDER — DEXTROSE 50 % IN WATER 50 %
15 SYRINGE (ML) INTRAVENOUS ONCE
Refills: 0 | Status: DISCONTINUED | OUTPATIENT
Start: 2020-08-24 | End: 2020-08-26

## 2020-08-24 RX ORDER — LABETALOL HCL 100 MG
10 TABLET ORAL EVERY 4 HOURS
Refills: 0 | Status: DISCONTINUED | OUTPATIENT
Start: 2020-08-24 | End: 2020-08-26

## 2020-08-24 RX ADMIN — INSULIN GLARGINE 10 UNIT(S): 100 INJECTION, SOLUTION SUBCUTANEOUS at 23:19

## 2020-08-24 RX ADMIN — AMLODIPINE BESYLATE 5 MILLIGRAM(S): 2.5 TABLET ORAL at 19:56

## 2020-08-24 RX ADMIN — Medication 20 MILLIGRAM(S): at 19:56

## 2020-08-24 RX ADMIN — SPIRONOLACTONE 25 MILLIGRAM(S): 25 TABLET, FILM COATED ORAL at 19:56

## 2020-08-24 RX ADMIN — Medication 10 MILLIGRAM(S): at 16:49

## 2020-08-24 RX ADMIN — Medication 20 MILLIGRAM(S): at 16:04

## 2020-08-24 RX ADMIN — Medication 0.1 MILLIGRAM(S): at 19:56

## 2020-08-24 NOTE — CONSULT NOTE ADULT - ATTENDING COMMENTS
hypertensive urgency.  restarted some PO meds .IV labetolol prn with IV hydralzine PRN.   reevaluate Bp and mental status and symptosnm in few hours. If BP > 200 and symptoms, then medical ICU with nicardipine drip or labetolol drip.    Unclear cause of resistant hypertrension. opatient picks up medications from pharmacy. secondary hypertension work up and r/o renal artery stenosis  uncerlying anxiety. Anxiolytics.   If pure resistant Hypertension then will evaluaet for minoxidi.l. Started on aldactone now.   Echo to evaluate for hypovolemic Hypertension .   ]  No ICU for now. will evaluaet for ICU in few hours  Critical care cardiology.  I have personally provided critical care time > 45 mins excluding time spent on separate procedures.

## 2020-08-24 NOTE — HISTORY OF PRESENT ILLNESS
[FreeTextEntry1] : \par HTN\par Pt was in the exam room in our office and was attended with RN and  via the phone.\par I was told that the pt wasn't answering question of the .\par RN noted pt is hypertensive at 220/120 bilaterally. She was noted to have a flat affect, became non conversive for about 5 minutes. She spontaneously returned to normal.\par \par I came to the room and saw and examined the pt (Our office staff, Marc CHAIREZ provided interpretation) She currently denied any symptom. Denied dyspnea, orthopnea, pnd, edema,cp, palpation, syncope, near syncope. \par \par \par FUNCTIONAL CAPACITY\par Estimated <4.0 METS\par \par CHRONIC, STABLE CONDITIONS\par Abnormal chest CT scan. :5 mm right middle lobe subpleural nodule. \par KIRAN\par DM\par \par CARDIAC TESTING\par 1) CTA Chest 6/16/2020: Coronary calcification. No PE.\par 2) Echo 6/2020: TLS. Trival pericardial effusion.\par 3) C 6/18/2020 due to abnormal stress test: \par LAD: prox 80, mid 40, and there is  100%  stenosis in the distal third segment of the vessel, D1 75\par Cx: OM1 90\par RCA: RPDA 50, RPLS 70\par IMpression: Multivessel CAD (prox LAD 80, mid , distal OM 90, RPLA of RCA 70\par INtervention: JONATHAN to proximal  LAD. Unsuccessful attempt to cross the mid-distal LAD  (unable to verify wire position in true lumen). PCI and JONATHAN of proximal LAD done.\par

## 2020-08-24 NOTE — ED ADULT TRIAGE NOTE - HEIGHT IN INCHES
Labs back and look fine. Xrays done and no fracture. Did show some bronchitis secondary to her COPD which is chronic for her. Is she feeling any better? 2

## 2020-08-24 NOTE — H&P ADULT - NSICDXPASTMEDICALHX_GEN_ALL_CORE_FT
PAST MEDICAL HISTORY:  CAD in native artery PCI 6/20    COVID-19     Hyperlipidemia     Hypertension     Obesity     Type 2 diabetes mellitus

## 2020-08-24 NOTE — ED PROVIDER NOTE - PROGRESS NOTE DETAILS
Given the significant and immediate threats to this patient based on initial presentation, the benefits of emergency contrast-enhanced CT imaging without obtaining GFR/creatinine serum level results greatly outweigh the potential risk of harm due to contrast-induced nephropathy Georges: patient seen by cardiolgist, multiple BP meds given. repeat BP improved 151/90. I spoke to hospitalist for admission.

## 2020-08-24 NOTE — ED PROVIDER NOTE - NS ED ROS FT
Review of Systems  •	CONSTITUTIONAL - no  fever, no diaphoresis, no weight change  •	SKIN - no rash  •	HEMATOLOGIC - no bleeding, no bruising  •	EYES - no eye pain, no blurred vision  •	ENT - no change in hearing, no pain  •	RESPIRATORY - no shortness of breath, no cough  •	CARDIAC - (+) chest pain, no palpitations  •	GI - no abd pain, no nausea, no vomiting, no diarrhea, no constipation, no bleeding  •	GENITO-URINARY - no discharge, no dysuria; no hematuria,   •	ENDO - no polydipsia, no polyuria, no heat/no cold intolerance  •	MUSCULOSKELETAL - no joint pain, no swelling, no redness  •	NEUROLOGIC - no weakness,  (+) headache, no anesthesia, no paresthesias  •	PSYCH - no anxiety, non suicidal, non homicidal, no hallucination, no depression

## 2020-08-24 NOTE — ED PROVIDER NOTE - PHYSICAL EXAMINATION
VITAL SIGNS: I have reviewed nursing notes and confirm.  CONSTITUTIONAL: Well-developed; well-nourished; in no acute distress.  SKIN: Skin exam is warm and dry, no acute rash.  HEAD: Normocephalic; atraumatic.  EYES: PERRL, EOM intact; conjunctiva and sclera clear.  ENT: No nasal discharge; airway clear. Throat clear.  NECK: Supple; non tender.    CARD: S1, S2 normal; Regular rate and rhythm.  RESP: No wheezes,  no rales or rhonchi.   ABD:  soft; non-distended; non-tender;   EXT: Normal ROM. No clubbing, cyanosis or edema. (+) 2+ peripheral pulse in all 4 extremities  NEURO: Alert, oriented. Grossly unremarkable. No focal deficits. no facial droop, moves all extremities,  normal gait   PSYCH: Cooperative, appropriate.

## 2020-08-24 NOTE — H&P ADULT - PROBLEM SELECTOR PLAN 1
Admit to tele, cont. home meds, DASH diet, prn Hydralazine, repeat EKG in AM, check for secondary causes of HTN ie HOWARD.

## 2020-08-24 NOTE — PHARMACOTHERAPY INTERVENTION NOTE - COMMENTS
Called pharmacy to obtain medication list. Verified meds with patient at bedside. Confirmed she has not taken any of her medications today
Beta Lactam Allergy Assessment    Current Allergies As Documented in [facility]    penicillin (Rash)    Patient Interview:   (Reviewed with [X] patient [  ] other _____________)    1.  How long ago was your reaction to penicillin/amoxicillin?  Was it greater than 10 years ago?  Reaction to PCN was greater than 10 years ago    2.  Did the reaction happen immediately (within 1 hour) after receiving the medication?  Occurred a couple of days after administration of intramuscular PCN injection    3.  What was the reaction to the penicillin/amoxicillin?  Hives, pruritis    4.  Did you have difficulty breathing due to the penicillin/amoxicillin?  No    5.  Were there any skin blisters or mouth sores that prevented you from eating as a result from the penicillin/amoxicillin?  No    6.  Did you have to be admitted to the hospital or require treatment as a result of the penicillin/amoxicillin reaction?  As per patient, hives and itching resolved on their own without intervention    7.  What other beta-lactams have you tolerated in the past? (i.e. cephalexin, cefuroxime, ceftriaxone, cefdinir, cefpodoxime, cefepime, Augmentin, amoxicillin, etc.)  Patient has avoided all PCN since initial reaction.     Chart review:  [X]  No documentation of other beta-lactam administration  [   ]  Patient previously tolerated the following antibiotics (include month/yr):

## 2020-08-24 NOTE — H&P ADULT - HISTORY OF PRESENT ILLNESS
61 y/o female with history of CAD s/p PCI in 6/20, HTN, HLD, DM-2. Patient sent in from PMD for uncontrolled HTN. Patient admits to missing medications today, but otherwise states she doesn't miss any doses and is strict with her salt restricted diet. Patient went to the office today c/o palpitations, HA, visual changes, and CP. Noted to have a SBP >230. Patient in ED was noted to be as high as 270? She had a negative head CT, a negative dissection study. EKG with strain pattern. Given multiple IV/PO meds. Seen by Cardiology. Currently with no complaints after reduction of blood pressure. Denies focal weakness, N/V, SOB, or leg edema.

## 2020-08-24 NOTE — CONSULT NOTE ADULT - ASSESSMENT
Pt is a 61 y/o female with medical history of HTN, DM, CAD s/p JONATHAN to pLAD, HLD who presents to [facility]-ED from PCP office for HTN emergency. Pt states that she went to PCP for check up and was told that her SBP >200. Pt also c/o chest pain, palpitations, and headache. Chest Pain described as pressure, lasting for a duration of one hour at least once a day for past 3 days, relieved with antihypertensives, and rest, worsens with activity, 8/10. Headache located above right eye, causing associated blurry vision in both eyes. Pt denies SOB, fevers, chills, n/v/d. Pt was brought to [facility]-ED by ambulance, /111 upon arrival. Pt given labetolol, and hydralazine IVP. CT Angio Chest-no aortic dissection. Pt admits to not taking BP meds today, but she states she is otherwise compliant, Trop#1-negative, CT Head-negative. Upon assessment, pt physical exam benign and pt denies chest pain.        Multi-Drug resistant HTN   -ECG-NSR HR @ 89  -Tele-NSR HR @ 90-110s  -Trop#1-negative  -CT Chest- no arotic dissection  -CT Head-negative  -Pt s/p hydralazine and labetolol  -Start pt on home dose of home antihypertensive medication regimen  -Secondary HTN workup ordered (serum Aldosterone, angiotension, US Duplex Kidney-r/o renal artery stenosis)  -If SBP does not decrease to SBP <200 within 2-3 Hours with PO meds on board, pt should be started on Nicardipine gtt and observed in ICU setting

## 2020-08-24 NOTE — ED PROVIDER NOTE - INTERPRETATION
LVH/normal sinus rhythm, Normal axis, Normal IN interval and QRS complex. There are no acute ischemic ST or T-wave changes.

## 2020-08-24 NOTE — ED ADULT NURSE NOTE - CHPI ED NUR SYMPTOMS NEG
no blurred vision/no confusion/no nausea/no change in level of consciousness/no fever/no loss of consciousness/no dizziness/no weakness/no numbness/no vomiting

## 2020-08-24 NOTE — ED PROVIDER NOTE - OBJECTIVE STATEMENT
59 yo M hx of DM, HTN, HDL sent in from [facility] clinic for hypertension, chest pain, and altered mental status. patient report complint with her BP meds. she was found hypertensive sbp 270s. She was given sublingual nitro with improvement of headache. patient report chest pain x 3 days. non-radiating. Patient is AO x 3 but flat affect. priority CT called.

## 2020-08-24 NOTE — PHYSICAL EXAM
[General Appearance - Well Developed] : well developed [Normal Appearance] : normal appearance [General Appearance - Well Nourished] : well nourished [Well Groomed] : well groomed [No Deformities] : no deformities [General Appearance - In No Acute Distress] : no acute distress [Normal Conjunctiva] : the conjunctiva exhibited no abnormalities [No Oral Pallor] : no oral pallor [Normal Oral Mucosa] : normal oral mucosa [Eyelids - No Xanthelasma] : the eyelids demonstrated no xanthelasmas [No Oral Cyanosis] : no oral cyanosis [Normal Jugular Venous A Waves Present] : normal jugular venous A waves present [Normal Jugular Venous V Waves Present] : normal jugular venous V waves present [No Jugular Venous Hernandez A Waves] : no jugular venous hernandez A waves [Exaggerated Use Of Accessory Muscles For Inspiration] : no accessory muscle use [Heart Rate And Rhythm] : heart rate and rhythm were normal [Auscultation Breath Sounds / Voice Sounds] : lungs were clear to auscultation bilaterally [Heart Sounds] : normal S1 and S2 [Murmurs] : no murmurs present [Abdomen Tenderness] : non-tender [Abdomen Soft] : soft [Gait - Sufficient For Exercise Testing] : the gait was sufficient for exercise testing [Abnormal Walk] : normal gait [Abdomen Mass (___ Cm)] : no abdominal mass palpated [Nail Clubbing] : no clubbing of the fingernails [Cyanosis, Localized] : no localized cyanosis [Petechial Hemorrhages (___cm)] : no petechial hemorrhages [No Venous Stasis] : no venous stasis [] : no rash [Skin Color & Pigmentation] : normal skin color and pigmentation [No Skin Ulcers] : no skin ulcer [Skin Lesions] : no skin lesions [No Xanthoma] : no  xanthoma was observed [Oriented To Time, Place, And Person] : oriented to person, place, and time [Affect] : the affect was normal [Mood] : the mood was normal [No Anxiety] : not feeling anxious

## 2020-08-24 NOTE — CONSULT NOTE ADULT - SUBJECTIVE AND OBJECTIVE BOX
[facility]                                                               Office:  [address] [address]                                                              Telephone: [phone]. Fax:[phone]                                                                        CARDIOLOGY CONSULTATION NOTE                                                                                             Consult requested by:  Dr. Su  Reason for Consultation: HTN emergency, Chest Pain  History obtained by: Patient and medical record   obtained: No    Chief complaint:    Patient is a 60y old  Female who presents with a chief complaint of chest pain and HTN emergency       HPI: Pt is a 61 y/o female with medical history of HTN, DM, CAD s/p JONATHAN to pLAD, HLD who presents to [facility]-ED from PCP office for HTN emergency. Pt states that she went to PCP for check up and was told that her SBP >200. Pt also c/o chest pain, palpitations, and headache. Chest Pain described as pressure, lasting for a duration of one hour at least once a day for past 3 days, relieved with antihypertensives, and rest, worsens with activity, 8/10. Headache located above right eye, causing associated blurry vision in both eyes. Pt denies SOB, fevers, chills, n/v/d. Pt was brought to [facility]-ED by ambulance, /111 upon arrival. Pt given labetolol, and hydralazine IVP. CT Angio Chest-no aortic dissection. Pt admits to not taking BP meds today, but she states she is otherwise compliant, Trop#1-negative, CT Head-negative. Upon assessment, pt physical exam benign and pt denies chest pain.                   REVIEW OF SYMPTOMS:     CONSTITUTIONAL: No fever, weight loss, or fatigue  ENMT:  No difficulty hearing, tinnitus, vertigo; No sinus or throat pain  NECK: No pain or stiffness  CARDIOVASCULAR: See HPI  RESPIRATORY: No Dyspnea on exertion, Shortness of breath, cough, wheezing  : No dysuria, no hematuria   GI: No dark color stool, no melena, no diarrhea, no constipation, no abdominal pain   NEURO: No headache, no dizziness, no slurred speech   MUSCULOSKELETAL: No joint pain or swelling; No muscle, back, or extremity pain  PSYCH: No agitation, no anxiety.    ALL OTHER REVIEW OF SYSTEMS ARE NEGATIVE.      PREVIOUS DIAGNOSTIC TESTING  ECHO FINDINGS:< from: TTE Echo Complete w/o Contrast w/ Doppler (20 @ 17:58) >  Summary:   1. Normal left atrial size.   2. There is moderate concentric left ventricular hypertrophy.   3. Segmental wall motion abnormalities in RCA territory.   4. Left ventricular ejection fraction, by visual estimation, is 60 to 65%. Grade I diastolic dysfunction.   5. Normal right atrial size.   6. Normal right ventricular size and function.   7. No significant valvular abnormality.   8. There is no evidence of pericardial effusion.    < end of copied text >      CATHETERIZATION FINDINGS: < from: Cardiac Cath Lab - Adult (20 @ 13:48) >  CORONARY VESSELS: The coronary circulation is right dominant.  LM:   --  LM: Normal.  LAD:   --  Proximal LAD: There was a 80 % stenosis. IVUS confirmed LAD 80%  lumen dimaeter stenosis  --  Mid LAD: There was a tubular 40 % stenosis in the proximal third of the  vessel segment. In a second lesion, there was a 100 % stenosis in the  distal third of the vessel segment.  --  D1: The vessel was medium sized. There was a tubular 75 % stenosis in  the middle third of the vessel segment.  CX:   --  Circumflex: The vessel was large sized.  --  OM1: The vessel was large sized. There was a discrete 90 % stenosis in  the distal third of the vessel segment.  RCA:   --  RCA: The vessel was large sized (dominant).  --  RPDA: There was a 50 % stenosis at the ostium of the vessel segment.  --  RPLS: There was a tubular 70 % stenosis in the middle third of the  vessel segment.  COMPLICATIONS: No complications occurred during the cath lab visit.  SUMMARY:  Summary: Addendum 20: Case reconfirmed to remove No LV gram was  performed; however, a recent echocardiogram demonstrated an EF of 60% and  add EF by Ventriculography was 50%  DIAGNOSTIC IMPRESSIONS: Multivessel CAD ( Proximal LAD 80% , mid % ,  distal OM 90% , RPL of RCA 70%)  INTERVENTIONAL IMPRESSIONS: Unsucessful attempt to cross the mid-distal LAD   ( unable to verify wire position in true lumen)  PCI and JONATHAN of proximal LAD using a 3.5 x 15 mm Synergy stent.  INTERVENTIONAL RECOMMENDATIONS: continue on ASA 81 mg and clopidogrel 600  mg now then 75 mg daily for at least 6 months  Atorvastation to lower LDL < 70  Medical management of distal OM1 and RPL disease ( mild ischemia in RCA  territory)    < end of copied text >          ALLERGIES: Allergies    penicillin (Hives; Pruritus)    Intolerances          PAST MEDICAL HISTORY  COVID-19  Obesity  Hyperlipidemia  Hypertension  Type 2 diabetes mellitus  No pertinent past medical history      PAST SURGICAL HISTORY  No significant past surgical history      FAMILY HISTORY:  FHx: hypertension: Father      SOCIAL HISTORY:  Denies smoking/alcohol/drugs        CURRENT MEDICATIONS:  amLODIPine   Tablet 5 milliGRAM(s) Oral daily  cloNIDine  Oral Tab/Cap - Peds 0.1 milliGRAM(s) Oral two times a day  enalapril 20 milliGRAM(s) Oral daily  labetalol Injectable 10 milliGRAM(s) IV Push every 4 hours PRN  spironolactone 25 milliGRAM(s) Oral daily           HOME MEDICATIONS:    amLODIPine 10 mg oral tablet: 1 tab(s) orally once a day (24 Aug 2020 16:24)  aspirin 81 mg oral delayed release tablet: 1 tab(s) orally once a day (24 Aug 2020 16:24)  atorvastatin 40 mg oral tablet: 1 tab(s) orally once a day (24 Aug 2020 16:24)  cloNIDine 0.1 mg oral tablet: 1 tab(s) orally 2 times a day (24 Aug 2020 16:24)  clopidogrel 75 mg oral tablet: 1 tab(s) orally once a day (24 Aug 2020 16:24)  enalapril 20 mg oral tablet: 1 tab(s) orally once a day (24 Aug 2020 16:24)  metFORMIN 1000 mg oral tablet: 1 tab(s) orally 2 times a day (24 Aug 2020 16:24)  metoprolol tartrate 100 mg oral tablet: 1 tab(s) orally once a day (24 Aug 2020 16:24)      Vital Signs Last 24 Hrs  T(C): 36.9 (24 Aug 2020 15:13), Max: 36.9 (24 Aug 2020 15:13)  T(F): 98.4 (24 Aug 2020 15:13), Max: 98.4 (24 Aug 2020 15:13)  HR: 98 (24 Aug 2020 18:55) (82 - 125)  BP: 207/93 (24 Aug 2020 18:55) (183/85 - 242/116)  RR: 18 (24 Aug 2020 18:55) (18 - 24)  SpO2: 98% (24 Aug 2020 18:55) (95% - 98%)      PHYSICAL EXAM:  Constitutional: Comfortable . No acute distress.   HEENT: Atraumatic and normocephalic , neck is supple . no JVD. No carotid bruit. PEERL   CNS: A&Ox3. No focal deficits. EOMI.   Lymph Nodes: Cervical : Not palpable.  Respiratory: CTAB  Cardiovascular: S1S2 RRR. No murmur/rubs or gallop.  Gastrointestinal: Soft non-tender and non distended . +Bowel sounds. negative Pritchard's sign.  Extremities: No edema.   Psychiatric: Calm . no agitation.  Skin: No skin rash/ulcers visualized to face, hands or feet.    Intake and output:     LABS:                        14.0   7.76  )-----------( 278      ( 24 Aug 2020 16:33 )             42.3     08-    138  |  98  |  12.0  ----------------------------<  130<H>  3.8   |  25.0  |  0.60    Ca    9.6      24 Aug 2020 16:33    TPro  8.2  /  Alb  4.3  /  TBili  0.8  /  DBili  x   /  AST  18  /  ALT  12  /  AlkPhos  128<H>  08-    CARDIAC MARKERS ( 24 Aug 2020 16:33 )  x     / <0.01 ng/mL / x     / x     / x        ;p-BNP=    Urinalysis Basic - ( 24 Aug 2020 16:52 )    Color: Yellow / Appearance: Clear / S.005 / pH: x  Gluc: x / Ketone: Negative  / Bili: Negative / Urobili: Negative mg/dL   Blood: x / Protein: 30 mg/dL / Nitrite: Negative   Leuk Esterase: Negative / RBC: 0-2 /HPF / WBC 0-2   Sq Epi: x / Non Sq Epi: Few / Bacteria: Occasional        INTERPRETATION OF TELEMETRY: NSR HR @ 90-110s  ECG: NSR HR @ 89    RADIOLOGY & ADDITIONAL STUDIES:    X-ray: < from: Xray Chest 1 View AP/PA. (20 @ 15:30) >  Patient tested positive for the Covid virus on  and . Patient has dyspnea on exertion, diabetes, and hypertension. Patient has obstructive sleep apnea.    Heart is enlarged.    There is a slight central congestive like picture new since 2019.    IMPRESSION: As above.      < end of copied text >     CT scan: < from: CT Angio Abdomen and Pelvis w/ IV Cont (20 @ 16:05) >  IMPRESSION:  No aortic dissection.    Focal dilatation of the distal descending thoracic aorta, not significantly changed since 2019 measuring 3.4 cm.    2 renal arteries bilaterally; evaluation slightly limited secondary to motion; moderate narrowing at the origin of the right renal arteries; an outpatient MRA could be obtained for further evaluation.    Central low-attenuation in the uterus, possibly a thickened endometrium; an outpatient pelvic ultrasound is recommended for further evaluation.    < end of copied text >    US: Pending

## 2020-08-24 NOTE — ED ADULT NURSE NOTE - NSIMPLEMENTINTERV_GEN_ALL_ED
Implemented All Universal Safety Interventions:  [place] to call system. Call bell, personal items and telephone within reach. Instruct patient to call for assistance. Room bathroom lighting operational. Non-slip footwear when patient is off stretcher. Physically safe environment: no spills, clutter or unnecessary equipment. Stretcher in lowest position, wheels locked, appropriate side rails in place.

## 2020-08-24 NOTE — ED ADULT TRIAGE NOTE - CHIEF COMPLAINT QUOTE
sent here from MD office for high blood pressure and headache pt is not taking her medications at home, pt was given nitro 2 tabs. also c/o chest pain when her pressure is high x 2 days, MD called to bedside for eval

## 2020-08-24 NOTE — ED PROVIDER NOTE - CLINICAL SUMMARY MEDICAL DECISION MAKING FREE TEXT BOX
61 yo F hx of DM, HTN, HDL sent in for uncontrolled hypertension from [facility], reported 270s. She was given nitro by EMS. upon arrival BP 230s and 240s. CT head negative. CTA chest abd pelvis showed no dissection, EKG with LVH, lateral t wave inversion unchanged. trop negative. patient given multiple antihypertensive meds. seen by cardiology. will need admission for BP control and secondary HTN work up.

## 2020-08-24 NOTE — ED ADULT NURSE NOTE - OBJECTIVE STATEMENT
Pt A&Ox4 states "I had a headache and the clinic called the ambulance." Patient is feeling better no headache at this time

## 2020-08-25 ENCOUNTER — TRANSCRIPTION ENCOUNTER (OUTPATIENT)
Age: 60
End: 2020-08-25

## 2020-08-25 LAB
A1C WITH ESTIMATED AVERAGE GLUCOSE RESULT: 6.7 % — HIGH (ref 4–5.6)
ANION GAP SERPL CALC-SCNC: 15 MMOL/L — SIGNIFICANT CHANGE UP (ref 5–17)
BASOPHILS # BLD AUTO: 0.05 K/UL — SIGNIFICANT CHANGE UP (ref 0–0.2)
BASOPHILS NFR BLD AUTO: 0.6 % — SIGNIFICANT CHANGE UP (ref 0–2)
BUN SERPL-MCNC: 15 MG/DL — SIGNIFICANT CHANGE UP (ref 8–20)
CALCIUM SERPL-MCNC: 9.2 MG/DL — SIGNIFICANT CHANGE UP (ref 8.6–10.2)
CHLORIDE SERPL-SCNC: 99 MMOL/L — SIGNIFICANT CHANGE UP (ref 98–107)
CO2 SERPL-SCNC: 25 MMOL/L — SIGNIFICANT CHANGE UP (ref 22–29)
CREAT SERPL-MCNC: 0.72 MG/DL — SIGNIFICANT CHANGE UP (ref 0.5–1.3)
EOSINOPHIL # BLD AUTO: 0.23 K/UL — SIGNIFICANT CHANGE UP (ref 0–0.5)
EOSINOPHIL NFR BLD AUTO: 3 % — SIGNIFICANT CHANGE UP (ref 0–6)
ESTIMATED AVERAGE GLUCOSE: 146 MG/DL — HIGH (ref 68–114)
GLUCOSE BLDC GLUCOMTR-MCNC: 104 MG/DL — HIGH (ref 70–99)
GLUCOSE BLDC GLUCOMTR-MCNC: 116 MG/DL — HIGH (ref 70–99)
GLUCOSE BLDC GLUCOMTR-MCNC: 131 MG/DL — HIGH (ref 70–99)
GLUCOSE BLDC GLUCOMTR-MCNC: 134 MG/DL — HIGH (ref 70–99)
GLUCOSE BLDC GLUCOMTR-MCNC: 147 MG/DL — HIGH (ref 70–99)
GLUCOSE SERPL-MCNC: 144 MG/DL — HIGH (ref 70–99)
HCT VFR BLD CALC: 40.4 % — SIGNIFICANT CHANGE UP (ref 34.5–45)
HGB BLD-MCNC: 13.2 G/DL — SIGNIFICANT CHANGE UP (ref 11.5–15.5)
IMM GRANULOCYTES NFR BLD AUTO: 0.1 % — SIGNIFICANT CHANGE UP (ref 0–1.5)
LYMPHOCYTES # BLD AUTO: 1.57 K/UL — SIGNIFICANT CHANGE UP (ref 1–3.3)
LYMPHOCYTES # BLD AUTO: 20.2 % — SIGNIFICANT CHANGE UP (ref 13–44)
MAGNESIUM SERPL-MCNC: 2.1 MG/DL — SIGNIFICANT CHANGE UP (ref 1.6–2.6)
MCHC RBC-ENTMCNC: 28.1 PG — SIGNIFICANT CHANGE UP (ref 27–34)
MCHC RBC-ENTMCNC: 32.7 GM/DL — SIGNIFICANT CHANGE UP (ref 32–36)
MCV RBC AUTO: 86.1 FL — SIGNIFICANT CHANGE UP (ref 80–100)
MONOCYTES # BLD AUTO: 0.58 K/UL — SIGNIFICANT CHANGE UP (ref 0–0.9)
MONOCYTES NFR BLD AUTO: 7.5 % — SIGNIFICANT CHANGE UP (ref 2–14)
NEUTROPHILS # BLD AUTO: 5.33 K/UL — SIGNIFICANT CHANGE UP (ref 1.8–7.4)
NEUTROPHILS NFR BLD AUTO: 68.6 % — SIGNIFICANT CHANGE UP (ref 43–77)
PHOSPHATE SERPL-MCNC: 3.3 MG/DL — SIGNIFICANT CHANGE UP (ref 2.4–4.7)
PLATELET # BLD AUTO: 264 K/UL — SIGNIFICANT CHANGE UP (ref 150–400)
POTASSIUM SERPL-MCNC: 4 MMOL/L — SIGNIFICANT CHANGE UP (ref 3.5–5.3)
POTASSIUM SERPL-SCNC: 4 MMOL/L — SIGNIFICANT CHANGE UP (ref 3.5–5.3)
RBC # BLD: 4.69 M/UL — SIGNIFICANT CHANGE UP (ref 3.8–5.2)
RBC # FLD: 13.4 % — SIGNIFICANT CHANGE UP (ref 10.3–14.5)
SARS-COV-2 RNA SPEC QL NAA+PROBE: DETECTED
SODIUM SERPL-SCNC: 139 MMOL/L — SIGNIFICANT CHANGE UP (ref 135–145)
WBC # BLD: 7.77 K/UL — SIGNIFICANT CHANGE UP (ref 3.8–10.5)
WBC # FLD AUTO: 7.77 K/UL — SIGNIFICANT CHANGE UP (ref 3.8–10.5)

## 2020-08-25 PROCEDURE — 99232 SBSQ HOSP IP/OBS MODERATE 35: CPT

## 2020-08-25 PROCEDURE — 93975 VASCULAR STUDY: CPT | Mod: 26

## 2020-08-25 RX ADMIN — SODIUM CHLORIDE 3 MILLILITER(S): 9 INJECTION INTRAMUSCULAR; INTRAVENOUS; SUBCUTANEOUS at 05:14

## 2020-08-25 RX ADMIN — ENOXAPARIN SODIUM 40 MILLIGRAM(S): 100 INJECTION SUBCUTANEOUS at 11:58

## 2020-08-25 RX ADMIN — Medication 4 UNIT(S): at 12:05

## 2020-08-25 RX ADMIN — Medication 4 UNIT(S): at 16:18

## 2020-08-25 RX ADMIN — SODIUM CHLORIDE 3 MILLILITER(S): 9 INJECTION INTRAMUSCULAR; INTRAVENOUS; SUBCUTANEOUS at 21:12

## 2020-08-25 RX ADMIN — Medication 100 MILLIGRAM(S): at 05:14

## 2020-08-25 RX ADMIN — INSULIN GLARGINE 10 UNIT(S): 100 INJECTION, SOLUTION SUBCUTANEOUS at 21:18

## 2020-08-25 RX ADMIN — SODIUM CHLORIDE 3 MILLILITER(S): 9 INJECTION INTRAMUSCULAR; INTRAVENOUS; SUBCUTANEOUS at 13:05

## 2020-08-25 RX ADMIN — Medication 0.1 MILLIGRAM(S): at 17:08

## 2020-08-25 RX ADMIN — CLOPIDOGREL BISULFATE 75 MILLIGRAM(S): 75 TABLET, FILM COATED ORAL at 11:58

## 2020-08-25 RX ADMIN — AMLODIPINE BESYLATE 10 MILLIGRAM(S): 2.5 TABLET ORAL at 05:14

## 2020-08-25 RX ADMIN — Medication 0.1 MILLIGRAM(S): at 05:14

## 2020-08-25 NOTE — PROGRESS NOTE ADULT - ASSESSMENT
Pt is a 61 y/o female with medical history of HTN, DM, CAD s/p JONATHAN to pLAD, HLD who presents to [facility]-ED from PCP office for HTN emergency. Pt states that she went to PCP for check up and was told that her SBP >200. Pt also c/o chest pain, palpitations, and headache. Chest Pain described as pressure, lasting for a duration of one hour at least once a day for past 3 days, relieved with antihypertensives, and rest, worsens with activity, 8/10. Headache located above right eye, causing associated blurry vision in both eyes. Pt denies SOB, fevers, chills, n/v/d. Pt was brought to [facility]-ED by ambulance, /111 upon arrival. Pt given labetolol, and hydralazine IVP. CT Angio Chest-no aortic dissection. Pt admits to not taking BP meds today, but she states she is otherwise compliant, Trop#1-negative, CT Head-negative. Upon assessment, pt physical exam benign and pt denies chest pain.      8/25: Pt     Multi-Drug resistant HTN   -ECG-NSR HR @ 89  -Tele-NSR HR @ 90-110s  -Trop#1-negative  -CT Chest- no arotic dissection  -CT Head-negative  -Pt s/p hydralazine and labetolol  -Start pt on home dose of home antihypertensive medication regimen  -Secondary HTN workup ordered (serum Aldosterone, angiotension, US Duplex Kidney-r/o renal artery stenosis)  -If SBP does not decrease to SBP <200 within 2-3 Hours with PO meds on board, pt should be started on Nicardipine gtt and observed in ICU setting Pt is a 59 y/o female with medical history of HTN, DM, CAD s/p JONATHAN to pLAD, HLD who presents to [facility]-ED from PCP office for HTN emergency. Pt states that she went to PCP for check up and was told that her SBP >200. Pt also c/o chest pain, palpitations, and headache. Chest Pain described as pressure, lasting for a duration of one hour at least once a day for past 3 days, relieved with antihypertensives, and rest, worsens with activity, 8/10. Headache located above right eye, causing associated blurry vision in both eyes. Pt denies SOB, fevers, chills, n/v/d. Pt was brought to [facility]-ED by ambulance, /111 upon arrival. Pt given labetolol, and hydralazine IVP. CT Angio Chest-no aortic dissection. Pt admits to not taking BP meds today, but she states she is otherwise compliant, Trop#1-negative, CT Head-negative. Upon assessment, pt physical exam benign and pt denies chest pain.      8/25: Pt denies chest pain, palpitations, or SOB. Pt /66, normotensive after home doses of BP meds given. US Renal- shows evidence of greater than 60% stenosis in right renal artery in single renal artery. Not high enough stenosis to cause symptoms and HTN reading. Aldosterone and angiotensin pending. Pt may be DC'd with outpatient cardiology follow up.       Multi-Drug resistant HTN   -ECG-NSR HR @ 89  -Tele-NSR HR @ 90-110s  -Trop#1-negative  -CT Chest- no arotic dissection  -CT Head-negative  -Pt s/p hydralazine and labetolol  -Start pt on home dose of home antihypertensive medication regimen  -Secondary HTN workup ordered and pending lab results  -US Renal- shows evidence of greater than 60% stenosis in right renal artery in single renal artery  -Pt may be DC'd with outpatient cardiology follow up.

## 2020-08-25 NOTE — PROGRESS NOTE ADULT - SUBJECTIVE AND OBJECTIVE BOX
HOSPITALIST PROGRESS NOTE    GAURAV SOLIMAN  [id number]  60yFemale    Patient is a 60y old  Female who presents with a chief complaint of HTN Emergency (25 Aug 2020 13:58)      SUBJECTIVE:   Chart reviewed since admission  Patient seen and examined at bedside for Hypertensive urgency    Mild frontal headache  No longer having dizziness, chest pain, palpitations  Denies any dyspnea, abdominal pain      OBJECTIVE:  Vital Signs Last 24 Hrs  T(C): 36.8 (25 Aug 2020 07:48), Max: 36.9 (24 Aug 2020 15:13)  T(F): 98.2 (25 Aug 2020 07:48), Max: 98.5 (24 Aug 2020 22:56)  HR: 65 (25 Aug 2020 07:48) (65 - 125)  BP: 133/63 (25 Aug 2020 07:48) (133/63 - 242/116)  BP(mean): 108 (24 Aug 2020 23:04) (108 - 108)  RR: 18 (25 Aug 2020 07:48) (16 - 24)  SpO2: 94% (25 Aug 2020 07:48) (94% - 98%)    PHYSICAL EXAMINATION  General: Lying in bed, NAD  HEENT:  extraocular movements intact, no asymmetry  NECK:  supple  CVS: regular rate and rhythm S1S2  RESP:  CTAB  GI:  Soft nondistended nontender BS+  : No suprapubic tenderness  MSK:  FROM - no edema  CNS:  NO gross focal or global deficit noted  INTEG:  warm dry skin  PSYCH:  Fair mood    MONITOR:  CAPILLARY BLOOD GLUCOSE      POCT Blood Glucose.: 134 mg/dL (25 Aug 2020 12:04)  POCT Blood Glucose.: 131 mg/dL (25 Aug 2020 11:56)  POCT Blood Glucose.: 147 mg/dL (25 Aug 2020 07:47)  POCT Blood Glucose.: 136 mg/dL (24 Aug 2020 23:18)        I&O's Summary                          13.2   7.77  )-----------( 264      ( 25 Aug 2020 08:08 )             40.4       08-25    139  |  99  |  15.0  ----------------------------<  144<H>  4.0   |  25.0  |  0.72    Ca    9.2      25 Aug 2020 08:08  Phos  3.3     08-25  Mg     2.1     08-25    TPro  8.2  /  Alb  4.3  /  TBili  0.8  /  DBili  x   /  AST  18  /  ALT  12  /  AlkPhos  128<H>  -    CARDIAC MARKERS ( 24 Aug 2020 16:33 )  x     / <0.01 ng/mL / x     / x     / x          Urinalysis Basic - ( 24 Aug 2020 16:52 )    Color: Yellow / Appearance: Clear / S.005 / pH: x  Gluc: x / Ketone: Negative  / Bili: Negative / Urobili: Negative mg/dL   Blood: x / Protein: 30 mg/dL / Nitrite: Negative   Leuk Esterase: Negative / RBC: 0-2 /HPF / WBC 0-2   Sq Epi: x / Non Sq Epi: Few / Bacteria: Occasional        Culture:    TTE:    RADIOLOGY        MEDICATIONS  (STANDING):  amLODIPine   Tablet 10 milliGRAM(s) Oral daily  cloNIDine  Oral Tab/Cap - Peds 0.1 milliGRAM(s) Oral two times a day  clopidogrel Tablet 75 milliGRAM(s) Oral daily  dextrose 5%. 1000 milliLiter(s) (50 mL/Hr) IV Continuous <Continuous>  dextrose 50% Injectable 12.5 Gram(s) IV Push once  enalapril 20 milliGRAM(s) Oral daily  enoxaparin Injectable 40 milliGRAM(s) SubCutaneous daily  insulin glargine Injectable (LANTUS) 10 Unit(s) SubCutaneous at bedtime  insulin lispro (HumaLOG) corrective regimen sliding scale   SubCutaneous Before meals and at bedtime  insulin lispro Injectable (HumaLOG) 4 Unit(s) SubCutaneous before breakfast  insulin lispro Injectable (HumaLOG) 4 Unit(s) SubCutaneous before lunch  insulin lispro Injectable (HumaLOG) 4 Unit(s) SubCutaneous before dinner  metoprolol succinate  milliGRAM(s) Oral daily  sodium chloride 0.9% lock flush 3 milliLiter(s) IV Push every 8 hours  spironolactone 25 milliGRAM(s) Oral daily      MEDICATIONS  (PRN):  dextrose 40% Gel 15 Gram(s) Oral once PRN Blood Glucose LESS THAN 70 milliGRAM(s)/deciliter  glucagon  Injectable 1 milliGRAM(s) IntraMuscular once PRN Glucose LESS THAN 70 milligrams/deciliter  labetalol Injectable 10 milliGRAM(s) IV Push every 4 hours PRN Systolic blood pressure >180  ondansetron Injectable 4 milliGRAM(s) IV Push every 6 hours PRN Nausea

## 2020-08-25 NOTE — PROGRESS NOTE ADULT - ATTENDING COMMENTS
Pt is seen, examined, chart reviewed, d/w np/pa.  Management as outlined above.  Multi-Drug resistant HTN   CT Chest- no aortic dissection; CT Head-negative  Secondary HTN workup ordered and pending lab results  US Renal- shows evidence of greater than 60% stenosis in right renal artery in single renal artery  Pt may be DC'd with outpatient cardiology follow up.

## 2020-08-25 NOTE — DISCHARGE NOTE NURSING/CASE MANAGEMENT/SOCIAL WORK - PATIENT PORTAL LINK FT
You can access the FollowMyHealth Patient Portal offered by [facility] by registering at the following website: [email or web address]. By joining [facility]’s FollowMyHealth portal, you will also be able to view your health information using other applications (apps) compatible with our system.

## 2020-08-25 NOTE — PROGRESS NOTE ADULT - SUBJECTIVE AND OBJECTIVE BOX
[place] [facility]                                                               Office: [address] [address]                                                              Telephone: [phone]. Fax:[phone]                                                                             PROGRESS NOTE  Reason for follow up: HTN Emergency  Overnight: No new events.   Update:     Subjective: "  "      	  Vitals:  T(C): 36.8 (08-25-20 @ 07:48), Max: 36.9 (08-24-20 @ 15:13)  HR: 65 (08-25-20 @ 07:48) (65 - 125)  BP: 133/63 (08-25-20 @ 07:48) (133/63 - 242/116)  RR: 18 (08-25-20 @ 07:48) (16 - 24)  SpO2: 94% (08-25-20 @ 07:48) (94% - 98%)    I&O's Summary    Weight (kg): 68 (08-24 @ 15:13)      PHYSICAL EXAM:  Appearance: Comfortable. No acute distress  HEENT:  Head and neck: Atraumatic. Normocephalic.  Normal oral mucosa, PERRL, Neck is supple. No JVD, No carotid bruit.   Neurologic: A & O x 3, no focal deficits. EOMI.  Lymphatic: No cervical lymphadenopathy  Cardiovascular: Normal S1 S2, No murmur, rubs/gallops. No JVD, No edema  Respiratory: Lungs clear to auscultation  Gastrointestinal:  Soft, Non-tender, + BS  Lower Extremities: No edema  Psychiatry: Patient is calm. No agitation. Mood & affect appropriate  Skin: No rashes/ ecchymoses/cyanosis/ulcers visualized on the face, hands or feet.      CURRENT MEDICATIONS:  amLODIPine   Tablet 10 milliGRAM(s) Oral daily  cloNIDine  Oral Tab/Cap - Peds 0.1 milliGRAM(s) Oral two times a day  enalapril 20 milliGRAM(s) Oral daily  labetalol Injectable 10 milliGRAM(s) IV Push every 4 hours PRN  metoprolol succinate  milliGRAM(s) Oral daily  spironolactone 25 milliGRAM(s) Oral daily  dextrose 50% Injectable  insulin glargine Injectable (LANTUS)  insulin lispro (HumaLOG) corrective regimen sliding scale  insulin lispro Injectable (HumaLOG)  insulin lispro Injectable (HumaLOG)  insulin lispro Injectable (HumaLOG)  clopidogrel Tablet  dextrose 5%.  enoxaparin Injectable  sodium chloride 0.9% lock flush      DIAGNOSTIC TESTING:  [ ] Echocardiogram: < from: TTE Echo Complete w/o Contrast w/ Doppler (06.17.20 @ 17:58) >  Summary:   1. Normal left atrial size.   2. There is moderate concentric left ventricular hypertrophy.   3. Segmental wall motion abnormalities in RCA territory.   4. Left ventricular ejection fraction, by visual estimation, is 60 to 65%. Grade I diastolic dysfunction.   5. Normal right atrial size.   6. Normal right ventricular size and function.   7. No significant valvular abnormality.   8. There is no evidence of pericardial effusion.    < end of copied text >    [ ]  Catheterization:< from: Cardiac Cath Lab - Adult (06.18.20 @ 13:48) >  CORONARY VESSELS: The coronary circulation is right dominant.  LM:   --  LM: Normal.  LAD:   --  Proximal LAD: There was a 80 % stenosis. IVUS confirmed LAD 80%  lumen dimaeter stenosis  --  Mid LAD: There was a tubular 40 % stenosis in the proximal third of the  vessel segment. In a second lesion, there was a 100 % stenosis in the  distal third of the vessel segment.  --  D1: The vessel was medium sized. There was a tubular 75 % stenosis in  the middle third of the vessel segment.  CX:   --  Circumflex: The vessel was large sized.  --  OM1: The vessel was large sized. There was a discrete 90 % stenosis in  the distal third of the vessel segment.  RCA:   --  RCA: The vessel was large sized (dominant).  --  RPDA: There was a 50 % stenosis at the ostium of the vessel segment.  --  RPLS: There was a tubular 70 % stenosis in the middle third of the  vessel segment.  COMPLICATIONS: No complications occurred during the cath lab visit.  SUMMARY:  Summary: Addendum 6/22/20: Case reconfirmed to remove No LV gram was  performed; however, a recent echocardiogram demonstrated an EF of 60% and  add EF by Ventriculography was 50%  DIAGNOSTIC IMPRESSIONS: Multivessel CAD ( Proximal LAD 80% , mid % ,  distal OM 90% , RPL of RCA 70%)  INTERVENTIONAL IMPRESSIONS: Unsucessful attempt to cross the mid-distal LAD   ( unable to verify wire position in true lumen)  PCI and JONATHAN of proximal LAD using a 3.5 x 15 mm Synergy stent.  INTERVENTIONAL RECOMMENDATIONS: continue on ASA 81 mg and clopidogrel 600  mg now then 75 mg daily for at least 6 months  Atorvastation to lower LDL < 70  Medical management of distal OM1 and RPL disease ( mild ischemia in RCA  territory)  Cardiac rehabilitation referral  Info provided to enroll in cardiac rehab  Cardiac rehab communicated with regarding patient.    < end of copied text >      OTHER: 	  US: < from: US Duplex Kidneys (08.25.20 @ 09:55) >  IMPRESSION:    Only a single renal artery was seen bilaterally; second renal artery to each kidney was not seen on ultrasound.    Elevated peak systolic velocity in the visualized right renal artery consistent with a greater than 60% stenosis.    Mildly elevated peak systolic velocity in the mid visualized left renal artery which is discordant with the findings on the prior CTA.    < end of copied text >      LABS:	 	  CARDIAC MARKERS ( 24 Aug 2020 16:33 )  x     / <0.01 ng/mL / x     / x     / x      p-BNP 24 Aug 2020 16:33: x                              13.2   7.77  )-----------( 264      ( 25 Aug 2020 08:08 )             40.4     08-25    139  |  99  |  15.0  ----------------------------<  144<H>  4.0   |  25.0  |  0.72    Ca    9.2      25 Aug 2020 08:08  Phos  3.3     08-25  Mg     2.1     08-25    TPro  8.2  /  Alb  4.3  /  TBili  0.8  /  DBili  x   /  AST  18  /  ALT  12  /  AlkPhos  128<H>  08-24            TELEMETRY: [place] [facility]                                                       [facility]                                                               Office: [address] [address]                                                              Telephone: [phone]. Fax:[phone]                                                                             PROGRESS NOTE  Reason for follow up: HTN Emergency  Overnight: No new events.   Update: 8/25: Pt denies chest pain, palpitations, or SOB. Pt /66, normotensive after home doses of BP meds given. US Renal- shows evidence of greater than 60% stenosis in right renal artery in single renal artery. Not high enough stenosis to cause symptoms and HTN reading. Aldosterone and angiotensin pending. Pt may be DC'd with outpatient cardiology follow up.    Subjective: "I don't feel pain  "      	  Vitals:  T(C): 36.8 (08-25-20 @ 07:48), Max: 36.9 (08-24-20 @ 15:13)  HR: 65 (08-25-20 @ 07:48) (65 - 125)  BP: 133/63 (08-25-20 @ 07:48) (133/63 - 242/116)  RR: 18 (08-25-20 @ 07:48) (16 - 24)  SpO2: 94% (08-25-20 @ 07:48) (94% - 98%)    I&O's Summary    Weight (kg): 68 (08-24 @ 15:13)      PHYSICAL EXAM:  Appearance: Comfortable. No acute distress  HEENT:  Head and neck: Atraumatic. Normocephalic.  Normal oral mucosa, PERRL, Neck is supple. No JVD, No carotid bruit.   Neurologic: A & O x 3, no focal deficits. EOMI.  Lymphatic: No cervical lymphadenopathy  Cardiovascular: Normal S1 S2, No murmur, rubs/gallops. No JVD, No edema  Respiratory: Lungs clear to auscultation  Gastrointestinal:  Soft, Non-tender, + BS  Lower Extremities: No edema  Psychiatry: Patient is calm. No agitation. Mood & affect appropriate  Skin: No rashes/ ecchymoses/cyanosis/ulcers visualized on the face, hands or feet.      CURRENT MEDICATIONS:  amLODIPine   Tablet 10 milliGRAM(s) Oral daily  cloNIDine  Oral Tab/Cap - Peds 0.1 milliGRAM(s) Oral two times a day  enalapril 20 milliGRAM(s) Oral daily  labetalol Injectable 10 milliGRAM(s) IV Push every 4 hours PRN  metoprolol succinate  milliGRAM(s) Oral daily  spironolactone 25 milliGRAM(s) Oral daily  dextrose 50% Injectable  insulin glargine Injectable (LANTUS)  insulin lispro (HumaLOG) corrective regimen sliding scale  insulin lispro Injectable (HumaLOG)  insulin lispro Injectable (HumaLOG)  insulin lispro Injectable (HumaLOG)  clopidogrel Tablet  dextrose 5%.  enoxaparin Injectable  sodium chloride 0.9% lock flush      DIAGNOSTIC TESTING:  [ ] Echocardiogram: < from: TTE Echo Complete w/o Contrast w/ Doppler (06.17.20 @ 17:58) >  Summary:   1. Normal left atrial size.   2. There is moderate concentric left ventricular hypertrophy.   3. Segmental wall motion abnormalities in RCA territory.   4. Left ventricular ejection fraction, by visual estimation, is 60 to 65%. Grade I diastolic dysfunction.   5. Normal right atrial size.   6. Normal right ventricular size and function.   7. No significant valvular abnormality.   8. There is no evidence of pericardial effusion.    < end of copied text >    [ ]  Catheterization:< from: Cardiac Cath Lab - Adult (06.18.20 @ 13:48) >  CORONARY VESSELS: The coronary circulation is right dominant.  LM:   --  LM: Normal.  LAD:   --  Proximal LAD: There was a 80 % stenosis. IVUS confirmed LAD 80%  lumen dimaeter stenosis  --  Mid LAD: There was a tubular 40 % stenosis in the proximal third of the  vessel segment. In a second lesion, there was a 100 % stenosis in the  distal third of the vessel segment.  --  D1: The vessel was medium sized. There was a tubular 75 % stenosis in  the middle third of the vessel segment.  CX:   --  Circumflex: The vessel was large sized.  --  OM1: The vessel was large sized. There was a discrete 90 % stenosis in  the distal third of the vessel segment.  RCA:   --  RCA: The vessel was large sized (dominant).  --  RPDA: There was a 50 % stenosis at the ostium of the vessel segment.  --  RPLS: There was a tubular 70 % stenosis in the middle third of the  vessel segment.  COMPLICATIONS: No complications occurred during the cath lab visit.  SUMMARY:  Summary: Addendum 6/22/20: Case reconfirmed to remove No LV gram was  performed; however, a recent echocardiogram demonstrated an EF of 60% and  add EF by Ventriculography was 50%  DIAGNOSTIC IMPRESSIONS: Multivessel CAD ( Proximal LAD 80% , mid % ,  distal OM 90% , RPL of RCA 70%)  INTERVENTIONAL IMPRESSIONS: Unsucessful attempt to cross the mid-distal LAD   ( unable to verify wire position in true lumen)  PCI and JONATHAN of proximal LAD using a 3.5 x 15 mm Synergy stent.  INTERVENTIONAL RECOMMENDATIONS: continue on ASA 81 mg and clopidogrel 600  mg now then 75 mg daily for at least 6 months  Atorvastation to lower LDL < 70  Medical management of distal OM1 and RPL disease ( mild ischemia in RCA  territory)  Cardiac rehabilitation referral  Info provided to enroll in cardiac rehab  Cardiac rehab communicated with regarding patient.    < end of copied text >      OTHER: 	  US: < from: US Duplex Kidneys (08.25.20 @ 09:55) >  IMPRESSION:    Only a single renal artery was seen bilaterally; second renal artery to each kidney was not seen on ultrasound.    Elevated peak systolic velocity in the visualized right renal artery consistent with a greater than 60% stenosis.    Mildly elevated peak systolic velocity in the mid visualized left renal artery which is discordant with the findings on the prior CTA.    < end of copied text >      LABS:	 	  CARDIAC MARKERS ( 24 Aug 2020 16:33 )  x     / <0.01 ng/mL / x     / x     / x      p-BNP 24 Aug 2020 16:33: x                              13.2   7.77  )-----------( 264      ( 25 Aug 2020 08:08 )             40.4     08-25    139  |  99  |  15.0  ----------------------------<  144<H>  4.0   |  25.0  |  0.72    Ca    9.2      25 Aug 2020 08:08  Phos  3.3     08-25  Mg     2.1     08-25    TPro  8.2  /  Alb  4.3  /  TBili  0.8  /  DBili  x   /  AST  18  /  ALT  12  /  AlkPhos  128<H>  08-24            TELEMETRY:  NSR HR @ 60-80s

## 2020-08-25 NOTE — ED PROCEDURE NOTE - PROCEDURE ADDITIONAL DETAILS
Educational US performed after verbal consent from Patient.  See confirmatory study (CT, US, Xray, etc.) for diagnostic results.

## 2020-08-25 NOTE — DISCHARGE NOTE NURSING/CASE MANAGEMENT/SOCIAL WORK - NSDCFUADDAPPT_GEN_ALL_CORE_FT
Tuesday Sept 1st at 10:30-see appt card [facility] in [place]  Tuesday sept 15th at 9:30-see appt card [facility] in [place]

## 2020-08-26 ENCOUNTER — TRANSCRIPTION ENCOUNTER (OUTPATIENT)
Age: 60
End: 2020-08-26

## 2020-08-26 VITALS
OXYGEN SATURATION: 95 % | DIASTOLIC BLOOD PRESSURE: 69 MMHG | SYSTOLIC BLOOD PRESSURE: 154 MMHG | TEMPERATURE: 98 F | HEART RATE: 60 BPM | RESPIRATION RATE: 18 BRPM

## 2020-08-26 LAB
ACE SERPL-CCNC: 6 U/L — LOW (ref 14–82)
ALDOST SERPL-MCNC: 3.6 NG/DL — SIGNIFICANT CHANGE UP
GLUCOSE BLDC GLUCOMTR-MCNC: 128 MG/DL — HIGH (ref 70–99)
GLUCOSE BLDC GLUCOMTR-MCNC: 94 MG/DL — SIGNIFICANT CHANGE UP (ref 70–99)
SARS-COV-2 IGG SERPL QL IA: POSITIVE
SARS-COV-2 IGM SERPL IA-ACNC: 133 AU/ML — HIGH

## 2020-08-26 PROCEDURE — 96375 TX/PRO/DX INJ NEW DRUG ADDON: CPT | Mod: XU

## 2020-08-26 PROCEDURE — 80048 BASIC METABOLIC PNL TOTAL CA: CPT

## 2020-08-26 PROCEDURE — 80053 COMPREHEN METABOLIC PANEL: CPT

## 2020-08-26 PROCEDURE — 36415 COLL VENOUS BLD VENIPUNCTURE: CPT

## 2020-08-26 PROCEDURE — 74174 CTA ABD&PLVS W/CONTRAST: CPT

## 2020-08-26 PROCEDURE — 83036 HEMOGLOBIN GLYCOSYLATED A1C: CPT

## 2020-08-26 PROCEDURE — T1013: CPT

## 2020-08-26 PROCEDURE — 70450 CT HEAD/BRAIN W/O DYE: CPT

## 2020-08-26 PROCEDURE — 93005 ELECTROCARDIOGRAM TRACING: CPT

## 2020-08-26 PROCEDURE — 86769 SARS-COV-2 COVID-19 ANTIBODY: CPT

## 2020-08-26 PROCEDURE — 83735 ASSAY OF MAGNESIUM: CPT

## 2020-08-26 PROCEDURE — 82088 ASSAY OF ALDOSTERONE: CPT

## 2020-08-26 PROCEDURE — 96374 THER/PROPH/DIAG INJ IV PUSH: CPT | Mod: XU

## 2020-08-26 PROCEDURE — 99238 HOSP IP/OBS DSCHRG MGMT 30/<: CPT

## 2020-08-26 PROCEDURE — 84100 ASSAY OF PHOSPHORUS: CPT

## 2020-08-26 PROCEDURE — U0003: CPT

## 2020-08-26 PROCEDURE — 82962 GLUCOSE BLOOD TEST: CPT

## 2020-08-26 PROCEDURE — 84484 ASSAY OF TROPONIN QUANT: CPT

## 2020-08-26 PROCEDURE — 99285 EMERGENCY DEPT VISIT HI MDM: CPT | Mod: 25

## 2020-08-26 PROCEDURE — 81001 URINALYSIS AUTO W/SCOPE: CPT

## 2020-08-26 PROCEDURE — 85027 COMPLETE CBC AUTOMATED: CPT

## 2020-08-26 PROCEDURE — 71275 CT ANGIOGRAPHY CHEST: CPT

## 2020-08-26 PROCEDURE — 93975 VASCULAR STUDY: CPT

## 2020-08-26 PROCEDURE — 82164 ANGIOTENSIN I ENZYME TEST: CPT

## 2020-08-26 RX ORDER — SPIRONOLACTONE 25 MG/1
1 TABLET, FILM COATED ORAL
Qty: 30 | Refills: 0
Start: 2020-08-26 | End: 2020-09-24

## 2020-08-26 RX ADMIN — ENOXAPARIN SODIUM 40 MILLIGRAM(S): 100 INJECTION SUBCUTANEOUS at 12:16

## 2020-08-26 RX ADMIN — SPIRONOLACTONE 25 MILLIGRAM(S): 25 TABLET, FILM COATED ORAL at 05:03

## 2020-08-26 RX ADMIN — Medication 100 MILLIGRAM(S): at 05:03

## 2020-08-26 RX ADMIN — CLOPIDOGREL BISULFATE 75 MILLIGRAM(S): 75 TABLET, FILM COATED ORAL at 12:16

## 2020-08-26 RX ADMIN — Medication 20 MILLIGRAM(S): at 05:03

## 2020-08-26 RX ADMIN — SODIUM CHLORIDE 3 MILLILITER(S): 9 INJECTION INTRAMUSCULAR; INTRAVENOUS; SUBCUTANEOUS at 15:20

## 2020-08-26 RX ADMIN — Medication 0.1 MILLIGRAM(S): at 05:03

## 2020-08-26 RX ADMIN — Medication 4 UNIT(S): at 08:08

## 2020-08-26 RX ADMIN — SODIUM CHLORIDE 3 MILLILITER(S): 9 INJECTION INTRAMUSCULAR; INTRAVENOUS; SUBCUTANEOUS at 05:04

## 2020-08-26 RX ADMIN — Medication 4 UNIT(S): at 12:16

## 2020-08-26 RX ADMIN — AMLODIPINE BESYLATE 10 MILLIGRAM(S): 2.5 TABLET ORAL at 05:03

## 2020-08-26 NOTE — DISCHARGE NOTE PROVIDER - HOSPITAL COURSE
59 y/o female with history of CAD s/p PCI in 6/20, HTN, HLD, DM-2. Patient sent in from PMD for uncontrolled HTN. Patient admits to missing medications today, but otherwise states she doesn't miss any doses and is strict with her salt restricted diet. Patient went to the office today c/o palpitations, HA, visual changes, and CP. Noted to have a SBP >230. Patient in ED was noted to be as high as 270? She had a negative head CT, a negative dissection study. EKG with strain pattern. Given multiple IV/PO meds. Seen by Cardiology. Currently with no complaints after reduction of blood pressure.         Multiple medications were continued - resistant HTN work up initiaited. Right renal artery <60% stenosis; other labs have been sent and pending.        Given patient with resolution of symptoms, will discharge home with follow up with Cardiology.

## 2020-08-26 NOTE — DISCHARGE NOTE PROVIDER - NSDCFUADDAPPT_GEN_ALL_CORE_FT
Tuesday Sept 1st at 10:30-see appt card [facility] in [place]  Tuesday sept 15th at 9:30-see appt card [facility] in [place]

## 2020-08-26 NOTE — DISCHARGE NOTE PROVIDER - CARE PROVIDER_API CALL
Hood Ochoa  CARDIOVASCULAR DISEASE  [address], [place] [address]  Phone: [phone]  Fax: [phone]  Follow Up Time:

## 2020-08-26 NOTE — DISCHARGE NOTE PROVIDER - NSDCMRMEDTOKEN_GEN_ALL_CORE_FT
amLODIPine 10 mg oral tablet: 1 tab(s) orally once a day  aspirin 81 mg oral delayed release tablet: 1 tab(s) orally once a day  atorvastatin 40 mg oral tablet: 1 tab(s) orally once a day  cloNIDine 0.1 mg oral tablet: 1 tab(s) orally 2 times a day  clopidogrel 75 mg oral tablet: 1 tab(s) orally once a day  enalapril 20 mg oral tablet: 1 tab(s) orally once a day  metFORMIN 1000 mg oral tablet: 1 tab(s) orally 2 times a day  metoprolol tartrate 100 mg oral tablet: 1 tab(s) orally once a day  spironolactone 25 mg oral tablet: 1 tab(s) orally once a day

## 2020-08-26 NOTE — DISCHARGE NOTE PROVIDER - NSDCCPCAREPLAN_GEN_ALL_CORE_FT
PRINCIPAL DISCHARGE DIAGNOSIS  Diagnosis: Hypertensive urgency  Assessment and Plan of Treatment: Continue medications as prescibed  Follow up with Cardiology      SECONDARY DISCHARGE DIAGNOSES  Diagnosis: COVID-19 virus IgG antibody detected  Assessment and Plan of Treatment: Follow up with PMD    Diagnosis: CAD (coronary artery disease)  Assessment and Plan of Treatment: Continue home diet and medications    Diagnosis: Dyslipidemia  Assessment and Plan of Treatment: Continue home medications and diet    Diagnosis: T2DM (type 2 diabetes mellitus)  Assessment and Plan of Treatment: Continue home diet and medications

## 2020-08-27 ENCOUNTER — TRANSCRIPTION ENCOUNTER (OUTPATIENT)
Age: 60
End: 2020-08-27

## 2020-09-10 NOTE — DISCHARGE NOTE PROVIDER - NSDCQMSTAIRS_GEN_ALL_CORE
No 08/06/20 - moderate AS, normal LV systolic function; CT heart 08/14/20 - severely calcified aortic valve

## 2021-02-23 PROBLEM — I25.10 ATHEROSCLEROTIC HEART DISEASE OF NATIVE CORONARY ARTERY WITHOUT ANGINA PECTORIS: Chronic | Status: ACTIVE | Noted: 2020-08-24

## 2021-03-29 ENCOUNTER — APPOINTMENT (OUTPATIENT)
Dept: CARDIOLOGY | Facility: CLINIC | Age: 61
End: 2021-03-29
Payer: MEDICAID

## 2021-03-29 ENCOUNTER — NON-APPOINTMENT (OUTPATIENT)
Age: 61
End: 2021-03-29

## 2021-03-29 VITALS — DIASTOLIC BLOOD PRESSURE: 88 MMHG | SYSTOLIC BLOOD PRESSURE: 190 MMHG

## 2021-03-29 VITALS
DIASTOLIC BLOOD PRESSURE: 104 MMHG | TEMPERATURE: 96.9 F | WEIGHT: 197 LBS | HEIGHT: 61 IN | HEART RATE: 100 BPM | SYSTOLIC BLOOD PRESSURE: 190 MMHG | OXYGEN SATURATION: 94 % | BODY MASS INDEX: 37.19 KG/M2

## 2021-03-29 PROCEDURE — 93000 ELECTROCARDIOGRAM COMPLETE: CPT

## 2021-03-29 PROCEDURE — 99072 ADDL SUPL MATRL&STAF TM PHE: CPT

## 2021-03-29 PROCEDURE — 99214 OFFICE O/P EST MOD 30 MIN: CPT

## 2021-03-29 RX ORDER — ASPIRIN ENTERIC COATED TABLETS 81 MG 81 MG/1
81 TABLET, DELAYED RELEASE ORAL
Qty: 30 | Refills: 0 | Status: DISCONTINUED | COMMUNITY
Start: 2020-05-19 | End: 2021-03-29

## 2021-03-29 RX ORDER — METOPROLOL SUCCINATE 100 MG/1
100 TABLET, EXTENDED RELEASE ORAL
Refills: 0 | Status: DISCONTINUED | COMMUNITY
Start: 2020-03-23 | End: 2021-03-29

## 2021-03-29 RX ORDER — ATORVASTATIN CALCIUM 20 MG/1
20 TABLET, FILM COATED ORAL
Qty: 1 | Refills: 1 | Status: DISCONTINUED | COMMUNITY
Start: 2020-03-23 | End: 2021-03-29

## 2021-03-29 RX ORDER — ENALAPRIL MALEATE 20 MG/1
20 TABLET ORAL DAILY
Qty: 90 | Refills: 2 | Status: DISCONTINUED | COMMUNITY
Start: 2020-08-24 | End: 2021-03-29

## 2021-03-29 RX ORDER — LISINOPRIL 40 MG/1
40 TABLET ORAL
Qty: 90 | Refills: 0 | Status: DISCONTINUED | COMMUNITY
Start: 2020-03-23 | End: 2021-03-29

## 2021-03-31 LAB
ANION GAP SERPL CALC-SCNC: 9 MMOL/L
BUN SERPL-MCNC: 13 MG/DL
CALCIUM SERPL-MCNC: 9.8 MG/DL
CHLORIDE SERPL-SCNC: 101 MMOL/L
CO2 SERPL-SCNC: 29 MMOL/L
CREAT SERPL-MCNC: 0.7 MG/DL
GLUCOSE SERPL-MCNC: 189 MG/DL
POTASSIUM SERPL-SCNC: 4.2 MMOL/L
SODIUM SERPL-SCNC: 139 MMOL/L

## 2021-04-05 ENCOUNTER — APPOINTMENT (OUTPATIENT)
Dept: CARDIOLOGY | Facility: CLINIC | Age: 61
End: 2021-04-05
Payer: MEDICAID

## 2021-04-05 VITALS
HEART RATE: 96 BPM | BODY MASS INDEX: 37.38 KG/M2 | TEMPERATURE: 98 F | DIASTOLIC BLOOD PRESSURE: 96 MMHG | HEIGHT: 61 IN | OXYGEN SATURATION: 97 % | SYSTOLIC BLOOD PRESSURE: 190 MMHG | WEIGHT: 198 LBS

## 2021-04-05 DIAGNOSIS — R06.00 DYSPNEA, UNSPECIFIED: ICD-10-CM

## 2021-04-05 PROCEDURE — 99213 OFFICE O/P EST LOW 20 MIN: CPT

## 2021-04-05 PROCEDURE — 99072 ADDL SUPL MATRL&STAF TM PHE: CPT

## 2021-04-26 ENCOUNTER — NON-APPOINTMENT (OUTPATIENT)
Age: 61
End: 2021-04-26

## 2021-04-26 ENCOUNTER — APPOINTMENT (OUTPATIENT)
Dept: CARDIOLOGY | Facility: CLINIC | Age: 61
End: 2021-04-26
Payer: MEDICAID

## 2021-04-26 VITALS — DIASTOLIC BLOOD PRESSURE: 80 MMHG | SYSTOLIC BLOOD PRESSURE: 150 MMHG

## 2021-04-26 VITALS
TEMPERATURE: 97.5 F | HEART RATE: 82 BPM | BODY MASS INDEX: 37.19 KG/M2 | DIASTOLIC BLOOD PRESSURE: 80 MMHG | OXYGEN SATURATION: 95 % | HEIGHT: 61 IN | WEIGHT: 197 LBS | RESPIRATION RATE: 16 BRPM | SYSTOLIC BLOOD PRESSURE: 148 MMHG

## 2021-04-26 PROCEDURE — 99214 OFFICE O/P EST MOD 30 MIN: CPT | Mod: 25

## 2021-04-26 PROCEDURE — 99072 ADDL SUPL MATRL&STAF TM PHE: CPT

## 2021-04-26 PROCEDURE — 93000 ELECTROCARDIOGRAM COMPLETE: CPT

## 2021-04-26 RX ORDER — SPIRONOLACTONE 25 MG/1
25 TABLET ORAL
Qty: 90 | Refills: 3 | Status: DISCONTINUED | COMMUNITY
End: 2021-04-26

## 2021-11-15 ENCOUNTER — APPOINTMENT (OUTPATIENT)
Dept: CARDIOLOGY | Facility: CLINIC | Age: 61
End: 2021-11-15

## 2022-01-26 ENCOUNTER — EMERGENCY (EMERGENCY)
Facility: HOSPITAL | Age: 62
LOS: 1 days | Discharge: HOME | End: 2022-01-26
Attending: EMERGENCY MEDICINE
Payer: MEDICAID

## 2022-01-26 VITALS
OXYGEN SATURATION: 96 % | DIASTOLIC BLOOD PRESSURE: 94 MMHG | RESPIRATION RATE: 20 BRPM | TEMPERATURE: 98 F | SYSTOLIC BLOOD PRESSURE: 165 MMHG

## 2022-01-26 VITALS
DIASTOLIC BLOOD PRESSURE: 105 MMHG | HEART RATE: 103 BPM | HEIGHT: 62 IN | OXYGEN SATURATION: 95 % | RESPIRATION RATE: 18 BRPM | TEMPERATURE: 99 F | SYSTOLIC BLOOD PRESSURE: 206 MMHG

## 2022-01-26 LAB
ALBUMIN SERPL ELPH-MCNC: 4.2 G/DL — SIGNIFICANT CHANGE UP (ref 3.3–5.2)
ALP SERPL-CCNC: 99 U/L — SIGNIFICANT CHANGE UP (ref 40–120)
ALT FLD-CCNC: 12 U/L — SIGNIFICANT CHANGE UP
ANION GAP SERPL CALC-SCNC: 10 MMOL/L — SIGNIFICANT CHANGE UP (ref 5–17)
AST SERPL-CCNC: 15 U/L — SIGNIFICANT CHANGE UP
BASE EXCESS BLDV CALC-SCNC: 4.7 MMOL/L — HIGH (ref -2–3)
BASOPHILS # BLD AUTO: 0.04 K/UL — SIGNIFICANT CHANGE UP (ref 0–0.2)
BASOPHILS NFR BLD AUTO: 0.5 % — SIGNIFICANT CHANGE UP (ref 0–2)
BILIRUB SERPL-MCNC: 0.5 MG/DL — SIGNIFICANT CHANGE UP (ref 0.4–2)
BUN SERPL-MCNC: 15.2 MG/DL — SIGNIFICANT CHANGE UP (ref 8–20)
CA-I SERPL-SCNC: 1.14 MMOL/L — LOW (ref 1.15–1.33)
CALCIUM SERPL-MCNC: 9.1 MG/DL — SIGNIFICANT CHANGE UP (ref 8.6–10.2)
CHLORIDE BLDV-SCNC: 103 MMOL/L — SIGNIFICANT CHANGE UP (ref 98–107)
CHLORIDE SERPL-SCNC: 101 MMOL/L — SIGNIFICANT CHANGE UP (ref 98–107)
CO2 SERPL-SCNC: 24 MMOL/L — SIGNIFICANT CHANGE UP (ref 22–29)
CREAT SERPL-MCNC: 0.73 MG/DL — SIGNIFICANT CHANGE UP (ref 0.5–1.3)
EOSINOPHIL # BLD AUTO: 0.09 K/UL — SIGNIFICANT CHANGE UP (ref 0–0.5)
EOSINOPHIL NFR BLD AUTO: 1.2 % — SIGNIFICANT CHANGE UP (ref 0–6)
FLUAV AG NPH QL: SIGNIFICANT CHANGE UP
FLUBV AG NPH QL: SIGNIFICANT CHANGE UP
GAS PNL BLDV: 137 MMOL/L — SIGNIFICANT CHANGE UP (ref 136–145)
GAS PNL BLDV: SIGNIFICANT CHANGE UP
GAS PNL BLDV: SIGNIFICANT CHANGE UP
GLUCOSE BLDV-MCNC: 209 MG/DL — HIGH (ref 70–99)
GLUCOSE SERPL-MCNC: 212 MG/DL — HIGH (ref 70–99)
HCO3 BLDV-SCNC: 29 MMOL/L — SIGNIFICANT CHANGE UP (ref 22–29)
HCT VFR BLD CALC: 41.5 % — SIGNIFICANT CHANGE UP (ref 34.5–45)
HCT VFR BLDA CALC: 42 % — SIGNIFICANT CHANGE UP (ref 34–46)
HGB BLD CALC-MCNC: 14 G/DL — SIGNIFICANT CHANGE UP (ref 11.7–16.1)
HGB BLD-MCNC: 13.5 G/DL — SIGNIFICANT CHANGE UP (ref 11.5–15.5)
HIV 1 & 2 AB SERPL IA.RAPID: SIGNIFICANT CHANGE UP
IMM GRANULOCYTES NFR BLD AUTO: 0.4 % — SIGNIFICANT CHANGE UP (ref 0–1.5)
LACTATE BLDV-MCNC: 1.6 MMOL/L — SIGNIFICANT CHANGE UP (ref 0.5–2)
LIDOCAIN IGE QN: 21 U/L — LOW (ref 22–51)
LYMPHOCYTES # BLD AUTO: 1.7 K/UL — SIGNIFICANT CHANGE UP (ref 1–3.3)
LYMPHOCYTES # BLD AUTO: 21.8 % — SIGNIFICANT CHANGE UP (ref 13–44)
MCHC RBC-ENTMCNC: 27.4 PG — SIGNIFICANT CHANGE UP (ref 27–34)
MCHC RBC-ENTMCNC: 32.5 GM/DL — SIGNIFICANT CHANGE UP (ref 32–36)
MCV RBC AUTO: 84.3 FL — SIGNIFICANT CHANGE UP (ref 80–100)
MONOCYTES # BLD AUTO: 0.58 K/UL — SIGNIFICANT CHANGE UP (ref 0–0.9)
MONOCYTES NFR BLD AUTO: 7.4 % — SIGNIFICANT CHANGE UP (ref 2–14)
NEUTROPHILS # BLD AUTO: 5.35 K/UL — SIGNIFICANT CHANGE UP (ref 1.8–7.4)
NEUTROPHILS NFR BLD AUTO: 68.7 % — SIGNIFICANT CHANGE UP (ref 43–77)
PCO2 BLDV: 45 MMHG — HIGH (ref 39–42)
PH BLDV: 7.42 — SIGNIFICANT CHANGE UP (ref 7.32–7.43)
PLATELET # BLD AUTO: 304 K/UL — SIGNIFICANT CHANGE UP (ref 150–400)
PO2 BLDV: 91 MMHG — HIGH (ref 25–45)
POTASSIUM BLDV-SCNC: 4.3 MMOL/L — SIGNIFICANT CHANGE UP (ref 3.5–5.1)
POTASSIUM SERPL-MCNC: 4 MMOL/L — SIGNIFICANT CHANGE UP (ref 3.5–5.3)
POTASSIUM SERPL-SCNC: 4 MMOL/L — SIGNIFICANT CHANGE UP (ref 3.5–5.3)
PROT SERPL-MCNC: 7.8 G/DL — SIGNIFICANT CHANGE UP (ref 6.6–8.7)
RBC # BLD: 4.92 M/UL — SIGNIFICANT CHANGE UP (ref 3.8–5.2)
RBC # FLD: 13.1 % — SIGNIFICANT CHANGE UP (ref 10.3–14.5)
RSV RNA NPH QL NAA+NON-PROBE: SIGNIFICANT CHANGE UP
SAO2 % BLDV: 99.2 % — SIGNIFICANT CHANGE UP
SARS-COV-2 RNA SPEC QL NAA+PROBE: SIGNIFICANT CHANGE UP
SODIUM SERPL-SCNC: 135 MMOL/L — SIGNIFICANT CHANGE UP (ref 135–145)
TROPONIN T SERPL-MCNC: <0.01 NG/ML — SIGNIFICANT CHANGE UP (ref 0–0.06)
TROPONIN T SERPL-MCNC: <0.01 NG/ML — SIGNIFICANT CHANGE UP (ref 0–0.06)
WBC # BLD: 7.79 K/UL — SIGNIFICANT CHANGE UP (ref 3.8–10.5)
WBC # FLD AUTO: 7.79 K/UL — SIGNIFICANT CHANGE UP (ref 3.8–10.5)

## 2022-01-26 PROCEDURE — 84132 ASSAY OF SERUM POTASSIUM: CPT

## 2022-01-26 PROCEDURE — 96374 THER/PROPH/DIAG INJ IV PUSH: CPT

## 2022-01-26 PROCEDURE — 96375 TX/PRO/DX INJ NEW DRUG ADDON: CPT

## 2022-01-26 PROCEDURE — 82435 ASSAY OF BLOOD CHLORIDE: CPT

## 2022-01-26 PROCEDURE — 99285 EMERGENCY DEPT VISIT HI MDM: CPT | Mod: 25

## 2022-01-26 PROCEDURE — 85014 HEMATOCRIT: CPT

## 2022-01-26 PROCEDURE — 80053 COMPREHEN METABOLIC PANEL: CPT

## 2022-01-26 PROCEDURE — 87637 SARSCOV2&INF A&B&RSV AMP PRB: CPT

## 2022-01-26 PROCEDURE — T1013: CPT

## 2022-01-26 PROCEDURE — 93005 ELECTROCARDIOGRAM TRACING: CPT

## 2022-01-26 PROCEDURE — 71045 X-RAY EXAM CHEST 1 VIEW: CPT | Mod: 26

## 2022-01-26 PROCEDURE — 83690 ASSAY OF LIPASE: CPT

## 2022-01-26 PROCEDURE — 82947 ASSAY GLUCOSE BLOOD QUANT: CPT

## 2022-01-26 PROCEDURE — 86703 HIV-1/HIV-2 1 RESULT ANTBDY: CPT

## 2022-01-26 PROCEDURE — 83605 ASSAY OF LACTIC ACID: CPT

## 2022-01-26 PROCEDURE — 93010 ELECTROCARDIOGRAM REPORT: CPT

## 2022-01-26 PROCEDURE — 36415 COLL VENOUS BLD VENIPUNCTURE: CPT

## 2022-01-26 PROCEDURE — 84484 ASSAY OF TROPONIN QUANT: CPT

## 2022-01-26 PROCEDURE — 99285 EMERGENCY DEPT VISIT HI MDM: CPT

## 2022-01-26 PROCEDURE — 82803 BLOOD GASES ANY COMBINATION: CPT

## 2022-01-26 PROCEDURE — 85025 COMPLETE CBC W/AUTO DIFF WBC: CPT

## 2022-01-26 PROCEDURE — 84295 ASSAY OF SERUM SODIUM: CPT

## 2022-01-26 PROCEDURE — 82330 ASSAY OF CALCIUM: CPT

## 2022-01-26 PROCEDURE — 85018 HEMOGLOBIN: CPT

## 2022-01-26 PROCEDURE — 71045 X-RAY EXAM CHEST 1 VIEW: CPT

## 2022-01-26 RX ORDER — LIDOCAINE 4 G/100G
10 CREAM TOPICAL ONCE
Refills: 0 | Status: COMPLETED | OUTPATIENT
Start: 2022-01-26 | End: 2022-01-26

## 2022-01-26 RX ORDER — FAMOTIDINE 10 MG/ML
20 INJECTION INTRAVENOUS ONCE
Refills: 0 | Status: COMPLETED | OUTPATIENT
Start: 2022-01-26 | End: 2022-01-26

## 2022-01-26 RX ORDER — KETOROLAC TROMETHAMINE 30 MG/ML
15 SYRINGE (ML) INJECTION ONCE
Refills: 0 | Status: DISCONTINUED | OUTPATIENT
Start: 2022-01-26 | End: 2022-01-26

## 2022-01-26 RX ORDER — AMLODIPINE BESYLATE 2.5 MG/1
10 TABLET ORAL ONCE
Refills: 0 | Status: COMPLETED | OUTPATIENT
Start: 2022-01-26 | End: 2022-01-26

## 2022-01-26 RX ADMIN — AMLODIPINE BESYLATE 10 MILLIGRAM(S): 2.5 TABLET ORAL at 06:03

## 2022-01-26 RX ADMIN — Medication 15 MILLIGRAM(S): at 04:29

## 2022-01-26 RX ADMIN — Medication 0.1 MILLIGRAM(S): at 06:03

## 2022-01-26 RX ADMIN — Medication 30 MILLILITER(S): at 04:30

## 2022-01-26 RX ADMIN — LIDOCAINE 10 MILLILITER(S): 4 CREAM TOPICAL at 04:30

## 2022-01-26 RX ADMIN — FAMOTIDINE 20 MILLIGRAM(S): 10 INJECTION INTRAVENOUS at 04:30

## 2022-01-26 NOTE — ED PROVIDER NOTE - NSPTACCESSSVCSAPPTDETAILS_ED_ALL_ED_FT
Patient has been having difficulty making appointment for [facility] primary care and cardiology.  Please assist. thank you.

## 2022-01-26 NOTE — ED ADULT NURSE REASSESSMENT NOTE - NS ED NURSE REASSESS COMMENT FT1
assumed care of pt at this time  pt in pEDS 1  alert and oriented Somali speaking...vitals WNL     bedside card monitor in use, BP improving   IVSL  20g RAC site clean/dry   CLARK=strength.  waiting dispo...

## 2022-01-26 NOTE — ED ADULT TRIAGE NOTE - CHIEF COMPLAINT QUOTE
patient from home with complaints of chest discomfort, back pain and chills. patient states that her  was diagnosed with COVID recently

## 2022-01-26 NOTE — ED PROVIDER NOTE - PATIENT PORTAL LINK FT
You can access the FollowMyHealth Patient Portal offered by [facility] by registering at the following website: [email or web address]. By joining [facility]’s FollowMyHealth portal, you will also be able to view your health information using other applications (apps) compatible with our system.

## 2022-01-26 NOTE — ED PROVIDER NOTE - NSFOLLOWUPCLINICS_GEN_ALL_ED_FT
[address] [address]  Phone: [phone]  Fax:     [facility]  Cardiology  [address] [facility] [facility] [address]  [place] [address]  Phone: [phone]  Fax:

## 2022-01-26 NOTE — ED ADULT NURSE NOTE - OBJECTIVE STATEMENT
Assumed care of the Pt AOx4 in no acute distress. Pt states she feels pretty well but has been having some ABD pain and chest pain that started around 7pm. Pt BP elevated MD aware Pt Hx of HTN. Pt placed on CM and continuos O2 monitor VSS at this time. Pt Denies any shortness of breath, nausea, vomiting or fevers at this time. Pt breathing even and unlabored. Meds given as per orders, labs sent as per orders pt educated on plan of care, pt able to successfully teach back plan of care to RN, RN will continue to reeducate pt during hospital stay.

## 2022-01-26 NOTE — ED PROVIDER NOTE - ENMT, MLM
Airway patent, Nasal mucosa clear. Mouth with normal mucosa. Throat has no vesicles, no oropharyngeal exudates and uvula is midline. Dentures in place

## 2022-01-26 NOTE — ED PROVIDER NOTE - NSFOLLOWUPINSTRUCTIONS_ED_ALL_ED_FT
You are advised to please follow up with your primary care doctor within the next 24 hours and return to the Emergency Department for worsening symptoms or any other concerns.  Your doctor may call [phone] to follow up on the specific results of the tests performed today in the emergency department.      Gastritis    LO QUE NECESITA SABER:    La gastritis es vasu inflamación o irritación del revestimiento del estómago.     Órganos abdominales         INSTRUCCIONES SOBRE [facility]:    Llame al 911 en arthur de presentar lo siguiente:  •Tiene dolor de pecho o le falta el aire.          Regrese a la jacobo de emergencias si:  •Usted vomita edith.      •Usted tiene evacuaciones intestinales negras o con edith.      •Usted tiene un mayur dolor de estómago o de espalda.      Comuníquese con boogie médico si:  •Tiene fiebre.      •Usted tiene síntomas nuevos o estos empeoran, aun después del tratamiento.      •Usted tiene preguntas o inquietudes acerca de boogie condición o cuidado.      Medicamentos:  •Los medicamentospara ayudar a tratar la infección bacteriana o para disminuir el ácido estomacal.      •[place] bubba medicamentos araceli se le haya indicado.Consulte con boogie médico si usted emelyn que boogie medicamento no le está ayudando o si presenta efectos secundarios. Infórmele si es alérgico a cualquier medicamento. Mantenga vasu lista actualizada de los medicamentos, las vitaminas y los productos herbales que duran. Incluya los siguientes datos de los medicamentos: cantidad, frecuencia y motivo de administración. Traiga con usted la lista o los envases de las píldoras a bubba citas de seguimiento. Lleve la lista de los medicamentos con usted en arthur de vasu emergencia.      Maneje o evite la gastritis:  •No fume.La nicotina y otras sustancias químicas de los cigarrillos y los cigarros pueden empeorar bubba síntomas y causar daño pulmonar. Pida información a boogie médico si usted actualmente fuma y necesita ayuda para dejar de fumar. Los cigarrillos electrónicos o el tabaco sin humo igualmente contienen nicotina. Consulte con boogie médico antes de utilizar estos productos.      •No consuma alcohol.El alcohol puede evitar la cicatrización y empeorar la gastritis. Consulte con boogie médico si usted necesita ayuda para dejar de talia alcohol.      •No tome medicamentos DOMINIC o aspirina a menos que así se lo indiquen.Estos analgésicos y medicamentos similares pueden causar irritación. Si boogie médico lo autoriza a talia medicamentos DOMINIC, tómelos con la comida.      •No coma alimentos que le provocan irritación:Los alimentos araceli las naranjas y la salsa pueden causar ardor o dolor. Consuma alimentos saludables y variados. Unos ejemplos incluyen las frutas (no las cítricas), verduras, productos lácteos descremados, legumbres, pan integral al igual que las lilian magras y pescado. Trate de comer porciones más pequeñas y talia agua con bubba comidas. No coma nada al menos por 3 horas antes de acostarse.      •Encuentre maneras de relajarse y reducir el estrés.El estrés puede aumentar el ácido estomacal y empeorar la gastritis. Las actividades araceli el yoga, la meditación o el escuchar música pueden ayudarlo a relajarse. Pase tiempo con amigos, o carline cosas que disfruta.    ____________________________________      Hipertensión en los adultos    Hypertension, Adult      El término hipertensión es otra forma de denominar a la presión arterial elevada. La presión arterial elevada fuerza al corazón a trabajar más para bombear la edith. [place] puede causar problemas con el paso del tiempo.    Vasu lectura de presión arterial está compuesta por 2 números. Hay un número superior (sistólico) sobre un número inferior (diastólico). Lo ideal es tener la presión arterial por debajo de 120/80. Las elecciones saludables pueden ayudar a bajar la presión arterial, o ming vez necesite medicamentos para bajarla.      ¿Cuáles son las causas?    Se desconoce la causa de esta afección. Algunas afecciones pueden estar relacionadas con la presión arterial palmira.      ¿Qué incrementa el riesgo?    •Fumar.      •Tener diabetes mellitus tipo 2, colesterol alto, o ambos.      •No hacer la cantidad suficiente de actividad física o ejercicio.      •Tener sobrepeso.      •Consumir mucha grasa, azúcar, calorías o sal (sodio) en boogie dieta.      •Beber alcohol en exceso.      •Tener vasu enfermedad renal a steven plazo (crónica).      •Tener antecedentes familiares de presión arterial palmira.      •Edad. Los riesgos aumentan con la edad.      •Elias. El riesgo es mayor para las personas afroamericanas.      •Sexo. Antes de los 45 años, los hombres corren más riesgo que las mujeres. Después de los 65 años, las mujeres corren más riesgo que los hombres.      •Tener apnea obstructiva del sueño.      •Estrés.        ¿Cuáles son los signos o los síntomas?  •Es posible que la presión arterial palmira puede no cause síntomas. La presión arterial muy palmira (crisis hipertensiva) puede provocar:  •Dolor de pete.      •Sensaciones de preocupación o nerviosismo (ansiedad).      •Falta de aire.      •Hemorragia nasal.      •Sensación de malestar en el estómago (náuseas).      •Vómitos.      •Cambios en la forma de rahul.      •Dolor muy intenso en el pecho.      •Convulsiones.          ¿Cómo se trata?  •Esta afección se trata haciendo cambios saludables en el estilo de marija, por ejemplo:  •Consumir alimentos saludables.      •Hacer más ejercicio.      •Beber menos alcohol.      •El médico puede recetarle medicamentos si los cambios en el estilo de marija no son suficientes para lograr controlar la presión arterial y si:  •El número de arriba está por encima de 130.      •El número de abajo está por encima de 80.        •Boogie presión arterial personal ideal puede variar.        Siga estas instrucciones en boogie casa:      Comida y bebida    •Si se lo dicen, siga el plan de alimentación de DASH (Dietary Approaches to Stop Hypertension, Maneras de alimentarse para detener la hipertensión). Para seguir cinthya plan:  •Llene la mitad del plato de cada comida con frutas y verduras.      •Llene un cuarto del plato de cada comida con cereales integrales. Los cereales integrales incluyen pasta integral, arroz integral y pan integral.      •Coma y vibha productos lácteos con bajo contenido de grasa, araceli leche descremada o yogur bajo en grasas.      •Llene un cuarto del plato de cada comida con proteínas bajas en grasa (magras). Las proteínas bajas en grasa incluyen pescado, gaviota sin piel, huevos, frijoles y tofu.      •Evite consumir carne grasa, carne curada y procesada, o gaviota con piel.      •Evite consumir alimentos prehechos o procesados.        •Consuma menos de 1500 mg de sal por día.    • No vibha alcohol si:  •El médico le indica que no lo carline.      •Está embarazada, puede estar embarazada o está tratando de quedar embarazada.      •Si cole alcohol:•Limite la cantidad que cole a lo siguiente:  •De 0 a 1 medida por día para las mujeres.      •De 0 a 2 medidas por día para los hombres.        •Esté atento a la cantidad de alcohol que hay en las bebidas que duran. En los [place], vasu medida equivale a vasu botella de cerveza de 12 oz (355 ml), un vaso de vino de 5 oz (148 ml) o un vaso de vasu bebida alcohólica de palmira graduación de 1½ oz (44 ml).          Estilo de marija      •Trabaje con boogie médico para mantenerse en un peso saludable o para perder peso. Pregúntele a boogie médico cuál es el peso recomendable para usted.      •Carline al menos 30 minutos de ejercicio la mayoría de los días de la semana. Estos pueden incluir caminar, nadar o andar en bicicleta.      •Realice al menos 30 minutos de ejercicio que fortalezca bubba músculos (ejercicios de resistencia) al menos 3 días a la semana. Estos pueden incluir levantar pesas o hacer [place].      • No consuma ningún producto que contenga nicotina o tabaco, araceli cigarrillos, cigarrillos electrónicos y tabaco de mascar. Si necesita ayuda para dejar de fumar, consulte al médico.      •Controle boogie presión arterial en boogie casa ming araceli le indicó el médico.      •Concurra a todas las visitas de seguimiento araceli se lo haya indicado el médico. [place] es importante.      Medicamentos     •[place] los medicamentos de venta jose y los recetados solamente araceli se lo haya indicado el médico. Siga cuidadosamente las indicaciones.      • No omita las dosis de medicamentos para la presión arterial. Los medicamentos pierden eficacia si omite dosis. El hecho de omitir las dosis también aumenta el riesgo de otros problemas.      •Pregúntele a boogie médico a qué efectos secundarios o reacciones a los medicamentos debe prestar atención.        Comuníquese con un médico si:    •Piensa que tiene vasu reacción a los medicamentos que está tomando.      •Tiene ravi de pete frecuentes (recurrentes).      •Se siente mareado.      •Tiene hinchazón en los tobillos.      •Tiene problemas de visión.        Solicite ayuda inmediatamente si:    •Siente un dolor de pete muy intenso.      •Empieza a sentirse desorientado (confundido).      •Se siente débil o adormecido.      •Siente que va a desmayarse.    •Tiene un dolor muy intenso en las siguientes zonas:  •Pecho.      •Vientre (abdomen).        •Vomita más de vasu vez.      •Tiene dificultad para respirar.        Resumen    •El término hipertensión es otra forma de denominar a la presión arterial elevada.      •La presión arterial elevada fuerza al corazón a trabajar más para bombear la edith.      •Para la mayoría de las personas, vasu presión arterial normal es deepak que 120/80.      •Las decisiones saludables pueden ayudarle a disminuir boogie presión arterial. Si no puede bajar boogie presión arterial mediante decisiones saludables, es posible que deba talia medicamentos.

## 2022-01-26 NOTE — ED PROVIDER NOTE - PROGRESS NOTE DETAILS
Patient re-evaluated and states pain resolved after medication. She currently is resting comfortably. Repeat BP is noted to be 216/100. Patient now endorses s that she has not been taking her medication ( metoprolol, spirolactone, metformin) the past three days because it has been causing her epigastric discomfort when she does.    : Koko received signout pending repeat enzymes.  will dc with f/up with pmd and cardiology.  will send referral to [facility] to assist to make appointments.

## 2022-01-26 NOTE — ED ADULT NURSE REASSESSMENT NOTE - NS ED NURSE REASSESS COMMENT FT1
MD made aware of Pt BP meds to be given as per orders pt educated on plan of care, pt able to successfully teach back plan of care to RN, RN will continue to reeducate pt during hospital stay.

## 2022-01-26 NOTE — ED PROVIDER NOTE - OBJECTIVE STATEMENT
61 yo female with PMHx of HTN, cholesterol, DM, and CAD presents for evaluation of pain. Patient states she has been generally well. However, starting acute chest /back pain. Patient states around 7 pm last night she develop acute lower epigastric/distal sternal pain, upper back pain (t1 region), and sore taste in mouth. She states symptoms have been persistent. She denies any change in her symptoms with change in position or exertion. She has not taken anything to help with her symptoms. Patient denies fever, chills, coughing, nausea, vomiting, other body pains, headache, neck pain or dyspnea. patient endorses that her  had covid last week and was admitted to [facility] this past monday due to his Covid symptoms.

## 2022-09-12 ENCOUNTER — APPOINTMENT (OUTPATIENT)
Dept: CARDIOLOGY | Facility: CLINIC | Age: 62
End: 2022-09-12

## 2022-09-12 ENCOUNTER — NON-APPOINTMENT (OUTPATIENT)
Age: 62
End: 2022-09-12

## 2022-09-12 VITALS
DIASTOLIC BLOOD PRESSURE: 92 MMHG | HEART RATE: 73 BPM | OXYGEN SATURATION: 99 % | WEIGHT: 196 LBS | SYSTOLIC BLOOD PRESSURE: 130 MMHG | BODY MASS INDEX: 37 KG/M2 | TEMPERATURE: 99.6 F | HEIGHT: 61 IN

## 2022-09-12 PROCEDURE — 93000 ELECTROCARDIOGRAM COMPLETE: CPT

## 2022-09-12 PROCEDURE — 99214 OFFICE O/P EST MOD 30 MIN: CPT | Mod: 25

## 2022-09-12 NOTE — HISTORY OF PRESENT ILLNESS
[FreeTextEntry1] : [employer]\par ID [id number]\par \par HTN\par COntrolled.\par Compliant,  with, medication and diet.\par Denied dyspnea orthopnea, pnd, edema, cp, palpation, syncope ea syncope. \par \par \par CAD\par Stable on med\par Denied anginal symptoms\par Denied sings or symptoms of hear failure\par \par \par FUNCTIONAL CAPACITY\par Estimated <4.0 METS\par \par CHRONIC, STABLE CONDITIONS\par Abnormal chest CT scan. :5 mm right middle lobe subpleural nodule. \par KIRAN\par DM\par \par CARDIAC TESTING\par 1) CTA Chest 6/16/2020: Coronary calcification. No PE.\par 2) Echo 6/2020: TLS. Trivial pericardial effusion.\par 3) C 6/18/2020 due to abnormal stress test: \par LAD: prox 80, mid 40, and there is  100%  stenosis in the distal third segment of the vessel, D1 75\par Cx: OM1 90\par RCA: RPDA 50, RPLS 70\par Impression: Multivessel CAD (prox LAD 80, mid , distal OM 90, RPLA of RCA 70\par Intervention: JONATHAN to proximal  LAD. Unsuccessful attempt to cross the mid-distal LAD  (unable to verify wire position in true lumen). PCI and JONATHAN of proximal LAD done.\par

## 2022-09-12 NOTE — REASON FOR VISIT
[Symptom and Test Evaluation] : symptom and test evaluation [Hypertension] : hypertension [Initial Evaluation] : an initial evaluation of [FreeTextEntry1] : HTN

## 2022-09-12 NOTE — CARDIOLOGY SUMMARY
[___] : [unfilled] [de-identified] : 9/12/2022\par Sinus  Rhythm \par Voltage criteria for LVH  (R(I)+S(III) exceeds 2.50 mV)  -Voltage criteria w/o ST/T abnormality may be normal. \par  -Nonspecific ST depression   +   Negative T-waves  -Nondiagnostic  -Possible  Lateral  ischemia. \par ABNORMAL \par

## 2022-09-12 NOTE — PHYSICAL EXAM
[General Appearance - Well Developed] : well developed [Normal Appearance] : normal appearance [Well Groomed] : well groomed [General Appearance - Well Nourished] : well nourished [No Deformities] : no deformities [General Appearance - In No Acute Distress] : no acute distress [Eyelids - No Xanthelasma] : the eyelids demonstrated no xanthelasmas [Normal Oral Mucosa] : normal oral mucosa [No Oral Pallor] : no oral pallor [No Oral Cyanosis] : no oral cyanosis [Normal Jugular Venous A Waves Present] : normal jugular venous A waves present [Normal Jugular Venous V Waves Present] : normal jugular venous V waves present [No Jugular Venous Hernandez A Waves] : no jugular venous hernandez A waves [Exaggerated Use Of Accessory Muscles For Inspiration] : no accessory muscle use [Auscultation Breath Sounds / Voice Sounds] : lungs were clear to auscultation bilaterally [Heart Rate And Rhythm] : heart rate and rhythm were normal [Heart Sounds] : normal S1 and S2 [Murmurs] : no murmurs present [Abdomen Soft] : soft [Abdomen Tenderness] : non-tender [Abdomen Mass (___ Cm)] : no abdominal mass palpated [Abnormal Walk] : normal gait [Gait - Sufficient For Exercise Testing] : the gait was sufficient for exercise testing [Nail Clubbing] : no clubbing of the fingernails [Cyanosis, Localized] : no localized cyanosis [Petechial Hemorrhages (___cm)] : no petechial hemorrhages [Skin Color & Pigmentation] : normal skin color and pigmentation [] : no rash [No Venous Stasis] : no venous stasis [Skin Lesions] : no skin lesions [No Skin Ulcers] : no skin ulcer [No Xanthoma] : no  xanthoma was observed [Oriented To Time, Place, And Person] : oriented to person, place, and time [Affect] : the affect was normal [Mood] : the mood was normal [No Anxiety] : not feeling anxious [Well Developed] : well developed [Well Nourished] : well nourished [No Acute Distress] : no acute distress [Normal Conjunctiva] : normal conjunctiva [Normal Venous Pressure] : normal venous pressure [No Carotid Bruit] : no carotid bruit [Normal S1, S2] : normal S1, S2 [No Murmur] : no murmur [No Rub] : no rub [No Gallop] : no gallop [Clear Lung Fields] : clear lung fields [Good Air Entry] : good air entry [No Respiratory Distress] : no respiratory distress  [Soft] : abdomen soft [Non Tender] : non-tender [No Masses/organomegaly] : no masses/organomegaly [Normal Bowel Sounds] : normal bowel sounds [Normal Gait] : normal gait [No Edema] : no edema [No Cyanosis] : no cyanosis [No Clubbing] : no clubbing [No Varicosities] : no varicosities [No Rash] : no rash [No Skin Lesions] : no skin lesions [Moves all extremities] : moves all extremities [No Focal Deficits] : no focal deficits [Normal Speech] : normal speech [Alert and Oriented] : alert and oriented [Normal memory] : normal memory

## 2022-12-05 ENCOUNTER — APPOINTMENT (OUTPATIENT)
Dept: CARDIOLOGY | Facility: CLINIC | Age: 62
End: 2022-12-05

## 2022-12-13 ENCOUNTER — APPOINTMENT (OUTPATIENT)
Dept: CARDIOLOGY | Facility: CLINIC | Age: 62
End: 2022-12-13

## 2022-12-13 ENCOUNTER — NON-APPOINTMENT (OUTPATIENT)
Age: 62
End: 2022-12-13

## 2022-12-13 VITALS
OXYGEN SATURATION: 99 % | BODY MASS INDEX: 36.63 KG/M2 | HEART RATE: 73 BPM | SYSTOLIC BLOOD PRESSURE: 124 MMHG | HEIGHT: 61 IN | DIASTOLIC BLOOD PRESSURE: 72 MMHG | WEIGHT: 194 LBS | TEMPERATURE: 98.4 F

## 2022-12-13 DIAGNOSIS — R42 DIZZINESS AND GIDDINESS: ICD-10-CM

## 2022-12-13 PROCEDURE — 93000 ELECTROCARDIOGRAM COMPLETE: CPT

## 2022-12-13 PROCEDURE — 99215 OFFICE O/P EST HI 40 MIN: CPT | Mod: 25

## 2022-12-21 ENCOUNTER — EMERGENCY (EMERGENCY)
Facility: HOSPITAL | Age: 62
LOS: 1 days | Discharge: HOME | End: 2022-12-21
Attending: EMERGENCY MEDICINE
Payer: MEDICAID

## 2022-12-21 VITALS
TEMPERATURE: 98 F | RESPIRATION RATE: 18 BRPM | OXYGEN SATURATION: 98 % | DIASTOLIC BLOOD PRESSURE: 70 MMHG | HEART RATE: 64 BPM | SYSTOLIC BLOOD PRESSURE: 155 MMHG

## 2022-12-21 VITALS
SYSTOLIC BLOOD PRESSURE: 219 MMHG | HEART RATE: 93 BPM | DIASTOLIC BLOOD PRESSURE: 123 MMHG | RESPIRATION RATE: 16 BRPM | WEIGHT: 164.91 LBS | OXYGEN SATURATION: 99 % | TEMPERATURE: 100 F

## 2022-12-21 DIAGNOSIS — U07.1 COVID-19: ICD-10-CM

## 2022-12-21 DIAGNOSIS — I16.0 HYPERTENSIVE URGENCY: ICD-10-CM

## 2022-12-21 DIAGNOSIS — I25.10 ATHEROSCLEROTIC HEART DISEASE OF NATIVE CORONARY ARTERY WITHOUT ANGINA PECTORIS: ICD-10-CM

## 2022-12-21 DIAGNOSIS — R07.9 CHEST PAIN, UNSPECIFIED: ICD-10-CM

## 2022-12-21 LAB
ALBUMIN SERPL ELPH-MCNC: 3.9 G/DL — SIGNIFICANT CHANGE UP (ref 3.3–5.2)
ALP SERPL-CCNC: 111 U/L — SIGNIFICANT CHANGE UP (ref 40–120)
ALT FLD-CCNC: 20 U/L — SIGNIFICANT CHANGE UP
ANION GAP SERPL CALC-SCNC: 12 MMOL/L — SIGNIFICANT CHANGE UP (ref 5–17)
APTT BLD: 27.6 SEC — SIGNIFICANT CHANGE UP (ref 27.5–35.5)
AST SERPL-CCNC: 36 U/L — HIGH
BASOPHILS # BLD AUTO: 0.03 K/UL — SIGNIFICANT CHANGE UP (ref 0–0.2)
BASOPHILS NFR BLD AUTO: 0.4 % — SIGNIFICANT CHANGE UP (ref 0–2)
BILIRUB SERPL-MCNC: 0.7 MG/DL — SIGNIFICANT CHANGE UP (ref 0.4–2)
BUN SERPL-MCNC: 13 MG/DL — SIGNIFICANT CHANGE UP (ref 8–20)
CALCIUM SERPL-MCNC: 9.1 MG/DL — SIGNIFICANT CHANGE UP (ref 8.4–10.5)
CHLORIDE SERPL-SCNC: 100 MMOL/L — SIGNIFICANT CHANGE UP (ref 96–108)
CK MB CFR SERPL CALC: 2.5 NG/ML — SIGNIFICANT CHANGE UP (ref 0–6.7)
CK SERPL-CCNC: 101 U/L — SIGNIFICANT CHANGE UP (ref 25–170)
CK SERPL-CCNC: 156 U/L — SIGNIFICANT CHANGE UP (ref 25–170)
CO2 SERPL-SCNC: 23 MMOL/L — SIGNIFICANT CHANGE UP (ref 22–29)
CREAT SERPL-MCNC: 0.77 MG/DL — SIGNIFICANT CHANGE UP (ref 0.5–1.3)
D DIMER BLD IA.RAPID-MCNC: 282 NG/ML DDU — HIGH
EGFR: 86 ML/MIN/1.73M2 — SIGNIFICANT CHANGE UP
EOSINOPHIL # BLD AUTO: 0.03 K/UL — SIGNIFICANT CHANGE UP (ref 0–0.5)
EOSINOPHIL NFR BLD AUTO: 0.4 % — SIGNIFICANT CHANGE UP (ref 0–6)
FLUAV AG NPH QL: SIGNIFICANT CHANGE UP
FLUBV AG NPH QL: SIGNIFICANT CHANGE UP
GLUCOSE SERPL-MCNC: 195 MG/DL — HIGH (ref 70–99)
HCT VFR BLD CALC: 40.6 % — SIGNIFICANT CHANGE UP (ref 34.5–45)
HGB BLD-MCNC: 13.3 G/DL — SIGNIFICANT CHANGE UP (ref 11.5–15.5)
IMM GRANULOCYTES NFR BLD AUTO: 0.3 % — SIGNIFICANT CHANGE UP (ref 0–0.9)
INR BLD: 1.09 RATIO — SIGNIFICANT CHANGE UP (ref 0.88–1.16)
LYMPHOCYTES # BLD AUTO: 1.72 K/UL — SIGNIFICANT CHANGE UP (ref 1–3.3)
LYMPHOCYTES # BLD AUTO: 23 % — SIGNIFICANT CHANGE UP (ref 13–44)
MCHC RBC-ENTMCNC: 26.5 PG — LOW (ref 27–34)
MCHC RBC-ENTMCNC: 32.8 GM/DL — SIGNIFICANT CHANGE UP (ref 32–36)
MCV RBC AUTO: 81 FL — SIGNIFICANT CHANGE UP (ref 80–100)
MONOCYTES # BLD AUTO: 0.62 K/UL — SIGNIFICANT CHANGE UP (ref 0–0.9)
MONOCYTES NFR BLD AUTO: 8.3 % — SIGNIFICANT CHANGE UP (ref 2–14)
NEUTROPHILS # BLD AUTO: 5.07 K/UL — SIGNIFICANT CHANGE UP (ref 1.8–7.4)
NEUTROPHILS NFR BLD AUTO: 67.6 % — SIGNIFICANT CHANGE UP (ref 43–77)
PLATELET # BLD AUTO: 240 K/UL — SIGNIFICANT CHANGE UP (ref 150–400)
POTASSIUM SERPL-MCNC: 4.8 MMOL/L — SIGNIFICANT CHANGE UP (ref 3.5–5.3)
POTASSIUM SERPL-SCNC: 4.8 MMOL/L — SIGNIFICANT CHANGE UP (ref 3.5–5.3)
PROT SERPL-MCNC: 7.9 G/DL — SIGNIFICANT CHANGE UP (ref 6.6–8.7)
PROTHROM AB SERPL-ACNC: 12.6 SEC — SIGNIFICANT CHANGE UP (ref 10.5–13.4)
RBC # BLD: 5.01 M/UL — SIGNIFICANT CHANGE UP (ref 3.8–5.2)
RBC # FLD: 14.2 % — SIGNIFICANT CHANGE UP (ref 10.3–14.5)
RSV RNA NPH QL NAA+NON-PROBE: SIGNIFICANT CHANGE UP
SARS-COV-2 RNA SPEC QL NAA+PROBE: DETECTED
SODIUM SERPL-SCNC: 135 MMOL/L — SIGNIFICANT CHANGE UP (ref 135–145)
TROPONIN T SERPL-MCNC: <0.01 NG/ML — SIGNIFICANT CHANGE UP (ref 0–0.06)
TROPONIN T SERPL-MCNC: <0.01 NG/ML — SIGNIFICANT CHANGE UP (ref 0–0.06)
WBC # BLD: 7.49 K/UL — SIGNIFICANT CHANGE UP (ref 3.8–10.5)
WBC # FLD AUTO: 7.49 K/UL — SIGNIFICANT CHANGE UP (ref 3.8–10.5)

## 2022-12-21 PROCEDURE — 84484 ASSAY OF TROPONIN QUANT: CPT

## 2022-12-21 PROCEDURE — 85025 COMPLETE CBC W/AUTO DIFF WBC: CPT

## 2022-12-21 PROCEDURE — C8929: CPT

## 2022-12-21 PROCEDURE — 96375 TX/PRO/DX INJ NEW DRUG ADDON: CPT | Mod: XU

## 2022-12-21 PROCEDURE — 93010 ELECTROCARDIOGRAM REPORT: CPT

## 2022-12-21 PROCEDURE — 82553 CREATINE MB FRACTION: CPT

## 2022-12-21 PROCEDURE — 87637 SARSCOV2&INF A&B&RSV AMP PRB: CPT

## 2022-12-21 PROCEDURE — G0378: CPT

## 2022-12-21 PROCEDURE — 85730 THROMBOPLASTIN TIME PARTIAL: CPT

## 2022-12-21 PROCEDURE — 71045 X-RAY EXAM CHEST 1 VIEW: CPT | Mod: 26

## 2022-12-21 PROCEDURE — 99234 HOSP IP/OBS SM DT SF/LOW 45: CPT

## 2022-12-21 PROCEDURE — 93005 ELECTROCARDIOGRAM TRACING: CPT

## 2022-12-21 PROCEDURE — 36415 COLL VENOUS BLD VENIPUNCTURE: CPT

## 2022-12-21 PROCEDURE — 99284 EMERGENCY DEPT VISIT MOD MDM: CPT

## 2022-12-21 PROCEDURE — 96374 THER/PROPH/DIAG INJ IV PUSH: CPT | Mod: XU

## 2022-12-21 PROCEDURE — 85610 PROTHROMBIN TIME: CPT

## 2022-12-21 PROCEDURE — 99285 EMERGENCY DEPT VISIT HI MDM: CPT | Mod: 25

## 2022-12-21 PROCEDURE — 85379 FIBRIN DEGRADATION QUANT: CPT

## 2022-12-21 PROCEDURE — 80053 COMPREHEN METABOLIC PANEL: CPT

## 2022-12-21 PROCEDURE — 82550 ASSAY OF CK (CPK): CPT

## 2022-12-21 PROCEDURE — 71045 X-RAY EXAM CHEST 1 VIEW: CPT

## 2022-12-21 RX ORDER — INSULIN LISPRO 100/ML
VIAL (ML) SUBCUTANEOUS
Refills: 0 | Status: DISCONTINUED | OUTPATIENT
Start: 2022-12-21 | End: 2022-12-28

## 2022-12-21 RX ORDER — DEXTROSE 50 % IN WATER 50 %
15 SYRINGE (ML) INTRAVENOUS ONCE
Refills: 0 | Status: DISCONTINUED | OUTPATIENT
Start: 2022-12-21 | End: 2022-12-28

## 2022-12-21 RX ORDER — METOPROLOL TARTRATE 50 MG
100 TABLET ORAL DAILY
Refills: 0 | Status: DISCONTINUED | OUTPATIENT
Start: 2022-12-21 | End: 2022-12-28

## 2022-12-21 RX ORDER — KETOROLAC TROMETHAMINE 30 MG/ML
15 SYRINGE (ML) INJECTION ONCE
Refills: 0 | Status: DISCONTINUED | OUTPATIENT
Start: 2022-12-21 | End: 2022-12-21

## 2022-12-21 RX ORDER — ATORVASTATIN CALCIUM 80 MG/1
40 TABLET, FILM COATED ORAL AT BEDTIME
Refills: 0 | Status: DISCONTINUED | OUTPATIENT
Start: 2022-12-21 | End: 2022-12-28

## 2022-12-21 RX ORDER — DEXTROSE 50 % IN WATER 50 %
25 SYRINGE (ML) INTRAVENOUS ONCE
Refills: 0 | Status: DISCONTINUED | OUTPATIENT
Start: 2022-12-21 | End: 2022-12-28

## 2022-12-21 RX ORDER — HYDRALAZINE HCL 50 MG
25 TABLET ORAL ONCE
Refills: 0 | Status: COMPLETED | OUTPATIENT
Start: 2022-12-21 | End: 2022-12-21

## 2022-12-21 RX ORDER — ACETAMINOPHEN 500 MG
650 TABLET ORAL EVERY 6 HOURS
Refills: 0 | Status: DISCONTINUED | OUTPATIENT
Start: 2022-12-21 | End: 2022-12-28

## 2022-12-21 RX ORDER — AMLODIPINE BESYLATE 2.5 MG/1
10 TABLET ORAL DAILY
Refills: 0 | Status: DISCONTINUED | OUTPATIENT
Start: 2022-12-21 | End: 2022-12-28

## 2022-12-21 RX ORDER — SODIUM CHLORIDE 9 MG/ML
3 INJECTION INTRAMUSCULAR; INTRAVENOUS; SUBCUTANEOUS ONCE
Refills: 0 | Status: COMPLETED | OUTPATIENT
Start: 2022-12-21 | End: 2022-12-21

## 2022-12-21 RX ORDER — HYDRALAZINE HCL 50 MG
10 TABLET ORAL ONCE
Refills: 0 | Status: COMPLETED | OUTPATIENT
Start: 2022-12-21 | End: 2022-12-21

## 2022-12-21 RX ORDER — LABETALOL HCL 100 MG
2 TABLET ORAL
Qty: 120 | Refills: 0
Start: 2022-12-21

## 2022-12-21 RX ORDER — GLUCAGON INJECTION, SOLUTION 0.5 MG/.1ML
1 INJECTION, SOLUTION SUBCUTANEOUS ONCE
Refills: 0 | Status: DISCONTINUED | OUTPATIENT
Start: 2022-12-21 | End: 2022-12-28

## 2022-12-21 RX ORDER — SODIUM CHLORIDE 9 MG/ML
1000 INJECTION, SOLUTION INTRAVENOUS
Refills: 0 | Status: DISCONTINUED | OUTPATIENT
Start: 2022-12-21 | End: 2022-12-28

## 2022-12-21 RX ORDER — CLOPIDOGREL BISULFATE 75 MG/1
75 TABLET, FILM COATED ORAL DAILY
Refills: 0 | Status: DISCONTINUED | OUTPATIENT
Start: 2022-12-21 | End: 2022-12-28

## 2022-12-21 RX ORDER — DEXTROSE 50 % IN WATER 50 %
12.5 SYRINGE (ML) INTRAVENOUS ONCE
Refills: 0 | Status: DISCONTINUED | OUTPATIENT
Start: 2022-12-21 | End: 2022-12-28

## 2022-12-21 RX ADMIN — Medication 0.1 MILLIGRAM(S): at 04:22

## 2022-12-21 RX ADMIN — SODIUM CHLORIDE 3 MILLILITER(S): 9 INJECTION INTRAMUSCULAR; INTRAVENOUS; SUBCUTANEOUS at 02:12

## 2022-12-21 RX ADMIN — CLOPIDOGREL BISULFATE 75 MILLIGRAM(S): 75 TABLET, FILM COATED ORAL at 09:27

## 2022-12-21 RX ADMIN — Medication 10 MILLIGRAM(S): at 02:44

## 2022-12-21 RX ADMIN — AMLODIPINE BESYLATE 10 MILLIGRAM(S): 2.5 TABLET ORAL at 09:27

## 2022-12-21 RX ADMIN — Medication 15 MILLIGRAM(S): at 04:39

## 2022-12-21 RX ADMIN — Medication 100 MILLIGRAM(S): at 04:22

## 2022-12-21 RX ADMIN — Medication 10 MILLIGRAM(S): at 02:17

## 2022-12-21 RX ADMIN — Medication 15 MILLIGRAM(S): at 02:17

## 2022-12-21 RX ADMIN — Medication 25 MILLIGRAM(S): at 02:43

## 2022-12-21 RX ADMIN — Medication 20 MILLIGRAM(S): at 09:27

## 2022-12-21 RX ADMIN — Medication 650 MILLIGRAM(S): at 09:28

## 2022-12-21 NOTE — CONSULT NOTE ADULT - PROBLEM SELECTOR RECOMMENDATION 9
.  - /123 in the setting of acute COVID19 infection  - s/p 20mg IVP hydralazine and 20mg PO hydralazine with improvement in BP to 186/82  - reports compliance with all home medications  - restart OP antihypertensives  - norvasc 10mg PO daily, clonidine 0.1mg PO daily, enalapril 20mg PO daily, Toprol 100mg PO daily

## 2022-12-21 NOTE — ED ADULT TRIAGE NOTE - CHIEF COMPLAINT QUOTE
Patient BIBEMS c/o right sided chest pain as well as a cough and congestion since testing positive for COVID 15 days ago. Patient hypertensive on arrival but denies HA, dizziness, vision changes. Hx of HTN, DM.

## 2022-12-21 NOTE — ED CDU PROVIDER DISPOSITION NOTE - CARE PROVIDER_API CALL
Hood Ochoa)  Cardiovascular Disease; Internal Medicine  [address] [place] [address]  Phone: [phone]  Fax: [phone]  Follow Up Time:

## 2022-12-21 NOTE — CONSULT NOTE ADULT - PROBLEM SELECTOR RECOMMENDATION 2
.  - chest pain, now resolved after antihypertensives  - reports cough x 15 days and now COVID+  - CXR with pulm vasc congestion  - lungs clear to auscultation, no acute distress, on room air  - Trop neg x 1, continue to trend   - EKG NSR with nonspecific TWI  - previous TTE 2020 with EF 60-65% G1DD, segmental WMA in RCA territory  - pending repeat TTE for evaluation of cardiac function and any valvular abnormalities  - further inpatient workup pending results of TTE .  - chest pain, now resolved after antihypertensives  - reports cough x 15 days and now COVID+  - CXR with pulm vasc congestion  - lungs clear to auscultation, no acute distress, on room air  - Trop neg x 1, continue to trend   - EKG NSR with nonspecific TWI  - previous TTE 2020 with EF 60-65% G1DD, segmental WMA in RCA territory  - pending repeat TTE for evaluation of cardiac function and any valvular abnormalities  - further inpatient workup pending results of TTE  - check Ddimer, r/o PE

## 2022-12-21 NOTE — ED CDU PROVIDER DISPOSITION NOTE - NSFOLLOWUPINSTRUCTIONS_ED_ALL_ED_FT
Deje de talia enalapril y metoprolol. Comience a talia labetalol 400 mg dos veces al día. Tyrell un seguimiento con el médico de atención primaria en 2-3 días Seguimiento con cardiología Regrese a la jacobo de emergencias por cualquier síntoma nuevo o que empeore  Hipertensión  La hipertensión, comúnmente llamada presión arterial myles, es cuando la fuerza de la edith que bombea a través de las arterias es demasiado mayur. La hipertensión obliga a boogie corazón a trabajar más para bombear edith. [place] arterias pueden volverse estrechas o rígidas. Tener hipertensión sin tratar o sin controlar nilson un steven período de tiempo puede causar un ataque al corazón, un derrame cerebral, enfermedad renal y otros problemas. Si comenzó con un medicamento, tómelo exactamente araceli se lo recetó boogie profesional de la cory. Mantenga un estilo de marija saludable y tyrell un seguimiento con boogie médico de atención primaria.  BUSQUE ATENCIÓN MÉDICA INMEDIATA SI TIENE ALGUNO DE LOS SIGUIENTES SÍNTOMAS: dolor de pete intenso, confusión, dolor en el pecho, dolor abdominal, vómitos o dificultad para respirar.

## 2022-12-21 NOTE — CONSULT NOTE ADULT - ASSESSMENT
62 y/o F with PMH HTN, CAD (1 JONATHAN to pLAD in 2020,  of mLAD, medical management of dOM1 & RPL disease) who presents to [facility] ED with chest pain, hypertensive urgency and also found to be COVID+.

## 2022-12-21 NOTE — ED PROVIDER NOTE - CARE PLAN
Principal Discharge DX:	ACS (acute coronary syndrome)  Secondary Diagnosis:	Uncontrolled hypertension   1

## 2022-12-21 NOTE — CONSULT NOTE ADULT - PROBLEM SELECTOR RECOMMENDATION 3
.  - [facility] 6/2020 : Multivessel CAD (prox LAD 80, mid , distal OM 90, RPLA of RCA 70)  - s/p 1 JONATHAN to proximal LAD. Unsuccessful attempt to cross the mid-distal LAD    - continue GDMT:        DAPT: ASA 81mg/Plavix 75mg       Statin: Atorvastatin 40mg         BetaBlocker: Toprol XL 100mg Po daily       Other Antianginals: n/a       Aggressive cardiovascular risk reduction and lifestyle modification.  - repeat TTE is pending

## 2022-12-21 NOTE — ED CDU PROVIDER INITIAL DAY NOTE - CLINICAL SUMMARY MEDICAL DECISION MAKING FREE TEXT BOX
Pt with hx of HTN, DM and recently dx with covid. trop and age adjusted dimer negative. no hypoxia. bp improvement with iv hydralazine. [place] cardiology recs appreciated. pt nontoxic appearing stable vitals now. will continue oral meds and pending TTE

## 2022-12-21 NOTE — ED PROVIDER NOTE - OBJECTIVE STATEMENT
61 y/o female with pmhx of HLD, DMT2 metformin and HTN Amlodipine c/o sudden chest "pressure" pain started today. Denies chest pain associated with breathing or coughing. Pt was tested covid+ yesterday. Denies drinking or smoking. Pt was hypertensive on arrival to ED.    George

## 2022-12-21 NOTE — ED ADULT NURSE NOTE - OBJECTIVE STATEMENT
AxOx4. Patient c/o right sided chest pain and constant cough with nasal drainage due to congestion. Patient states she tested positive for COVID a few days ago and want to come to ED for further evaluation. Patient states this is her 2nd time with COVID and during her first time with COVID she required hospitalization. Patient states she has a Hx of HTN, DM and is compliant with her medications. IV placed, labs sent, medicated as per orders.

## 2022-12-21 NOTE — ED CDU PROVIDER DISPOSITION NOTE - CLINICAL COURSE
61 y/o female with pmhx of HTN, HLD DMT2 on metformin CAD with PCI presenting to the ED with 2 days elevated bp at home systolics reported in the 200's with associated substernal chest pain. +COVID. BP improved s/p IV and PO hydralazine. Serial troponins negative. Echo unremarkable. Pt not hypoxic. D Dimer negative when age-adjusted. Pt seen by cardiology advised dc home with change in BP meds - stop enalapril, and change metoprolol to labetolol 400mg BID

## 2022-12-21 NOTE — CONSULT NOTE ADULT - SUBJECTIVE AND OBJECTIVE BOX
[facility]                                              CARDIOLOGY AT [address], [place] [address]                                             Telephone: [phone]. Fax:[phone]                                                       CARDIOLOGY CONSULTATION NOTE                                                                                             History obtained by: Patient and medical record  Community Cardiologist: Dr. Ochoa  Reason for Consultation: Hypertensive urgency  Available out pt records reviewed: Yes [ x ] No [  ]    Chief complaint:    Patient is a 62y old  Female who presents with a chief complaint of     HPI:  64 y/o F with PMH HTN, CAD (1 JONATHAN to pLAD in 2020,  of mLAD, medical management of dOM1 & RPL disease) who presents to [facility] ED with chest pain, hypertensive urgency and also found to be COVID+. Patient states that she has had a cough for the past 15 days, along with runny nose and headache. She followed up on 12/13 with Dr. Sadler for some lightheadedness and BP at that time found to be WNL. Today she took a home COVID test which was found to be positive. She became very anxious and began feeling chest tightness and she decided to come in for evaluation. In ED BP found to 240/123, BP similar in both arms. EKG NSR with nonspecific TWI unchanged from EKG on 12/13. She reports compliance with all medications and endorses taking them all in the morning. CXR with vascular congestion. She currently denies back pain, headache, dizziness, SOB, RUBIN, diaphoresis, syncope or N/V.        CARDIAC TESTING   ECHO:    < from: TTE Echo Complete w/o Contrast w/ Doppler (06.17.20 @ 17:58) >  PHYSICIAN INTERPRETATION:  Left Ventricle: The left ventricular internal cavity size is normal. Left ventricular wall thickness is moderately increased.  Global LV systolic function was normal. Left ventricular ejection fraction, by visual estimation, is 60 to 65%. Spectral Doppler shows impaired relaxation pattern of left ventricular myocardial filling (Grade I diastolic dysfunction). Segmental wall motion abnormalities in RCA territory.       LV Wall Scoring:  The entire inferior wall is hypokinetic.    Right Ventricle: Normal right ventricular size and function.  Left Atrium: Normal left atrial size.  Right Atrium: Normal right atrial size.  Pericardium: There is no evidence of pericardial effusion. There is a significant pericardial fat pad present.  Mitral Valve: Mild thickening of the anterior and posterior mitral valve leaflets. Trace mitral valve regurgitation is seen.  Tricuspid Valve: The tricuspid valve is normal in structure. Trivial tricuspid regurgitation is visualized.  Aortic Valve: The aortic valve is trileaflet. Peak transaortic gradient equals 3.2 mmHg, mean transaortic gradient equals 1.7 mmHg, the calculated aortic valve area equals 3.43 cm² by the continuity equation consistent with normally opening aortic valve. Trivial aortic valve regurgitation is seen.  Pulmonic Valve: The pulmonic valve is normal.  Aorta: The aortic root is normal in size and structure.  Pulmonary Artery: The pulmonary artery is not well seen.  Venous: The inferior vena cava was normal sized, with respiratory size variation greater than 50%.       Summary:   1. Normal left atrial size.   2. There is moderate concentric left ventricular hypertrophy.   3. Segmental wall motion abnormalities in RCA territory.   4. Left ventricular ejection fraction, by visual estimation, is 60 to 65%. Grade I diastolic dysfunction.   5. Normal right atrial size.   6. Normal right ventricular size and function.   7. No significant valvular abnormality.   8. There is no evidence of pericardial effusion.    MD Lawanda, RPVI Electronically signed on 6/18/2020 at 11:51:59 AM    < end of copied text >  STRESS:    CATH:   < from: Cardiac Cath Lab - Adult (06.18.20 @ 13:48) >  VENTRICLES: EF calculated by contrast ventriculography was 50 %.  CORONARY VESSELS: The coronary circulation is right dominant.  LM:   --  LM: Normal.  LAD:   --  Proximal LAD: There was a 80 % stenosis. IVUS confirmed LAD 80%  lumen dimaeter stenosis  --  Mid LAD: There was a tubular 40 % stenosis in the proximal third of the  vessel segment. In a second lesion, there was a 100 % stenosis in the  distal third of the vessel segment.  --  D1: The vessel was medium sized. There was a tubular 75 % stenosis in  the middle third of the vessel segment.  CX:   --  Circumflex: The vessel was large sized.  --  OM1: The vessel was large sized. There was a discrete 90 % stenosis in  the distal third of the vessel segment.  RCA:   --  RCA: The vessel was large sized (dominant).  --  RPDA: There was a 50 % stenosis at the ostium of the vessel segment.  --  RPLS: There was a tubular 70 % stenosis in the middle third of the  vessel segment.  COMPLICATIONS: No complications occurred during the cath lab visit.  SUMMARY:  Summary: Addendum 6/22/20: Case reconfirmed to remove No LV gram was  performed; however, a recent echocardiogram demonstrated an EF of 60% and  add EF by Ventriculography was 50%  DIAGNOSTIC IMPRESSIONS: Multivessel CAD ( Proximal LAD 80% , mid % ,  distal OM 90% , RPL of RCA 70%)  INTERVENTIONAL IMPRESSIONS: Unsucessful attempt to cross the mid-distal LAD   ( unable to verify wire position in true lumen)  PCI and JONATHAN of proximal LAD using a 3.5 x 15 mm Synergy stent.  INTERVENTIONAL RECOMMENDATIONS: continue on ASA 81 mg and clopidogrel 600  mg now then 75 mg daily for at least 6 months  Atorvastation to lower LDL < 70  Medical management of distal OM1 and RPL disease ( mild ischemia in RCA  territory)  Cardiac rehabilitation referral  Info provided to enroll in cardiac rehab  Cardiac rehab communicated with regarding patient.  Prepared and signed by  Riley Bajwa MD  Signed 06/22/2020 11:00:41    < end of copied text >    ELECTROPHYSIOLOGY:     PAST MEDICAL HISTORY  No pertinent past medical history  Type 2 diabetes mellitus  Hypertension  Hyperlipidemia  Obesity  COVID-19  CAD in native artery    PAST SURGICAL HISTORY  No significant past surgical history    SOCIAL HISTORY:  Denies smoking/alcohol/drugs  CIGARETTES:     ALCOHOL:  DRUGS:    FAMILY HISTORY:  FHx: hypertension  Father    Family History of Cardiovascular Disease:  Yes [  x] No [  ]  Coronary Artery Disease in first degree relative: Yes [ x ] No [  ]  Sudden Cardiac Death in First degree relative: Yes [  ] No [x  ]    HOME MEDICATIONS:  amLODIPine 10 mg oral tablet: 1 tab(s) orally once a day (24 Aug 2020 21:43)  aspirin 81 mg oral delayed release tablet: 1 tab(s) orally once a day (24 Aug 2020 21:43)  atorvastatin 40 mg oral tablet: 1 tab(s) orally once a day (24 Aug 2020 21:43)  cloNIDine 0.1 mg oral tablet: 1 tab(s) orally 2 times a day (26 Aug 2020 12:50)  clopidogrel 75 mg oral tablet: 1 tab(s) orally once a day (24 Aug 2020 21:43)  enalapril 20 mg oral tablet: 1 tab(s) orally once a day (24 Aug 2020 21:43)  metFORMIN 1000 mg oral tablet: 1 tab(s) orally 2 times a day (24 Aug 2020 21:43)  metoprolol tartrate 100 mg oral tablet: 1 tab(s) orally once a day (24 Aug 2020 21:43)      CURRENT CARDIAC MEDICATIONS:      CURRENT OTHER MEDICATIONS:      ALLERGIES:   penicillin (Hives; Pruritus)      REVIEW OF SYMPTOMS:   CONSTITUTIONAL: No fever, no chills, no weight loss, no weight gain, no fatigue   ENMT:  No vertigo; No sinus or throat pain  NECK: No pain or stiffness  CARDIOVASCULAR: + chest pain, no dyspnea, no syncope/presyncope, no palpitations, no dizziness, no Orthopnea, no Paroxsymal nocturnal dyspnea  RESPIRATORY: no Shortness of breath, + cough, no wheezing  : No dysuria, no hematuria   GI: No dark color stool, no nausea, no diarrhea, no constipation, no abdominal pain   NEURO: + headache, no slurred speech   MUSCULOSKELETAL: No joint pain or swelling; No muscle, back, or extremity pain  PSYCH: No agitation, no anxiety.    ALL OTHER REVIEW OF SYSTEMS ARE NEGATIVE.    VITAL SIGNS:  T(C): 37.5 (12-21-22 @ 00:28), Max: 37.5 (12-21-22 @ 00:28)  T(F): 99.5 (12-21-22 @ 00:28), Max: 99.5 (12-21-22 @ 00:28)  HR: 92 (12-21-22 @ 03:02) (78 - 93)  BP: 186/82 (12-21-22 @ 03:02) (186/82 - 222/89)  RR: 18 (12-21-22 @ 03:02) (16 - 18)  SpO2: 98% (12-21-22 @ 03:02) (98% - 99%)    INTAKE AND OUTPUT:       PHYSICAL EXAM:  Constitutional: Comfortable . No acute distress.   HEENT: Atraumatic and normocephalic , neck is supple . no JVD. No carotid bruit.  CNS: A&Ox3. No focal deficits.   Respiratory: CTAB, unlabored   Cardiovascular: RRR normal s1 s2. No murmur. No rubs or gallop.  Gastrointestinal: Soft, non-tender. +Bowel sounds.   Extremities: 2+ Peripheral Pulses, No clubbing, cyanosis, or edema  Psychiatric: Calm . no agitation.   Skin: Warm and dry, no ulcers on extremities     LABS:  ( 21 Dec 2022 01:48 )  Troponin T  <0.01,  CPK  156  , CKMB  X    , BNP X              12-21    135  |  100  |  13.0  ----------------------------<  195<H>  4.8   |  23.0  |  0.77    Ca    9.1      21 Dec 2022 01:48    TPro  7.9  /  Alb  3.9  /  TBili  0.7  /  DBili  x   /  AST  36<H>  /  ALT  20  /  AlkPhos  111  12-21    INTERPRETATION OF TELEMETRY:     ECG: NSR, nonspecific TWI, LVH  Prior ECG: Yes [  ] No [  ]    RADIOLOGY & ADDITIONAL STUDIES:    X-ray:  reviews  CT scan:   MRI:   US:

## 2022-12-21 NOTE — ED CDU PROVIDER INITIAL DAY NOTE - PROGRESS NOTE DETAILS
Pt signed out to me at 7am ; feels better no further CP /SOB. BP improved. Trop negative x 2 , echo unremarkable. cardiology consult appreciated - will dc with outlined medication adjustments as stated in note and f/u outpatient. wolf pt with

## 2022-12-21 NOTE — ED CDU PROVIDER INITIAL DAY NOTE - OBJECTIVE STATEMENT
61 y/o female with pmhx of HTn, HLD DMT2 on metformin CAD with PCI presenting to the ED with 2 days elevated bp at home systolics reported in the 200's with associated substernal chest pain intermittent no associated sob, or vomiting, + headache no visual changes no numbness or tingling to extremities. PT with symptomatic improvement since being in the ED after iv hydralazine. pt seen by cardiology whom is requesting BP monitoring. found to have covid + testing but reports that two weeks ago had uri symptoms no associated fever. vax  x2  covid and received her flu vax this past year. no hx of smoking

## 2022-12-21 NOTE — CONSULT NOTE ADULT - NS ATTEND AMEND GEN_ALL_CORE FT
seen with above,    63F history significant for resistant HTN, CAD with prior stent in 2020 to pLAD and small branch vessel disease medically managed recently seen in office 12/13 stable with controlled BP but with URI symptoms for 2 weeks tested +COVID yesterday then became anxious/worry developed chest pain and presents with HTN urgency SBP 240s, EKG with new lateral T-wave changes, 1st set Troponin negative, interval BP improved.   -TTE with normal structural heart function  -repeat EKG and 2nd set Troponin with CK, if no elevation then stable for discharge home to follow up in the office.   -continue home antihypertensives, evidence of renal artery stenosis on prior US in 8/2020 needs further workup outpatient and recommend to discontinue enalapril and change metoprolol to labetalol 400mg BID.       Ezra Brumfield DO, [facility]  Faculty Non-Invasive Cardiologist  [phone]

## 2022-12-21 NOTE — ED ADULT NURSE NOTE - CHIEF COMPLAINT QUOTE
----- Message from Siddhartha Gill MA sent at 9/9/2022 10:21 AM CDT -----  Contact: patient  Patient called in and stated his knees are really hurting him and cannot get another injection yet.  Patient wanted to see if a Rx could be called in for him?      [facility] [address] - [address]  [address] [place] [address]  Phone: [phone] Fax: [phone]    Patient call back number is [phone]     Patient BIBEMS c/o right sided chest pain as well as a cough and congestion since testing positive for COVID 15 days ago. Patient hypertensive on arrival but denies HA, dizziness, vision changes. Hx of HTN, DM.

## 2022-12-21 NOTE — ED CDU PROVIDER DISPOSITION NOTE - ATTENDING APP SHARED VISIT CONTRIBUTION OF CARE
63 y/o female with pmhx of HTN, HLD DMT2 on metformin CAD with PCI presenting to the ED with 2 days elevated bp at home systolics reported in the 200's with associated substernal chest pain. +COVID. BP improved s/p IV and PO hydralazine. Serial troponins negative. Echo unremarkable. Pt not hypoxic. D Dimer negative when age-adjusted. Pt seen by cardiology advised dc home with change in BP meds - stop enalapril, and change metoprolol to labetolol 400mg BID  pt feeling improved and comfortable with dc plan.

## 2022-12-21 NOTE — ED CDU PROVIDER DISPOSITION NOTE - PATIENT PORTAL LINK FT
You can access the FollowMyHealth Patient Portal offered by [facility] by registering at the following website: [email or web address]. By joining [facility]’s FollowMyHealth portal, you will also be able to view your health information using other applications (apps) compatible with our system.

## 2022-12-21 NOTE — ED PROVIDER NOTE - CLINICAL SUMMARY MEDICAL DECISION MAKING FREE TEXT BOX
Pt with hx of HTN, DM and recently dx with covid. r/o cardiac related chest pain vs pleuritic chest pain vs covid related issues such as PE or PNA. Will check labs, CXR, and EKG.

## 2022-12-21 NOTE — ED CDU PROVIDER INITIAL DAY NOTE - NS ED ATTENDING STATEMENT MOD
This was a shared visit with the [place]. I reviewed and verified the documentation and independently performed the documented:

## 2023-01-06 ENCOUNTER — APPOINTMENT (OUTPATIENT)
Dept: CARDIOLOGY | Facility: CLINIC | Age: 63
End: 2023-01-06
Payer: MEDICAID

## 2023-01-06 PROCEDURE — 93880 EXTRACRANIAL BILAT STUDY: CPT

## 2023-01-12 ENCOUNTER — NON-APPOINTMENT (OUTPATIENT)
Age: 63
End: 2023-01-12

## 2023-02-21 NOTE — ED PROCEDURE NOTE - PROCEDURE DATE TIME, MLM
25-Aug-2020 11:59 May weight bear as tolerated in knee immobilizer. Knee immobilizer must be kept on at all times. DO NOT REMOVE KNEE IMMOBILIZER.

## 2023-02-25 NOTE — ED ADULT TRIAGE NOTE - HEIGHT IN FEET
Spoke to Daughter Nancy Dubon) on the phone to give update and complete the Pt's med list.     Michael Velasquez RN  02/24/23 [phone]

## 2023-06-23 NOTE — ED ADULT NURSE NOTE - NSIMPLEMENTINTERV_GEN_ALL_ED
PAPA 2/2 ATN  Improving -- Cr now 1.7 improving -- this may account for increasing insulin requirements   Keep renal function in mind when adjusting insulin doses as renal clearance impacts insulin requirement      Implemented All Universal Safety Interventions:  [place] to call system. Call bell, personal items and telephone within reach. Instruct patient to call for assistance. Room bathroom lighting operational. Non-slip footwear when patient is off stretcher. Physically safe environment: no spills, clutter or unnecessary equipment. Stretcher in lowest position, wheels locked, appropriate side rails in place.

## 2023-06-26 ENCOUNTER — NON-APPOINTMENT (OUTPATIENT)
Age: 63
End: 2023-06-26

## 2023-06-26 ENCOUNTER — APPOINTMENT (OUTPATIENT)
Dept: CARDIOLOGY | Facility: CLINIC | Age: 63
End: 2023-06-26
Payer: MEDICARE

## 2023-06-26 VITALS
HEART RATE: 103 BPM | WEIGHT: 181 LBS | BODY MASS INDEX: 34.2 KG/M2 | DIASTOLIC BLOOD PRESSURE: 100 MMHG | OXYGEN SATURATION: 95 % | TEMPERATURE: 98.9 F | SYSTOLIC BLOOD PRESSURE: 181 MMHG

## 2023-06-26 VITALS — SYSTOLIC BLOOD PRESSURE: 178 MMHG | DIASTOLIC BLOOD PRESSURE: 112 MMHG

## 2023-06-26 PROCEDURE — 93000 ELECTROCARDIOGRAM COMPLETE: CPT

## 2023-06-26 PROCEDURE — 99214 OFFICE O/P EST MOD 30 MIN: CPT | Mod: 25

## 2023-06-26 RX ORDER — CLOPIDOGREL BISULFATE 75 MG/1
75 TABLET, FILM COATED ORAL
Qty: 90 | Refills: 1 | Status: DISCONTINUED | COMMUNITY
Start: 2020-07-09 | End: 2023-06-26

## 2023-06-26 RX ORDER — METOPROLOL TARTRATE 100 MG/1
100 TABLET, FILM COATED ORAL TWICE DAILY
Qty: 180 | Refills: 1 | Status: DISCONTINUED | COMMUNITY
Start: 2021-04-26 | End: 2023-06-26

## 2023-06-26 RX ORDER — METFORMIN HYDROCHLORIDE 1000 MG/1
1000 TABLET, COATED ORAL
Qty: 180 | Refills: 0 | Status: DISCONTINUED | COMMUNITY
Start: 2020-03-23 | End: 2023-06-26

## 2023-06-26 RX ORDER — KRILL/OM-3/DHA/EPA/PHOSPHO/AST 1000-230MG
81 CAPSULE ORAL
Qty: 90 | Refills: 1 | Status: DISCONTINUED | COMMUNITY
Start: 2021-04-26 | End: 2023-06-26

## 2023-06-26 RX ORDER — CLONIDINE HYDROCHLORIDE 0.1 MG/1
0.1 TABLET ORAL
Qty: 90 | Refills: 1 | Status: DISCONTINUED | COMMUNITY
Start: 2020-08-24 | End: 2023-06-26

## 2023-06-26 NOTE — DISCUSSION/SUMMARY
[Patient] : the patient [With Me] : with me [___ Month(s)] : in [unfilled] month(s) [FreeTextEntry3] : keep BP log book for 1 week and FU with RN

## 2023-06-26 NOTE — CARDIOLOGY SUMMARY
[___] : [unfilled] [de-identified] : 6/26/2023\par Sinus  Tachycardia  - occasional ectopic ventricular beat   \par -Left atrial enlargement. \par  Voltage criteria for LVH  (R(I)+S(III) exceeds 2.50 mV). \par  -Decreasing R-wave progression -may be secondary to left ventricular hypertrophy   consider old anterior infarct. \par  -Nonspecific ST depression   +   T-abnormality  -Seen with left ventricular hypertrophy (strain)  consider  Lateral ischemia. \par ABNORMAL \par \par \par 9/12/2022\par Sinus  Rhythm \par Voltage criteria for LVH  (R(I)+S(III) exceeds 2.50 mV)  -Voltage criteria w/o ST/T abnormality may be normal. \par  -Nonspecific ST depression   +   Negative T-waves  -Nondiagnostic  -Possible  Lateral  ischemia. \par ABNORMAL \par

## 2023-06-26 NOTE — PHYSICAL EXAM
[Well Developed] : well developed [Well Nourished] : well nourished [No Acute Distress] : no acute distress [Normal Venous Pressure] : normal venous pressure [No Carotid Bruit] : no carotid bruit [Normal S1, S2] : normal S1, S2 [No Murmur] : no murmur [No Rub] : no rub [No Gallop] : no gallop [Clear Lung Fields] : clear lung fields [Good Air Entry] : good air entry [No Respiratory Distress] : no respiratory distress  [Soft] : abdomen soft [Non Tender] : non-tender [No Masses/organomegaly] : no masses/organomegaly [Normal Bowel Sounds] : normal bowel sounds [Normal Gait] : normal gait [No Edema] : no edema [No Cyanosis] : no cyanosis [No Clubbing] : no clubbing [No Varicosities] : no varicosities [No Rash] : no rash [No Skin Lesions] : no skin lesions [Moves all extremities] : moves all extremities [No Focal Deficits] : no focal deficits [Normal Speech] : normal speech [Alert and Oriented] : alert and oriented [Normal memory] : normal memory [General Appearance - Well Developed] : well developed [Normal Appearance] : normal appearance [Well Groomed] : well groomed [General Appearance - Well Nourished] : well nourished [No Deformities] : no deformities [General Appearance - In No Acute Distress] : no acute distress [Normal Conjunctiva] : the conjunctiva exhibited no abnormalities [Eyelids - No Xanthelasma] : the eyelids demonstrated no xanthelasmas [Normal Oral Mucosa] : normal oral mucosa [No Oral Pallor] : no oral pallor [No Oral Cyanosis] : no oral cyanosis [Normal Jugular Venous A Waves Present] : normal jugular venous A waves present [Normal Jugular Venous V Waves Present] : normal jugular venous V waves present [No Jugular Venous Hernandez A Waves] : no jugular venous hernandez A waves [Exaggerated Use Of Accessory Muscles For Inspiration] : no accessory muscle use [Auscultation Breath Sounds / Voice Sounds] : lungs were clear to auscultation bilaterally [Heart Rate And Rhythm] : heart rate and rhythm were normal [Heart Sounds] : normal S1 and S2 [Murmurs] : no murmurs present [Abdomen Soft] : soft [Abdomen Tenderness] : non-tender [Abdomen Mass (___ Cm)] : no abdominal mass palpated [Abnormal Walk] : normal gait [Gait - Sufficient For Exercise Testing] : the gait was sufficient for exercise testing [Nail Clubbing] : no clubbing of the fingernails [Cyanosis, Localized] : no localized cyanosis [Petechial Hemorrhages (___cm)] : no petechial hemorrhages [Skin Color & Pigmentation] : normal skin color and pigmentation [] : no rash [No Venous Stasis] : no venous stasis [Skin Lesions] : no skin lesions [No Skin Ulcers] : no skin ulcer [No Xanthoma] : no  xanthoma was observed [Oriented To Time, Place, And Person] : oriented to person, place, and time [Affect] : the affect was normal [Mood] : the mood was normal [No Anxiety] : not feeling anxious

## 2023-06-26 NOTE — HISTORY OF PRESENT ILLNESS
[FreeTextEntry1] : Language LIne interpreters\par Tasha\par [id number]\par \par Daughter in law in attendance\par \par \par HTN\par Pt states she gets nervous in Doctor's office\par Self BP checks d/w pt\par Keep BP log book and FU with RN in 1 week d/w pt\par \par \par CAD S/P  PCI-STENT;  OF MID-DISTAL LAD\par A) CTA Chest 6/16/2020: Coronary calcification. No PE.\par B)  [facility] 6/18/2020 due to abnormal stress test: LAD: prox 80, mid 40, and there is 100% stenosis in the distal third segment of the vessel, D1 75; Cx: OM1 90;  RCA: RPDA 50, RPLS 70\par Impression: Multivessel CAD (prox LAD 80, mid , distal OM 90, RPLA of RCA 70\par Intervention: JONATHAN to proximal LAD. Unsuccessful attempt to cross the mid-distal LAD  (unable to verify wire position in true lumen). PCI and JONATHAN of proximal LAD done.\par C) Echo 6/2020: TLS. Trivial pericardial effusion.\par  \par \par ROS\par Denied dizziness, dyspnea orthopnea, pnd, edema, cp, angina, palpation, bleeding, syncope, near syncope. \par \par \par CURRENT CARDIAC  MEDS\par Amlodipine 10 mg daily\par ASA 81 mg daily (Pt self DC'ed)\par Atorvastatin 40 mg daily\par Clopidogrel 75 mg daily (Pt self DC'ed)\par Enalapril 20 mg twice daily\par Labetalol 200 mg twice daily\par Clonidine 0.1 mg 3 times daily\par \par \par FUNCTIONAL CAPACITY\par Estimated <4.0 METS\par \par \par CHRONIC, STABLE CONDITIONS\par KIRAN\par DM\par \par \par CARDIAC TESTING\par

## 2023-09-11 ENCOUNTER — APPOINTMENT (OUTPATIENT)
Dept: CARDIOLOGY | Facility: CLINIC | Age: 63
End: 2023-09-11

## 2023-09-25 ENCOUNTER — INPATIENT (INPATIENT)
Facility: HOSPITAL | Age: 63
LOS: 0 days | Discharge: HOME | DRG: 305 | End: 2023-09-26
Attending: STUDENT IN AN ORGANIZED HEALTH CARE EDUCATION/TRAINING PROGRAM | Admitting: INTERNAL MEDICINE
Payer: MEDICARE

## 2023-09-25 VITALS
OXYGEN SATURATION: 98 % | DIASTOLIC BLOOD PRESSURE: 121 MMHG | WEIGHT: 199.96 LBS | HEIGHT: 65 IN | TEMPERATURE: 98 F | HEART RATE: 104 BPM | SYSTOLIC BLOOD PRESSURE: 224 MMHG | RESPIRATION RATE: 15 BRPM

## 2023-09-25 DIAGNOSIS — I16.0 HYPERTENSIVE URGENCY: ICD-10-CM

## 2023-09-25 DIAGNOSIS — I25.10 ATHEROSCLEROTIC HEART DISEASE OF NATIVE CORONARY ARTERY WITHOUT ANGINA PECTORIS: ICD-10-CM

## 2023-09-25 LAB
ALBUMIN SERPL ELPH-MCNC: 4.2 G/DL — SIGNIFICANT CHANGE UP (ref 3.3–5.2)
ALP SERPL-CCNC: 145 U/L — HIGH (ref 40–120)
ALT FLD-CCNC: 17 U/L — SIGNIFICANT CHANGE UP
ANION GAP SERPL CALC-SCNC: 14 MMOL/L — SIGNIFICANT CHANGE UP (ref 5–17)
APTT BLD: 28.7 SEC — SIGNIFICANT CHANGE UP (ref 24.5–35.6)
AST SERPL-CCNC: 18 U/L — SIGNIFICANT CHANGE UP
BASOPHILS # BLD AUTO: 0.05 K/UL — SIGNIFICANT CHANGE UP (ref 0–0.2)
BASOPHILS NFR BLD AUTO: 0.6 % — SIGNIFICANT CHANGE UP (ref 0–2)
BILIRUB SERPL-MCNC: 0.7 MG/DL — SIGNIFICANT CHANGE UP (ref 0.4–2)
BUN SERPL-MCNC: 10.6 MG/DL — SIGNIFICANT CHANGE UP (ref 8–20)
CALCIUM SERPL-MCNC: 9.4 MG/DL — SIGNIFICANT CHANGE UP (ref 8.4–10.5)
CHLORIDE SERPL-SCNC: 99 MMOL/L — SIGNIFICANT CHANGE UP (ref 96–108)
CO2 SERPL-SCNC: 25 MMOL/L — SIGNIFICANT CHANGE UP (ref 22–29)
CREAT SERPL-MCNC: 0.57 MG/DL — SIGNIFICANT CHANGE UP (ref 0.5–1.3)
EGFR: 101 ML/MIN/1.73M2 — SIGNIFICANT CHANGE UP
EOSINOPHIL # BLD AUTO: 0.07 K/UL — SIGNIFICANT CHANGE UP (ref 0–0.5)
EOSINOPHIL NFR BLD AUTO: 0.8 % — SIGNIFICANT CHANGE UP (ref 0–6)
GLUCOSE BLDC GLUCOMTR-MCNC: 227 MG/DL — HIGH (ref 70–99)
GLUCOSE BLDC GLUCOMTR-MCNC: 337 MG/DL — HIGH (ref 70–99)
GLUCOSE BLDC GLUCOMTR-MCNC: 375 MG/DL — HIGH (ref 70–99)
GLUCOSE SERPL-MCNC: 331 MG/DL — HIGH (ref 70–99)
HCT VFR BLD CALC: 43.1 % — SIGNIFICANT CHANGE UP (ref 34.5–45)
HGB BLD-MCNC: 14.3 G/DL — SIGNIFICANT CHANGE UP (ref 11.5–15.5)
IMM GRANULOCYTES NFR BLD AUTO: 0.3 % — SIGNIFICANT CHANGE UP (ref 0–0.9)
INR BLD: 1 RATIO — SIGNIFICANT CHANGE UP (ref 0.85–1.18)
LYMPHOCYTES # BLD AUTO: 1.63 K/UL — SIGNIFICANT CHANGE UP (ref 1–3.3)
LYMPHOCYTES # BLD AUTO: 18.7 % — SIGNIFICANT CHANGE UP (ref 13–44)
MCHC RBC-ENTMCNC: 26.8 PG — LOW (ref 27–34)
MCHC RBC-ENTMCNC: 33.2 GM/DL — SIGNIFICANT CHANGE UP (ref 32–36)
MCV RBC AUTO: 80.9 FL — SIGNIFICANT CHANGE UP (ref 80–100)
MONOCYTES # BLD AUTO: 0.49 K/UL — SIGNIFICANT CHANGE UP (ref 0–0.9)
MONOCYTES NFR BLD AUTO: 5.6 % — SIGNIFICANT CHANGE UP (ref 2–14)
NEUTROPHILS # BLD AUTO: 6.44 K/UL — SIGNIFICANT CHANGE UP (ref 1.8–7.4)
NEUTROPHILS NFR BLD AUTO: 74 % — SIGNIFICANT CHANGE UP (ref 43–77)
NT-PROBNP SERPL-SCNC: 356 PG/ML — HIGH (ref 0–300)
PLATELET # BLD AUTO: 282 K/UL — SIGNIFICANT CHANGE UP (ref 150–400)
POTASSIUM SERPL-MCNC: 3.8 MMOL/L — SIGNIFICANT CHANGE UP (ref 3.5–5.3)
POTASSIUM SERPL-SCNC: 3.8 MMOL/L — SIGNIFICANT CHANGE UP (ref 3.5–5.3)
PROT SERPL-MCNC: 8.1 G/DL — SIGNIFICANT CHANGE UP (ref 6.6–8.7)
PROTHROM AB SERPL-ACNC: 11.1 SEC — SIGNIFICANT CHANGE UP (ref 9.5–13)
RBC # BLD: 5.33 M/UL — HIGH (ref 3.8–5.2)
RBC # FLD: 13.1 % — SIGNIFICANT CHANGE UP (ref 10.3–14.5)
SODIUM SERPL-SCNC: 138 MMOL/L — SIGNIFICANT CHANGE UP (ref 135–145)
TROPONIN T SERPL-MCNC: <0.01 NG/ML — SIGNIFICANT CHANGE UP (ref 0–0.06)
WBC # BLD: 8.71 K/UL — SIGNIFICANT CHANGE UP (ref 3.8–10.5)
WBC # FLD AUTO: 8.71 K/UL — SIGNIFICANT CHANGE UP (ref 3.8–10.5)

## 2023-09-25 PROCEDURE — 93010 ELECTROCARDIOGRAM REPORT: CPT

## 2023-09-25 PROCEDURE — 71275 CT ANGIOGRAPHY CHEST: CPT | Mod: 26

## 2023-09-25 PROCEDURE — 74174 CTA ABD&PLVS W/CONTRAST: CPT | Mod: 26

## 2023-09-25 PROCEDURE — 99223 1ST HOSP IP/OBS HIGH 75: CPT

## 2023-09-25 PROCEDURE — 71045 X-RAY EXAM CHEST 1 VIEW: CPT | Mod: 26

## 2023-09-25 PROCEDURE — 70450 CT HEAD/BRAIN W/O DYE: CPT | Mod: 26,MA

## 2023-09-25 PROCEDURE — 99285 EMERGENCY DEPT VISIT HI MDM: CPT

## 2023-09-25 RX ORDER — DEXTROSE 50 % IN WATER 50 %
12.5 SYRINGE (ML) INTRAVENOUS ONCE
Refills: 0 | Status: DISCONTINUED | OUTPATIENT
Start: 2023-09-25 | End: 2023-09-26

## 2023-09-25 RX ORDER — DEXTROSE 50 % IN WATER 50 %
15 SYRINGE (ML) INTRAVENOUS ONCE
Refills: 0 | Status: DISCONTINUED | OUTPATIENT
Start: 2023-09-25 | End: 2023-09-26

## 2023-09-25 RX ORDER — LABETALOL HCL 100 MG
20 TABLET ORAL ONCE
Refills: 0 | Status: DISCONTINUED | OUTPATIENT
Start: 2023-09-25 | End: 2023-09-25

## 2023-09-25 RX ORDER — METOPROLOL TARTRATE 50 MG
10 TABLET ORAL ONCE
Refills: 0 | Status: DISCONTINUED | OUTPATIENT
Start: 2023-09-25 | End: 2023-09-25

## 2023-09-25 RX ORDER — INSULIN LISPRO 100/ML
VIAL (ML) SUBCUTANEOUS
Refills: 0 | Status: DISCONTINUED | OUTPATIENT
Start: 2023-09-25 | End: 2023-09-26

## 2023-09-25 RX ORDER — ENOXAPARIN SODIUM 100 MG/ML
40 INJECTION SUBCUTANEOUS EVERY 24 HOURS
Refills: 0 | Status: DISCONTINUED | OUTPATIENT
Start: 2023-09-25 | End: 2023-09-26

## 2023-09-25 RX ORDER — LABETALOL HCL 100 MG
10 TABLET ORAL ONCE
Refills: 0 | Status: DISCONTINUED | OUTPATIENT
Start: 2023-09-25 | End: 2023-09-25

## 2023-09-25 RX ORDER — AMLODIPINE BESYLATE 2.5 MG/1
10 TABLET ORAL DAILY
Refills: 0 | Status: DISCONTINUED | OUTPATIENT
Start: 2023-09-25 | End: 2023-09-26

## 2023-09-25 RX ORDER — ATORVASTATIN CALCIUM 80 MG/1
40 TABLET, FILM COATED ORAL AT BEDTIME
Refills: 0 | Status: DISCONTINUED | OUTPATIENT
Start: 2023-09-25 | End: 2023-09-26

## 2023-09-25 RX ORDER — METOPROLOL TARTRATE 50 MG
1 TABLET ORAL
Qty: 0 | Refills: 0 | DISCHARGE

## 2023-09-25 RX ORDER — ASPIRIN/CALCIUM CARB/MAGNESIUM 324 MG
324 TABLET ORAL ONCE
Refills: 0 | Status: COMPLETED | OUTPATIENT
Start: 2023-09-25 | End: 2023-09-25

## 2023-09-25 RX ORDER — DEXTROSE 50 % IN WATER 50 %
25 SYRINGE (ML) INTRAVENOUS ONCE
Refills: 0 | Status: DISCONTINUED | OUTPATIENT
Start: 2023-09-25 | End: 2023-09-26

## 2023-09-25 RX ORDER — ASPIRIN/CALCIUM CARB/MAGNESIUM 324 MG
81 TABLET ORAL DAILY
Refills: 0 | Status: DISCONTINUED | OUTPATIENT
Start: 2023-09-25 | End: 2023-09-26

## 2023-09-25 RX ORDER — ACETAMINOPHEN 500 MG
650 TABLET ORAL ONCE
Refills: 0 | Status: COMPLETED | OUTPATIENT
Start: 2023-09-25 | End: 2023-09-25

## 2023-09-25 RX ORDER — ASPIRIN/CALCIUM CARB/MAGNESIUM 324 MG
81 TABLET ORAL DAILY
Refills: 0 | Status: DISCONTINUED | OUTPATIENT
Start: 2023-09-25 | End: 2023-09-25

## 2023-09-25 RX ORDER — SODIUM CHLORIDE 9 MG/ML
1000 INJECTION, SOLUTION INTRAVENOUS
Refills: 0 | Status: DISCONTINUED | OUTPATIENT
Start: 2023-09-25 | End: 2023-09-26

## 2023-09-25 RX ORDER — LABETALOL HCL 100 MG
20 TABLET ORAL ONCE
Refills: 0 | Status: COMPLETED | OUTPATIENT
Start: 2023-09-25 | End: 2023-09-25

## 2023-09-25 RX ORDER — GLUCAGON INJECTION, SOLUTION 0.5 MG/.1ML
1 INJECTION, SOLUTION SUBCUTANEOUS ONCE
Refills: 0 | Status: DISCONTINUED | OUTPATIENT
Start: 2023-09-25 | End: 2023-09-26

## 2023-09-25 RX ORDER — NITROGLYCERIN 6.5 MG
0.4 CAPSULE, EXTENDED RELEASE ORAL
Refills: 0 | Status: DISCONTINUED | OUTPATIENT
Start: 2023-09-25 | End: 2023-09-26

## 2023-09-25 RX ORDER — LABETALOL HCL 100 MG
10 TABLET ORAL ONCE
Refills: 0 | Status: COMPLETED | OUTPATIENT
Start: 2023-09-25 | End: 2023-09-25

## 2023-09-25 RX ORDER — INFLUENZA VIRUS VACCINE 15; 15; 15; 15 UG/.5ML; UG/.5ML; UG/.5ML; UG/.5ML
0.7 SUSPENSION INTRAMUSCULAR ONCE
Refills: 0 | Status: DISCONTINUED | OUTPATIENT
Start: 2023-09-25 | End: 2023-09-26

## 2023-09-25 RX ADMIN — Medication 2: at 17:40

## 2023-09-25 RX ADMIN — Medication 0.1 MILLIGRAM(S): at 16:03

## 2023-09-25 RX ADMIN — Medication 20 MILLIGRAM(S): at 04:26

## 2023-09-25 RX ADMIN — Medication 0.1 MILLIGRAM(S): at 09:11

## 2023-09-25 RX ADMIN — Medication 10 MILLIGRAM(S): at 05:49

## 2023-09-25 RX ADMIN — Medication 324 MILLIGRAM(S): at 04:26

## 2023-09-25 RX ADMIN — Medication 0.4 MILLIGRAM(S): at 04:27

## 2023-09-25 RX ADMIN — Medication 0.1 MILLIGRAM(S): at 23:03

## 2023-09-25 RX ADMIN — Medication 650 MILLIGRAM(S): at 04:26

## 2023-09-25 RX ADMIN — ATORVASTATIN CALCIUM 40 MILLIGRAM(S): 80 TABLET, FILM COATED ORAL at 23:04

## 2023-09-25 RX ADMIN — Medication 650 MILLIGRAM(S): at 22:33

## 2023-09-25 RX ADMIN — Medication 4: at 09:36

## 2023-09-25 RX ADMIN — AMLODIPINE BESYLATE 10 MILLIGRAM(S): 2.5 TABLET ORAL at 09:11

## 2023-09-25 RX ADMIN — Medication 81 MILLIGRAM(S): at 14:01

## 2023-09-25 RX ADMIN — Medication 20 MILLIGRAM(S): at 16:55

## 2023-09-25 RX ADMIN — Medication 5: at 12:23

## 2023-09-25 NOTE — CONSULT NOTE ADULT - PROBLEM SELECTOR RECOMMENDATION 2
-cardiac markers x3  -pt received  mg in ED  -continue statin  -outpatient follow up for stress test

## 2023-09-25 NOTE — ED PROVIDER NOTE - OBJECTIVE STATEMENT
64 yo female PMHx HTN, HTN presents to ED c/o chest pain x6 hours. Pain began while cooking. Pain has been present constant, but subsided upon arrival to ED. Radiates to back. Reports normally has chest pain with elevated BP. Given ASA and "a pill for blood pressure, but it doesn't suite me." Last evaluated by cardiologist 12/2022. Unsure of workup, only remember "wires" being placed. Reports compliance with medications. No further complaints at this time.   Denies diaphoresis, nausea/vomiting, SOB.   Meds: Enalparil 20mg bid, Atorvastatin 40mg qd 64 yo female PMHx HTN, HTN, CAD w/ PCI presents to ED c/o chest pain x6 hours. Pain began while cooking. Pain has been present constant, but subsided upon arrival to ED. Radiates to back. Reports normally has chest pain with elevated BP. Given ASA and "a pill for blood pressure, but it doesn't suite me." Last evaluated by cardiologist 12/2022. Unsure of workup, only remembers "wires" being placed. Reports compliance with medications. Per cardiology note from 12/2022 "[facility] 6/2020 : Multivessel CAD (prox LAD 80, mid , distal OM 90, RPLA of RCA 70) - s/p 1 JONATHAN to proximal LAD. Unsuccessful attempt to cross the mid-distal LAD ." Medication recommendations made, but patient does not report taking these medications. No further complaints at this time.   Denies diaphoresis, nausea/vomiting, SOB.   Meds: Enalapril 20mg bid, Atorvastatin 40mg qd 62 yo female PMHx HTN, HTN, CAD s/p PCI w/1 stent presents to ED c/o chest pain x6 hours. Pain began while cooking. Pain has been present constant, but subsided upon arrival to ED. Radiates to back. Reports normally has chest pain with elevated BP. Given ASA and "a pill for blood pressure, but it doesn't suite me." Last evaluated by cardiologist 12/2022. Unsure of workup, only remembers "wires" being placed. Reports compliance with medications. Per cardiology note from 12/2022 "[facility] 6/2020 : Multivessel CAD (prox LAD 80, mid , distal OM 90, RPLA of RCA 70) - s/p 1 JONATHAN to proximal LAD. Unsuccessful attempt to cross the mid-distal LAD ." Medication recommendations made, but patient does not report taking these medications. No further complaints at this time.   Denies diaphoresis, nausea/vomiting, SOB.   Meds: Enalapril 20mg bid, Atorvastatin 40mg qd 62 yo female PMHx HTN, HTN, CAD s/p PCI w/1 stent presents to ED c/o chest pain x6 hours. Pain began while cooking. Pain has been present constant, but subsided upon arrival to ED. Radiates to back. Reports normally has chest pain with elevated BP. Given ASA and "a pill for blood pressure, but it doesn't suite me." Last evaluated by cardiologist 12/2022. Unsure of workup, only remembers "wires" being placed. Reports compliance with medications. Per cardiology note from 12/2022 "[facility] 6/2020 : Multivessel CAD (prox LAD 80, mid , distal OM 90, RPLA of RCA 70) - s/p 1 JONATHAN to proximal LAD. Unsuccessful attempt to cross the mid-distal LAD ." Medication recommendations made, but patient does not report taking these medications. Patient has been admitted before for hypertensive urgency. No further complaints at this time.   Denies diaphoresis, nausea/vomiting, SOB.   Meds: Enalapril 20mg bid, Atorvastatin 40mg qd

## 2023-09-25 NOTE — ED ADULT NURSE NOTE - OBJECTIVE STATEMENT
pt presents to the ED in [place] c/o chest pain that began while cooking. pt states the pain is constant and radiates to the back. pt denies SOB, fever, chills, N/V, lightheadedness/dizziness. pt endorses hx of high blood pressure. pt breathing even and unlabored at this time.

## 2023-09-25 NOTE — ED ADULT NURSE NOTE - NS ED NURSE REPORT GIVEN TO FT
Report given to [facility] CONTRERAS FORREST. Patient A&Ox4, respirations even/unlabored, no apparent distress or change in mental status. Plan of care discussed with patient, all questions answered.

## 2023-09-25 NOTE — CONSULT NOTE ADULT - ASSESSMENT
This is 64 y/o female with hx of CAD s/p stent x1, HTN, HLD who presents with chest pain radiating to the back. Pt staes that she usually gets this kind of pain when her BP is very high. Pt found to have /129. She was treated with IV Labetalol and SL nitro and reports improvement of symptoms. EKG shows SR with LVH and TWI in lateral leads (previously noted). Cardiac cath in 6/2020 revealed multivessel CAD (pLAD 80, mLAD 100, dOM 90, RPLA of RCA 70). JONATHAN to pLAD was placed at the time but attempt to cross mid-distal LAD  was unsuccessful. Pt follows with Dr. Ochoa. On last visit in June she was found hypertensive and admitted to self-discontinuing several medications. She was restarted on Clonidine but again ran out 2 weeks ago.

## 2023-09-25 NOTE — ED PROVIDER NOTE - PROGRESS NOTE DETAILS
JESUS Marino: Cardiology note reviewed from 12/2022. Patient supposed to be on Toprol, Plavix, ASA, but non compliant as medications make patient feel "worse."

## 2023-09-25 NOTE — CONSULT NOTE ADULT - SUBJECTIVE AND OBJECTIVE BOX
[facility]                                              CARDIOLOGY AT [address], [place] [address]                                             Telephone: [phone]. Fax:[phone]                                                       CARDIOLOGY CONSULTATION NOTE                                                                                             History obtained by: Patient and medical record  Community Cardiologist: Dr. Ochoa   obtained: Yes [x  ] No [  ]ED   Reason for Consultation: hypertensive urgency  Available out pt records reviewed: Yes [ x ] No [  ]    Chief complaint:  "My chest was hurting"    HPI:  This is 64 y/o female with hx of CAD s/p stent x1, HTN, HLD who presents with chest pain radiating to the back. Pt staes that she usually gets this kind of pain when her BP is very high. Pt found to have /129. She was treated with IV Labetalol and SL nitro and reports improvement of symptoms. EKG shows SR with LVH and TWI in lateral leads (previously noted). Cardiac cath in 6/2020 revealed multivessel CAD (pLAD 80, mLAD 100, dOM 90, RPLA of RCA 70). JONATHAN to pLAD was placed at the time but attempt to cross mid-distal LAD  was unsuccessful. Pt follows with Dr. Ochoa. On last visit in June she was found hypertensive and admitted to self-discontinuing several medications. She was restarted on Clonidine but again ran out 2 weeks ago.    CARDIAC TESTING   ECHO:  < from: TTE Echo Complete w/ Contrast w/ Doppler (12.21.22 @ 08:06) >  Summary:   1. Technically difficult study.   2. Left ventricular ejection fraction, by visual estimation, is 70 to   75%.   3. Normal global left ventricular systolic function.   4. There is moderate concentric left ventricular hypertrophy.   5. Small LV cavity size.   6. Spectral Doppler shows impaired relaxation pattern of left   ventricular myocardial filling (Grade I diastolic dysfunction).   7. Normal right ventricular size and function.   8. Moderately enlarged left atrium.   9. Mild mitral valve regurgitation.  10. Mild tricuspid regurgitation.  11. Sclerotic aortic valve with normal opening.  12. There is no evidence of pericardial effusion.  13. Compared to the prior TTE study from 6/17/20 interval normalization   of regional wall motion.    Ezra He DO Electronically signed on 12/21/2022 at 9:59:17 AM    < end of copied text >      STRESS: n/a      CATH:   < from: Cardiac Cath Lab - Adult (06.18.20 @ 13:48) >  SUMMARY:  Summary: Addendum 6/22/20: Case reconfirmed to remove No LV gram was  performed; however, a recent echocardiogram demonstrated an EF of 60% and  add EF by Ventriculography was 50%  DIAGNOSTIC IMPRESSIONS: Multivessel CAD ( Proximal LAD 80% , mid % ,  distal OM 90% , RPL of RCA 70%)  INTERVENTIONAL IMPRESSIONS: Unsucessful attempt to cross the mid-distal LAD   ( unable to verify wire position in true lumen)  PCI and JONATHAN of proximal LAD using a 3.5 x 15 mm Synergy stent.  INTERVENTIONAL RECOMMENDATIONS: continue on ASA 81 mg and clopidogrel 600  mg now then 75 mg daily for at least 6 months  Atorvastation to lower LDL < 70  Medical management of distal OM1 and RPL disease ( mild ischemia in RCA  territory)    < end of copied text >      ELECTROPHYSIOLOGY: n/a    PAST MEDICAL HISTORY  No pertinent past medical history    Type 2 diabetes mellitus    Hypertension    Hyperlipidemia    Obesity    COVID-19    CAD in native artery        PAST SURGICAL HISTORY  No significant past surgical history        SOCIAL HISTORY:  lives with children  CIGARETTES:   never smoked  ALCOHOL: denies  DRUGS: denies    FAMILY HISTORY:  FHx: hypertension  Father      Family History of Cardiovascular Disease:  Yes [  ] No [  ]  Coronary Artery Disease in first degree relative: Yes [  ] No [  ]  Sudden Cardiac Death in First degree relative: Yes [  ] No [  ]    HOME MEDICATIONS:  amLODIPine 10 mg oral tablet: 1 tab(s) orally once a day (24 Aug 2020 21:43)  aspirin 81 mg oral delayed release tablet: 1 tab(s) orally once a day (24 Aug 2020 21:43)  atorvastatin 40 mg oral tablet: 1 tab(s) orally once a day (24 Aug 2020 21:43)  cloNIDine 0.1 mg oral tablet: 1 tab(s) orally 2 times a day (26 Aug 2020 12:50)  clopidogrel 75 mg oral tablet: 1 tab(s) orally once a day (24 Aug 2020 21:43)  enalapril 20 mg oral tablet: 1 tab(s) orally once a day (24 Aug 2020 21:43)  metFORMIN 1000 mg oral tablet: 1 tab(s) orally 2 times a day (24 Aug 2020 21:43)  metoprolol tartrate 100 mg oral tablet: 1 tab(s) orally once a day (24 Aug 2020 21:43)      CURRENT CARDIAC MEDICATIONS:  nitroglycerin     SubLingual 0.4 milliGRAM(s) SubLingual every 5 minutes, Stop order after: 3 Doses PRN Chest Pain      CURRENT OTHER MEDICATIONS:      ALLERGIES:   penicillin (Hives; Pruritus)      REVIEW OF SYMPTOMS:   CONSTITUTIONAL: No fever, no chills, no weight loss, no weight gain, no fatigue   ENMT:  No vertigo; No sinus or throat pain  NECK: No pain or stiffness  CARDIOVASCULAR: as per HPI  RESPIRATORY: no Shortness of breath, no cough, no wheezing  : No dysuria, no hematuria   GI: No dark color stool, no nausea, no diarrhea, no constipation, no abdominal pain   NEURO: No headache, no slurred speech   MUSCULOSKELETAL: No joint pain or swelling; No muscle, back, or extremity pain  PSYCH: No agitation, no anxiety.    ALL OTHER REVIEW OF SYSTEMS ARE NEGATIVE.    VITAL SIGNS:  T(C): 36.8 (09-25-23 @ 02:18), Max: 36.8 (09-25-23 @ 02:18)  T(F): 98.2 (09-25-23 @ 02:18), Max: 98.2 (09-25-23 @ 02:18)  HR: 85 (09-25-23 @ 05:31) (85 - 104)  BP: 185/84 (09-25-23 @ 06:16) (175/80 - 259/126)  RR: 20 (09-25-23 @ 05:31) (15 - 20)  SpO2: 95% (09-25-23 @ 05:31) (95% - 98%)    INTAKE AND OUTPUT:       PHYSICAL EXAM:  Constitutional: Comfortable . No acute distress.   HEENT: Atraumatic and normocephalic , neck is supple . no JVD. No carotid bruit.  CNS: A&Ox3. No focal deficits.   Respiratory: CTAB, unlabored   Cardiovascular: RRR normal s1 s2. No murmurs  Gastrointestinal: Soft, non-tender. +Bowel sounds.   Extremities: 2+ Peripheral Pulses, No clubbing, cyanosis, or edema  Psychiatric: Calm . no agitation.   Skin: Warm and dry, no ulcers on extremities     LABS:  ( 25 Sep 2023 04:12 )  Troponin T  <0.01,  CPK  X    , CKMB  X    , BNP X                                  14.3   8.71  )-----------( 282      ( 25 Sep 2023 04:12 )             43.1     09-25    138  |  99  |  10.6  ----------------------------<  331<H>  3.8   |  25.0  |  0.57    Ca    9.4      25 Sep 2023 04:12    TPro  8.1  /  Alb  4.2  /  TBili  0.7  /  DBili  x   /  AST  18  /  ALT  17  /  AlkPhos  145<H>  09-25    PT/INR - ( 25 Sep 2023 04:12 )   PT: 11.1 sec;   INR: 1.00 ratio         PTT - ( 25 Sep 2023 04:12 )  PTT:28.7 sec  Urinalysis Basic - ( 25 Sep 2023 04:12 )    Color: x / Appearance: x / SG: x / pH: x  Gluc: 331 mg/dL / Ketone: x  / Bili: x / Urobili: x   Blood: x / Protein: x / Nitrite: x   Leuk Esterase: x / RBC: x / WBC x   Sq Epi: x / Non Sq Epi: x / Bacteria: x        INTERPRETATION OF TELEMETRY: SR 80's, no events    ECG: SR 92 bpm, LVH, TWI in lateral leads (previously noted)  Prior ECG: Yes [x  ] No [  ]    RADIOLOGY & ADDITIONAL STUDIES:    X-ray:    CT scan:   < from: CT Head No Cont (09.25.23 @ 04:00) >  IMPRESSION:    No acute intracranial abnormality. Stable exam since 2020.    --- End of Report ---      GARY MCMANUS MD; Attending Radiologist  This document has been electronically signed. Sep 25 2023  4:07AM    < end of copied text >    MRI:   US:

## 2023-09-25 NOTE — ED PROVIDER NOTE - CLINICAL SUMMARY MEDICAL DECISION MAKING FREE TEXT BOX
64 yo female PMHx HTN, HTN, CAD s/p PCI w/1 stent presents to ED c/o chest pain x6 hours. Hypertensive upon arrival improved with medications. CT head negative. Upon chart review patient with several previous admissions for hypertensive urgency. Not compliant with recommended medications. Labs only significant for  and glucose 351. Denies h/o DM. Patient admitted to medicine. Cardiology consult placed.

## 2023-09-25 NOTE — H&P ADULT - HISTORY OF PRESENT ILLNESS
62 y/o F with PMH of HTN, HLD, CAD s/p PCI presents with complaints of chest pain. The patient states she began having back pain radiating to the chest and the shoulders. Reports having a headache but denies shortness of breath. Patient states she has not taken her blood pressure medications in two weeks since she ran out.

## 2023-09-25 NOTE — ED ADULT TRIAGE NOTE - GLASGOW COMA SCALE: SCORE, MLM
Ndc (80 Mg/0.8): [address]-93 15 Detail Level: None Include J-Code In Bill: No Amount Injected: 40 mg J-Code:  Ndc (40 Mg/0.8): [address]-58 Lot # (Optional): [id number] Ndc (40 Mg/0.4): [address]-38 J-Code Units: 1 Administered By (Optional): davy Expiration Date (Optional): 11/22 Consent: The risks of pain and injection site reactions were reviewed with the patient prior to the injection.

## 2023-09-25 NOTE — ED ADULT NURSE REASSESSMENT NOTE - NS ED NURSE REASSESS COMMENT FT1
Pt transported to [facility] by RN without incident on tele box, tele tech made aware and verified pt's HR and rhythm is visible on tele monitor. Report given to [facility] RN Jose Manuel FORREST. Patient A&Ox4, respirations even/unlabored, no apparent distress or change in mental status. Plan of care discussed with patient, all questions answered.

## 2023-09-25 NOTE — ED ADULT TRIAGE NOTE - CHIEF COMPLAINT QUOTE
Pt BIBA from home c.o constant chest pain radiating to back and jaw since yesterday. Pt also c.o headache. Denies SOB, N/V. Pt reports hx of hyperlipidemia and HTN.

## 2023-09-25 NOTE — H&P ADULT - NSHPLABSRESULTS_GEN_ALL_CORE
14.3   8.71  )-----------( 282      ( 25 Sep 2023 04:12 )             43.1   09-25    138  |  99  |  10.6  ----------------------------<  331<H>  3.8   |  25.0  |  0.57    Ca    9.4      25 Sep 2023 04:12    TPro  8.1  /  Alb  4.2  /  TBili  0.7  /  DBili  x   /  AST  18  /  ALT  17  /  AlkPhos  145<H>  09-25

## 2023-09-25 NOTE — ED PROVIDER NOTE - LANGUAGE ASSISTANCE NEEDED
Yes-Patient/Caregiver accepts free interpretation services... Detail Level: Simple Instructions: This plan will send the code FBSD to the PM system.  DO NOT or CHANGE the price. Price (Do Not Change): 0.00

## 2023-09-25 NOTE — CONSULT NOTE ADULT - PROBLEM SELECTOR RECOMMENDATION 9
-telemetry monitoring  -continue Enalapril 20 mg bid  -restart Amlodipine 10 mg daily and Clonidine 0.1 mg tid  -Labetalol 10 mg IV for SBP>180  -caution dropping BP>25% within first 24 hours  -compliance with medication emphasized  -consider chest CTA to r/o dissection in setting of back pain  -TTE -telemetry monitoring  -continue Enalapril 20 mg bid  -restart Amlodipine 10 mg daily and Clonidine 0.1 mg tid  -Labetalol 10 mg IV for SBP>180  -caution dropping BP>25% within first 24 hours  -compliance with medication emphasized  -consider chest CTA to r/o dissection in setting of back pain -telemetry monitoring  -continue Enalapril 20 mg bid  -restart Amlodipine 10 mg daily   -Labetalol 10 mg IV for SBP>180  -caution dropping BP>25% within first 24 hours  -compliance with medication emphasized  -consider chest CTA to r/o dissection in setting of back pain

## 2023-09-25 NOTE — ED PROVIDER NOTE - PHYSICAL EXAMINATION
General: In NAD, non-toxic/well-appearing.  Skin: No rashes or lesions. Warm, dry, color normal for race.   Head: Normocephalic/atraumatic.   Eyes: Sclera anicteric, conjunctivae clear b/l. PERRLA, EOMI.   Neck: Supple, FROM. No nuchal rigidity.   Cardio: Tachycardic, rhythm regular. No audible murmur.  PV: No edema of feet/ankles.   Resp: Breath sounds vesicular, symmetrical and without rales, rhonchi or wheezing b/l.  Abd: Non-distended. Soft, non-tender.. No rebound, guarding.   MSK: MAEx4. FROM. Strength 5/5 upper and lower extremities.   Neuro: A&Ox3. Language coherent. No slurred speech. Sensation intact to bilateral upper and lower extremities. Steady gait. General: In NAD, non-toxic/well-appearing.  Skin: No rashes or lesions. Warm, dry, color normal for race.   Head: Normocephalic/atraumatic.   Eyes: Sclera anicteric, conjunctivae clear b/l. PERRLA, EOMI.   Neck: Supple, FROM. No nuchal rigidity.   Cardio: Tachycardic, rhythm regular. No audible murmur.  PV: No edema of feet/ankles. Pulses equal b/l.   Resp: Breath sounds vesicular, symmetrical and without rales, rhonchi or wheezing b/l.  Abd: Non-distended. Soft, non-tender.. No rebound, guarding.   MSK: MAEx4. FROM. Strength 5/5 upper and lower extremities.   Neuro: A&Ox3. Language coherent. No slurred speech. Sensation intact to bilateral upper and lower extremities. Steady gait.

## 2023-09-25 NOTE — H&P ADULT - NSHPPHYSICALEXAM_GEN_ALL_CORE
Vital Signs Last 24 Hrs  T(F): 98.6 (25 Sep 2023 07:39), Max: 98.6 (25 Sep 2023 07:39)  HR: 82 (25 Sep 2023 07:39) (82 - 104)  BP: 198/91 (25 Sep 2023 07:39) (175/80 - 259/126)  RR: 18 (25 Sep 2023 07:39) (15 - 20)  SpO2: 97% (25 Sep 2023 07:39) (95% - 98%)    Physical Exam:  Constitutional: alert and oriented, in no acute distress   Neck: Soft and supple  Respiratory: Clear to auscultation bilaterally, no wheezes or crackles  Cardiovascular: Regular rate and rhythm, no murmurs, gallops, rubs  Gastrointestinal: Soft, non-tender to palpation, +bs  Vascular: 2+ peripheral pulses  Neurological: A/O x 3, no focal neurological deficits  Musculoskeletal: 5/5 strength b/l upper and lower extremities, no lower extremity edema bilaterally

## 2023-09-25 NOTE — CONSULT NOTE ADULT - NS ATTEND AMEND GEN_ALL_CORE FT
nicanor. Spopke with her daughter in law anna on the phone  and patietn together   alvin is hypertensive and has coronary artery disease . she is non compliant with meds.  her glucose is uncongtrolled. HbA1C 13.   she does not follow up with her PCP.  she says [place]CP from [place] is [place] speaking but she does not like to talk to her.    She sees Dr maya for cardiology.  She does not like taking B meds as it padilla snot make her feel good. It makes her feel down.  she was on clonidine. CCBS and BB and ACE.   she came in with uncontrolled Hypertension and cP and back pain.   I discussed with patient and her daughter.  she needs to be compliant with meds.    perhaps avoid drastic loweing of the BP at first goal BP at 130-135/85.   and lower the BP goal once she stays at this for 1-2 weeks.    She may not tolerate clonidine due to CNS depressant effects.   long term meds list should be ACE-I. Norvasc  and labetolol 300 BID.

## 2023-09-25 NOTE — H&P ADULT - ASSESSMENT
62 y/o F with PMH of HTN, HLD, CAD s/p PCI presents with chest pain admitted for hypertensive urgency.     Hypertensive Urgency   - Telemetry monitoring   - BP on arrival to /121  - Cardiology consult appreciated  - Continue Norvasc 10mg daily  - Continue Enalapril 20mg BID  - Continue Clonidine 0.1mg TID  - CTA chest ordered to r/o dissection    HLD/CAD s/p PCI  - Continue Aspirin 81mg daily  - Continue Lipitor 40mg nightly  - Per outpatient records patient self discontinued Plavix    DM  - A1c 13.3  - FS with ISS    DVT ppx  - Lovenox SQ

## 2023-09-25 NOTE — ED ADULT NURSE REASSESSMENT NOTE - NS ED NURSE REASSESS COMMENT FT1
Assumed care of patient at 07:15 as stated in report from CONTRERAS Flowers. Charting as noted. Patient A&Ox4, resp even/unlabored, presents to ED c/o chest pain. At time of assessment, patient denies pain/discomfort, denies CP/SOB. Patient updated on the plan of care, awaiting in pt bed, pt admitted. Stretcher locked in lowest position, IV site flushed w/ NS. No redness, swelling or pain noted to site. No signs of acute distress noted, safety maintained. Pt remains on CM in NSR, rate 76, SpO2 96% on room air. Emotional support provided.

## 2023-09-26 ENCOUNTER — TRANSCRIPTION ENCOUNTER (OUTPATIENT)
Age: 63
End: 2023-09-26

## 2023-09-26 VITALS
DIASTOLIC BLOOD PRESSURE: 73 MMHG | RESPIRATION RATE: 18 BRPM | OXYGEN SATURATION: 94 % | HEART RATE: 80 BPM | TEMPERATURE: 98 F | SYSTOLIC BLOOD PRESSURE: 128 MMHG

## 2023-09-26 DIAGNOSIS — I51.3 INTRACARDIAC THROMBOSIS, NOT ELSEWHERE CLASSIFIED: ICD-10-CM

## 2023-09-26 LAB
ANION GAP SERPL CALC-SCNC: 11 MMOL/L — SIGNIFICANT CHANGE UP (ref 5–17)
BASOPHILS # BLD AUTO: 0.04 K/UL — SIGNIFICANT CHANGE UP (ref 0–0.2)
BASOPHILS NFR BLD AUTO: 0.5 % — SIGNIFICANT CHANGE UP (ref 0–2)
BUN SERPL-MCNC: 14.2 MG/DL — SIGNIFICANT CHANGE UP (ref 8–20)
CALCIUM SERPL-MCNC: 9.3 MG/DL — SIGNIFICANT CHANGE UP (ref 8.4–10.5)
CHLORIDE SERPL-SCNC: 98 MMOL/L — SIGNIFICANT CHANGE UP (ref 96–108)
CHOLEST SERPL-MCNC: 197 MG/DL — SIGNIFICANT CHANGE UP
CO2 SERPL-SCNC: 27 MMOL/L — SIGNIFICANT CHANGE UP (ref 22–29)
CREAT SERPL-MCNC: 0.75 MG/DL — SIGNIFICANT CHANGE UP (ref 0.5–1.3)
EGFR: 88 ML/MIN/1.73M2 — SIGNIFICANT CHANGE UP
EOSINOPHIL # BLD AUTO: 0.17 K/UL — SIGNIFICANT CHANGE UP (ref 0–0.5)
EOSINOPHIL NFR BLD AUTO: 2.1 % — SIGNIFICANT CHANGE UP (ref 0–6)
GLUCOSE BLDC GLUCOMTR-MCNC: 194 MG/DL — HIGH (ref 70–99)
GLUCOSE BLDC GLUCOMTR-MCNC: 274 MG/DL — HIGH (ref 70–99)
GLUCOSE BLDC GLUCOMTR-MCNC: 283 MG/DL — HIGH (ref 70–99)
GLUCOSE SERPL-MCNC: 282 MG/DL — HIGH (ref 70–99)
HCT VFR BLD CALC: 39.1 % — SIGNIFICANT CHANGE UP (ref 34.5–45)
HDLC SERPL-MCNC: 46 MG/DL — LOW
HGB BLD-MCNC: 12.5 G/DL — SIGNIFICANT CHANGE UP (ref 11.5–15.5)
IMM GRANULOCYTES NFR BLD AUTO: 0.1 % — SIGNIFICANT CHANGE UP (ref 0–0.9)
LIPID PNL WITH DIRECT LDL SERPL: 124 MG/DL — HIGH
LYMPHOCYTES # BLD AUTO: 2.62 K/UL — SIGNIFICANT CHANGE UP (ref 1–3.3)
LYMPHOCYTES # BLD AUTO: 32.6 % — SIGNIFICANT CHANGE UP (ref 13–44)
MCHC RBC-ENTMCNC: 26.7 PG — LOW (ref 27–34)
MCHC RBC-ENTMCNC: 32 GM/DL — SIGNIFICANT CHANGE UP (ref 32–36)
MCV RBC AUTO: 83.4 FL — SIGNIFICANT CHANGE UP (ref 80–100)
MONOCYTES # BLD AUTO: 0.62 K/UL — SIGNIFICANT CHANGE UP (ref 0–0.9)
MONOCYTES NFR BLD AUTO: 7.7 % — SIGNIFICANT CHANGE UP (ref 2–14)
NEUTROPHILS # BLD AUTO: 4.57 K/UL — SIGNIFICANT CHANGE UP (ref 1.8–7.4)
NEUTROPHILS NFR BLD AUTO: 57 % — SIGNIFICANT CHANGE UP (ref 43–77)
NON HDL CHOLESTEROL: 151 MG/DL — HIGH
PLATELET # BLD AUTO: 261 K/UL — SIGNIFICANT CHANGE UP (ref 150–400)
POTASSIUM SERPL-MCNC: 4.3 MMOL/L — SIGNIFICANT CHANGE UP (ref 3.5–5.3)
POTASSIUM SERPL-SCNC: 4.3 MMOL/L — SIGNIFICANT CHANGE UP (ref 3.5–5.3)
RBC # BLD: 4.69 M/UL — SIGNIFICANT CHANGE UP (ref 3.8–5.2)
RBC # FLD: 13.5 % — SIGNIFICANT CHANGE UP (ref 10.3–14.5)
SODIUM SERPL-SCNC: 136 MMOL/L — SIGNIFICANT CHANGE UP (ref 135–145)
TRIGL SERPL-MCNC: 137 MG/DL — SIGNIFICANT CHANGE UP
WBC # BLD: 8.03 K/UL — SIGNIFICANT CHANGE UP (ref 3.8–10.5)
WBC # FLD AUTO: 8.03 K/UL — SIGNIFICANT CHANGE UP (ref 3.8–10.5)

## 2023-09-26 PROCEDURE — 99232 SBSQ HOSP IP/OBS MODERATE 35: CPT

## 2023-09-26 PROCEDURE — 99239 HOSP IP/OBS DSCHRG MGMT >30: CPT

## 2023-09-26 PROCEDURE — 93306 TTE W/DOPPLER COMPLETE: CPT | Mod: 26

## 2023-09-26 RX ORDER — AMLODIPINE BESYLATE 2.5 MG/1
1 TABLET ORAL
Qty: 30 | Refills: 0
Start: 2023-09-26 | End: 2023-10-25

## 2023-09-26 RX ORDER — LABETALOL HCL 100 MG
1 TABLET ORAL
Qty: 60 | Refills: 0
Start: 2023-09-26 | End: 2023-10-25

## 2023-09-26 RX ORDER — INSULIN GLARGINE 100 [IU]/ML
10 INJECTION, SOLUTION SUBCUTANEOUS
Qty: 1 | Refills: 0
Start: 2023-09-26 | End: 2023-10-25

## 2023-09-26 RX ORDER — AMLODIPINE BESYLATE 2.5 MG/1
1 TABLET ORAL
Qty: 0 | Refills: 0 | DISCHARGE

## 2023-09-26 RX ORDER — ISOPROPYL ALCOHOL, BENZOCAINE .7; .06 ML/ML; ML/ML
0 SWAB TOPICAL
Qty: 100 | Refills: 1
Start: 2023-09-26

## 2023-09-26 RX ADMIN — Medication 3: at 11:59

## 2023-09-26 RX ADMIN — Medication 3: at 09:52

## 2023-09-26 RX ADMIN — Medication 0.1 MILLIGRAM(S): at 05:39

## 2023-09-26 RX ADMIN — Medication 0.1 MILLIGRAM(S): at 13:55

## 2023-09-26 RX ADMIN — AMLODIPINE BESYLATE 10 MILLIGRAM(S): 2.5 TABLET ORAL at 05:39

## 2023-09-26 RX ADMIN — Medication 1: at 16:25

## 2023-09-26 RX ADMIN — Medication 20 MILLIGRAM(S): at 16:29

## 2023-09-26 RX ADMIN — Medication 81 MILLIGRAM(S): at 11:59

## 2023-09-26 RX ADMIN — Medication 20 MILLIGRAM(S): at 05:39

## 2023-09-26 NOTE — DISCHARGE NOTE PROVIDER - NSDCMRMEDTOKEN_GEN_ALL_CORE_FT
amLODIPine 10 mg oral tablet: 1 tab(s) orally once a day  aspirin 81 mg oral delayed release tablet: 1 tab(s) orally once a day  atorvastatin 40 mg oral tablet: 1 tab(s) orally once a day  cloNIDine 0.1 mg oral tablet: 1 tab(s) orally 2 times a day  clopidogrel 75 mg oral tablet: 1 tab(s) orally once a day  enalapril 20 mg oral tablet: 1 tab(s) orally once a day  metFORMIN 1000 mg oral tablet: 1 tab(s) orally 2 times a day   alcohol swabs: Apply topically to affected area 2 times a day  amLODIPine 10 mg oral tablet: 1 tab(s) orally once a day  aspirin 81 mg oral delayed release tablet: 1 tab(s) orally once a day  atorvastatin 40 mg oral tablet: 1 tab(s) orally once a day  cloNIDine 0.1 mg oral tablet: 1 tab(s) orally 2 times a day  clopidogrel 75 mg oral tablet: 1 tab(s) orally once a day  enalapril 20 mg oral tablet: 1 tab(s) orally once a day  glucometer (per patient&#x27;s insurance): Test blood sugars 2 times a day in morning before breakfast and in evening before bed. Dispense #1 glucometer.  Insulin Pen Needles, 4mm: 1 application subcutaneously 1 times a day. ** Use with insulin pen **  labetalol 300 mg oral tablet: 1 tab(s) orally 2 times a day  lancets: 1 application subcutaneously 2 times a day  metFORMIN 1000 mg oral tablet: 1 tab(s) orally 2 times a day  test strips (per patient&#x27;s insurance): 1 application subcutaneously 2 times a day. ** Compatible with patient&#x27;s glucometer **   alcohol swabs: Apply topically to affected area 2 times a day  amLODIPine 10 mg oral tablet: 1 tab(s) orally once a day  aspirin 81 mg oral delayed release tablet: 1 tab(s) orally once a day  atorvastatin 40 mg oral tablet: 1 tab(s) orally once a day  cloNIDine 0.1 mg oral tablet: 1 tab(s) orally 2 times a day  clopidogrel 75 mg oral tablet: 1 tab(s) orally once a day  enalapril 20 mg oral tablet: 1 tab(s) orally once a day  glucometer (per patient&#x27;s insurance): Test blood sugars 2 times a day in morning before breakfast and in evening before bed. Dispense #1 glucometer.  Insulin Pen Needles, 4mm: 1 application subcutaneously 1 times a day. ** Use with insulin pen **  labetalol 300 mg oral tablet: 1 tab(s) orally 2 times a day  lancets: 1 application subcutaneously 2 times a day  Lantus Solostar Pen 100 units/mL subcutaneous solution: 10 unit(s) subcutaneous once a day (at bedtime) Use 10 U Lantus in evenings, continue to check blood sugar in mornings and evenings  metFORMIN 1000 mg oral tablet: 1 tab(s) orally 2 times a day  test strips (per patient&#x27;s insurance): 1 application subcutaneously 2 times a day. ** Compatible with patient&#x27;s glucometer **

## 2023-09-26 NOTE — DISCHARGE NOTE PROVIDER - NSDCFUADDINST_GEN_ALL_CORE_FT
Patient counseled on NEED for endocrine and cardiology follow up after discharge from hospital as well as PCP follow up.

## 2023-09-26 NOTE — DISCHARGE NOTE PROVIDER - NSDCCPCAREPLAN_GEN_ALL_CORE_FT
PRINCIPAL DISCHARGE DIAGNOSIS  Diagnosis: Hypertensive urgency  Assessment and Plan of Treatment: Patient noncompliant with outpatient medications, Upon resumption of home meds BP normalized   - Enforced strict need for compliance with medical regiment in conjunction with cardiology   - SW consulted for home care / assistance with transport to appointments      SECONDARY DISCHARGE DIAGNOSES  Diagnosis: DM (diabetes mellitus)  Assessment and Plan of Treatment: With hyperglycemia, Uncontrolled, A1c 13  Continue Metformin   - Will start Lantus 10 U, Diabetic Teaching, Follow up with Endocrinology outpatient / PCP    Diagnosis: CAD (coronary artery disease)  Assessment and Plan of Treatment: Chronic condition   - Continue guideline directed therapy / follow up with Cardiology outpatient    Diagnosis: HLD (hyperlipidemia)  Assessment and Plan of Treatment: Chronic condition   - Continue current regiment    Diagnosis: Noncompliance  Assessment and Plan of Treatment: Patient known for noncompliance, counseled on need for close follow up with outpatient providers

## 2023-09-26 NOTE — DISCHARGE NOTE NURSING/CASE MANAGEMENT/SOCIAL WORK - NSDCPEFALRISK_GEN_ALL_CORE
For information on Fall & Injury Prevention, visit: [email or web address] OR  [email or web address] OR  https://www.cdc.gov/steadi/patient.html

## 2023-09-26 NOTE — PROGRESS NOTE ADULT - PROBLEM SELECTOR PLAN 2
- CTA chest aorta shows Small left ventricular apical aneurysm with questionable 0.5 cm thrombus.   - radiologist recommended echocardiogram or cardiac CT to better evaluate.  - echo is read as normal and unchanged in comparison to prior, no LV thrombus noted.

## 2023-09-26 NOTE — DISCHARGE NOTE PROVIDER - HOSPITAL COURSE
Mrs. Borrero is a 64 yo F with a PMH significant for HTN, HLD, CAD s/p PCI who presented to [facility] for Chest pain 9/25/23. She reported back pain radiating to the chest and shoulders which started acutely yesterday. She denied any shortness of breath / palpitations / light headedness/  syncope during admission. Blood pressure was noted to be markedly elevated as high as 259/126 with no signs of end organ damage. Patient seen by Cardiology who know patient very well and who states patient is noncompliant with follow up and medical therapies. Continued on Norvasc 10, Enalapril 20 BID, Clonidine .1 mg TID Mrs. Borrero is a 64 yo F with a PMH significant for HTN, HLD, CAD s/p PCI who presented to [facility] for Chest pain 9/25/23. She reported back pain radiating to the chest and shoulders which started acutely yesterday. She denied any shortness of breath / palpitations / light headedness/  syncope during admission. Blood pressure was noted to be markedly elevated as high as 259/126 with no signs of end organ damage. Patient seen by Cardiology who know patient very well and who states patient is noncompliant with follow up and medical therapies. Continued on Norvasc 10, Enalapril 20 BID, Clonidine .1 mg TID with improvement of BP to normal values by AM. Since then patients BP has remained within normal limits. CT chest ordered to r/o Aortic dissection, however it noted possible LV aneurysm and thrombus of .5 cm, ECHO ordered and reviewed by cardiology, no thrombus noted, no gross abnormalities noted. Definitive study per Cardiology would be Cardiac MRI however patient refused this test. Cleared by Cardiology for discharge with close follow up .Patient noted to have A1c 13.3, hyperglycemia, noncompliant with insulin regiment as well, SW met with patient to assist with home care and give resources for patient to assit with transportation to appointments as needed. Patient is currently medically and hemodynamically stable for discharge home with close cardiology and endocrine follow up.

## 2023-09-26 NOTE — PROGRESS NOTE ADULT - ASSESSMENT
This is 62 y/o female with hx of CAD s/p stent x1, HTN, HLD who presents with chest pain radiating to the back. Pt staes that she usually gets this kind of pain when her BP is very high. Pt found to have /129. She was treated with IV Labetalol and SL nitro and reports improvement of symptoms. EKG shows SR with LVH and TWI in lateral leads (previously noted). Cardiac cath in 6/2020 revealed multivessel CAD (pLAD 80, mLAD 100, dOM 90, RPLA of RCA 70). JONATHAN to pLAD was placed at the time but attempt to cross mid-distal LAD  was unsuccessful. Pt follows with Dr. Ochoa. On last visit in June she was found hypertensive and admitted to self-discontinuing several medications. She was restarted on Clonidine but again ran out 2 weeks ago.

## 2023-09-26 NOTE — DISCHARGE NOTE PROVIDER - PROVIDER TOKENS
PROVIDER:[TOKEN:[[id number]],FOLLOWUP:[2 weeks]],PROVIDER:[TOKEN:[[id number]],FOLLOWUP:[2 weeks]]

## 2023-09-26 NOTE — DISCHARGE NOTE PROVIDER - CARE PROVIDER_API CALL
Juan C Hassan  Cardiovascular Disease  [address], [address]  [place] [address]  Phone: [phone]  Fax: [phone]  Follow Up Time: 2 weeks    Jessica Layton  Endocrinology/Metab/Diabetes  [address] [place] [address]  Phone: [phone]  Fax: [phone]  Follow Up Time: 2 weeks

## 2023-09-26 NOTE — PROGRESS NOTE ADULT - PROBLEM SELECTOR PLAN 1
- pt w/ hypertensive urgency in setting of medication non-compliance  - pt claims she ran out of her medication.   - but she is non compliant with her lifestyle modifications too, her a1c is 13   - she needs diabetes educator consult and endocrine consult   - will control BP w/ norvasc, labetalol, enalapril, and clonidine   - if she is not tolerating clonidine due to CNS depressing effects ok to discontinue  - otherwise no further inpatient workup, needs to be compliant w/ meds and follow up.

## 2023-09-26 NOTE — PROGRESS NOTE ADULT - NS ATTEND AMEND GEN_ALL_CORE FT
Patient seen and examined by me. Patient denies cardiac symptoms. Taking medications in hospital.    Hypertensive urgency  Hypertension uncontrolled  medication non compliance  multivessel CAD (pLAD 80, mLAD 100, dOM 90, RPLA of RCA 70).   hx of JONATHAN to pLAD  hx of failure to cross mLAD   moderate LVH  uncontrolled type 2 DM    T(C): 37 (09-26-23 @ 11:37), Max: 37.1 (09-26-23 @ 05:06)  HR: 79 (09-26-23 @ 11:37) (60 - 82)  BP: 132/75 (09-26-23 @ 11:37) (116/74 - 170/84)  RR: 18 (09-26-23 @ 11:37) (18 - 18)  SpO2: 96% (09-26-23 @ 11:37) (93% - 96%)  Patient alert and awake.  Chest- Bilateral Clear BS  Cardiac- S1 and S2, s3  Abdomen- Soft    This is a pleasant patient who has non compliance of medications. She presented with hypertensive urgency. I'm very concerned with her cardiac trajectory and I discussed this in manuel and full details with the patient. She has known multivessel coronary artery disease and thus far has not established herself to be compliant/reliable with self medication administration.    She is at high risk as a hypertensive, diabetic for cardiac deterioration.    HTN control per JNC guidelines. Her goal BP is <130/90. Her diabetes in uncontrolled.  Recommend Norvasc, Labetalol, Enalapril to max dose. Add on diuretic effect. Do not recommend clonidine for long term especially with history of non compliance.    She is not an appropriate candidate for consideration of renal denervation given the above.    Endocrinology will need to discuss with her type 2 DM management. She requires outpatient vascular and opthamologic examinations.    At this stage i'd recommend having palliative care discuss her goals and wishes with her.     Her BP is improved since admission. Her 2D echo is reviewed and its difficult to appreciate an LV thrombus. If concern remains CMR would be imaging of choice which the patient declined.      She was advised of the following lifestyle cardiovascular recommendations:  If she follows the below, there is a chance at marked improvement in chronic conditions which we discussed. She was in the precontemplation stage for this.  CARDIOVASCULAR LIFESTYLE INSTRUCTIONS:  Advised the patient lifestyle modifications including a plant based, limited salt, low fat, no to minimal dairy diet, stress reduction/psychosomatic management, sleep hygiene/KIRAN testing and healthy weight loss, annual influenza vaccine, medication compliance, high risk behavior mitigation (I.e. tobacco abstinence, alcohol abstinence, recreational/illicit drug use abstinence), increased exercise activity as per AHA/ACC standard for long term cardiovascular risk reduction.    I have discussed my recommendation with the ACP which are outlined above. We will follow peripherally.

## 2023-09-26 NOTE — DISCHARGE NOTE NURSING/CASE MANAGEMENT/SOCIAL WORK - PATIENT PORTAL LINK FT
You can access the FollowMyHealth Patient Portal offered by [facility] by registering at the following website: [email or web address]. By joining [facility]’s FollowMyHealth portal, you will also be able to view your health information using other applications (apps) compatible with our system.

## 2023-09-26 NOTE — PROGRESS NOTE ADULT - SUBJECTIVE AND OBJECTIVE BOX
[facility]                                                         CARDIOLOGY AT [place] [address]                                                         Telephone: [phone]. Fax:[phone]                                                                             PROGRESS NOTE    Reason for admission: Chest pain   Initial reason for Consult: HTN urgency   Reason for follow up: HTN urgency / r/o LV thrombus   Overnight Events: stable     Review of symptoms:   Cardiac:  No chest pain. No dyspnea. No palpitations.  Respiratory: no cough. No dyspnea  Gastrointestinal: No diarrhea. No abdominal pain. No bleeding.   Neuro: No focal neuro complaints.    Vitals:  T(C): 37 (09-26-23 @ 11:37), Max: 37.1 (09-26-23 @ 05:06)  HR: 79 (09-26-23 @ 11:37) (60 - 82)  BP: 132/75 (09-26-23 @ 11:37) (116/74 - 170/84)  RR: 18 (09-26-23 @ 11:37) (18 - 18)  SpO2: 96% (09-26-23 @ 11:37) (93% - 96%)  Wt(kg): --  I&O's Summary    Weight (kg): 90.7 (09-25 @ 02:18)    PHYSICAL EXAM:  Appearance: Comfortable. No acute distress  HEENT:  Atraumatic. Normocephalic.  Normal oral mucosa  Neurologic: A & O x 3, no gross focal deficits.  Cardiovascular: RRR S1 S2, No murmur, no rubs/gallops. No JVD  Respiratory: Lungs clear to auscultation, unlabored   Gastrointestinal:  Soft, Non-tender, + BS  Lower Extremities: 2+ Peripheral Pulses, No clubbing, cyanosis, or edema  Psychiatry: Patient is calm. No agitation.   Skin: warm and dry.    CURRENT CARDIAC MEDICATIONS:  amLODIPine   Tablet 10 milliGRAM(s) Oral daily  cloNIDine 0.1 milliGRAM(s) Oral three times a day  enalapril 20 milliGRAM(s) Oral two times a day  nitroglycerin     SubLingual 0.4 milliGRAM(s) SubLingual every 5 minutes PRN      CURRENT OTHER MEDICATIONS:  atorvastatin 40 milliGRAM(s) Oral at bedtime  dextrose 50% Injectable 25 Gram(s) IV Push once, Stop order after: 1 Doses  dextrose 50% Injectable 25 Gram(s) IV Push once, Stop order after: 1 Doses  dextrose 50% Injectable 12.5 Gram(s) IV Push once, Stop order after: 1 Doses  dextrose Oral Gel 15 Gram(s) Oral once, Stop order after: 1 Doses PRN Blood Glucose LESS THAN 70 milliGRAM(s)/deciliter  glucagon  Injectable 1 milliGRAM(s) IntraMuscular once, Stop order after: 1 Doses  insulin lispro (ADMELOG) corrective regimen sliding scale   SubCutaneous three times a day before meals  aspirin enteric coated 81 milliGRAM(s) Oral daily  dextrose 5%. 1000 milliLiter(s) (100 mL/Hr) IV Continuous <Continuous>  dextrose 5%. 1000 milliLiter(s) (50 mL/Hr) IV Continuous <Continuous>  enoxaparin Injectable 40 milliGRAM(s) SubCutaneous every 24 hours  influenza  Vaccine (HIGH DOSE) 0.7 milliLiter(s) IntraMuscular once      LABS:	 	  ( 25 Sep 2023 04:12 )  Troponin T  <0.01,  CPK  X    , CKMB  X    , BNP X                                  12.5   8.03  )-----------( 261      ( 26 Sep 2023 02:54 )             39.1     09-26    136  |  98  |  14.2  ----------------------------<  282<H>  4.3   |  27.0  |  0.75    Ca    9.3      26 Sep 2023 02:54    TPro  8.1  /  Alb  4.2  /  TBili  0.7  /  DBili  x   /  AST  18  /  ALT  17  /  AlkPhos  145<H>  09-25    PT/INR/PTT ( 25 Sep 2023 04:12 )                       :                       :      11.1         :       28.7                  .        .                   .              .           .       1.00        .                                       Lipid Profile: Date: 09-26 @ 02:54  Total cholesterol 197; Direct LDL: --; HDL: 46; Triglycerides:137    HgA1c:   TSH:

## 2023-10-06 ENCOUNTER — APPOINTMENT (OUTPATIENT)
Dept: CARDIOLOGY | Facility: CLINIC | Age: 63
End: 2023-10-06
Payer: MEDICARE

## 2023-10-06 ENCOUNTER — NON-APPOINTMENT (OUTPATIENT)
Age: 63
End: 2023-10-06

## 2023-10-06 VITALS — BODY MASS INDEX: 32.85 KG/M2 | OXYGEN SATURATION: 96 % | WEIGHT: 174 LBS | HEIGHT: 61 IN | HEART RATE: 70 BPM

## 2023-10-06 VITALS — SYSTOLIC BLOOD PRESSURE: 160 MMHG | DIASTOLIC BLOOD PRESSURE: 82 MMHG

## 2023-10-06 PROCEDURE — 93000 ELECTROCARDIOGRAM COMPLETE: CPT

## 2023-10-06 PROCEDURE — 99214 OFFICE O/P EST MOD 30 MIN: CPT | Mod: 25

## 2023-10-06 RX ORDER — CLONIDINE HYDROCHLORIDE 0.1 MG/1
0.1 TABLET ORAL 3 TIMES DAILY
Qty: 270 | Refills: 0 | Status: DISCONTINUED | COMMUNITY
Start: 2023-06-26 | End: 2023-10-06

## 2023-10-06 RX ORDER — INSULIN GLARGINE 100 [IU]/ML
100 INJECTION, SOLUTION SUBCUTANEOUS DAILY
Refills: 0 | Status: ACTIVE | COMMUNITY

## 2023-10-06 RX ORDER — METFORMIN HYDROCHLORIDE 1000 MG/1
1000 TABLET, COATED ORAL TWICE DAILY
Refills: 0 | Status: ACTIVE | COMMUNITY

## 2023-10-25 NOTE — ED PROVIDER NOTE - PATIENT/CAREGIVER ACCEPTED INTERPRETER SERVICES
From: Vy Hardy  To: Cadence Sims  Sent: 10/24/2023 10:49 AM CDT  Subject: Kidney infection worry?     Good morning,     Last Wednesday, the bladder/UTI pain that I've been in for began again! Because this is something that happens often, I wasn't too worried.     However, Saturday morning the pain got absolutely unbearable, I began to feel a little feverish, and minor left side pain set in.     The side pain has progressed to where it's painful to use the restroom, and I'll occasionally feel an ache/cramp when I walk.     Today and yesterday, I've been so warm and sweaty that I've been walking outside in a tank top!     I took an Azo test strip Monday morning because it began to concern me, to which the results were positive. Yesterday and today I've been very fatigued.     I'm curious what you think my next moves should be? Is it possible that I can get a UTI prescription at a pharmacy in [place]? Should I go in somewhere for further evaluation?     Thank you so much!   
Left message for patient to call back  
Please order a urine dip and culture and she can stop in a give a sample. If she has a fever then I would recommend she been seen because the concern would be that the infection spread from her urinary tract to her kidneys.If she is nauseated, has a fever, or back pain then she should be seen. If not, we can check the urine sample and make plans accordingly.  
Pt active on live well, sent her a message letting her know we have been trying to contact her in regards to the message that she sent us. And to let us know if she will come in for a urine or if she does have a fever, nausea or back pain that we will need to get her on the schedule to be seen.   
Leg Pain
yes

## 2023-11-13 ENCOUNTER — APPOINTMENT (OUTPATIENT)
Dept: CARDIOLOGY | Facility: CLINIC | Age: 63
End: 2023-11-13

## 2023-11-13 PROCEDURE — 36415 COLL VENOUS BLD VENIPUNCTURE: CPT

## 2023-11-13 PROCEDURE — 84484 ASSAY OF TROPONIN QUANT: CPT

## 2023-11-13 PROCEDURE — 93005 ELECTROCARDIOGRAM TRACING: CPT

## 2023-11-13 PROCEDURE — 70450 CT HEAD/BRAIN W/O DYE: CPT | Mod: MA

## 2023-11-13 PROCEDURE — 71045 X-RAY EXAM CHEST 1 VIEW: CPT

## 2023-11-13 PROCEDURE — 85610 PROTHROMBIN TIME: CPT

## 2023-11-13 PROCEDURE — 80061 LIPID PANEL: CPT

## 2023-11-13 PROCEDURE — 83880 ASSAY OF NATRIURETIC PEPTIDE: CPT

## 2023-11-13 PROCEDURE — 83036 HEMOGLOBIN GLYCOSYLATED A1C: CPT

## 2023-11-13 PROCEDURE — 80053 COMPREHEN METABOLIC PANEL: CPT

## 2023-11-13 PROCEDURE — 71275 CT ANGIOGRAPHY CHEST: CPT

## 2023-11-13 PROCEDURE — C8929: CPT

## 2023-11-13 PROCEDURE — 74174 CTA ABD&PLVS W/CONTRAST: CPT

## 2023-11-13 PROCEDURE — 85730 THROMBOPLASTIN TIME PARTIAL: CPT

## 2023-11-13 PROCEDURE — 80048 BASIC METABOLIC PNL TOTAL CA: CPT

## 2023-11-13 PROCEDURE — 82962 GLUCOSE BLOOD TEST: CPT

## 2023-11-13 PROCEDURE — 85025 COMPLETE CBC W/AUTO DIFF WBC: CPT

## 2023-11-13 PROCEDURE — 96374 THER/PROPH/DIAG INJ IV PUSH: CPT

## 2023-11-13 PROCEDURE — 99285 EMERGENCY DEPT VISIT HI MDM: CPT | Mod: 25

## 2023-11-14 ENCOUNTER — APPOINTMENT (OUTPATIENT)
Dept: CARDIOLOGY | Facility: CLINIC | Age: 63
End: 2023-11-14
Payer: MEDICARE

## 2023-11-14 VITALS
TEMPERATURE: 98.4 F | DIASTOLIC BLOOD PRESSURE: 80 MMHG | OXYGEN SATURATION: 97 % | WEIGHT: 179 LBS | SYSTOLIC BLOOD PRESSURE: 166 MMHG | HEART RATE: 91 BPM | BODY MASS INDEX: 33.79 KG/M2 | HEIGHT: 61 IN

## 2023-11-14 PROCEDURE — 99213 OFFICE O/P EST LOW 20 MIN: CPT

## 2023-12-18 ENCOUNTER — APPOINTMENT (OUTPATIENT)
Dept: CARDIOLOGY | Facility: CLINIC | Age: 63
End: 2023-12-18
Payer: MEDICARE

## 2023-12-18 VITALS
HEIGHT: 61 IN | SYSTOLIC BLOOD PRESSURE: 152 MMHG | HEART RATE: 97 BPM | DIASTOLIC BLOOD PRESSURE: 86 MMHG | BODY MASS INDEX: 33.61 KG/M2 | OXYGEN SATURATION: 98 % | WEIGHT: 178 LBS

## 2023-12-18 DIAGNOSIS — E78.5 HYPERLIPIDEMIA, UNSPECIFIED: ICD-10-CM

## 2023-12-18 DIAGNOSIS — I25.10 ATHEROSCLEROTIC HEART DISEASE OF NATIVE CORONARY ARTERY W/OUT ANGINA PECTORIS: ICD-10-CM

## 2023-12-18 PROCEDURE — 99215 OFFICE O/P EST HI 40 MIN: CPT

## 2023-12-18 RX ORDER — LABETALOL HYDROCHLORIDE 300 MG/1
300 TABLET, FILM COATED ORAL
Qty: 180 | Refills: 1 | Status: ACTIVE | COMMUNITY
Start: 1900-01-01 | End: 1900-01-01

## 2023-12-18 RX ORDER — CLONIDINE HYDROCHLORIDE 0.1 MG/1
0.1 TABLET ORAL 3 TIMES DAILY
Qty: 270 | Refills: 0 | Status: ACTIVE | COMMUNITY
Start: 1900-01-01 | End: 1900-01-01

## 2023-12-18 RX ORDER — CLOPIDOGREL BISULFATE 75 MG/1
75 TABLET, FILM COATED ORAL DAILY
Qty: 1 | Refills: 3 | Status: ACTIVE | COMMUNITY
Start: 1900-01-01 | End: 1900-01-01

## 2023-12-18 RX ORDER — AMLODIPINE BESYLATE 5 MG/1
5 TABLET ORAL DAILY
Qty: 90 | Refills: 1 | Status: ACTIVE | COMMUNITY
Start: 2020-03-23 | End: 1900-01-01

## 2023-12-18 RX ORDER — ATORVASTATIN CALCIUM 40 MG/1
40 TABLET, FILM COATED ORAL
Qty: 1 | Refills: 2 | Status: ACTIVE | COMMUNITY
Start: 1900-01-01 | End: 1900-01-01

## 2023-12-18 RX ORDER — ENALAPRIL MALEATE 20 MG/1
20 TABLET ORAL TWICE DAILY
Qty: 180 | Refills: 0 | Status: ACTIVE | COMMUNITY
Start: 2021-03-29 | End: 1900-01-01

## 2024-01-29 ENCOUNTER — APPOINTMENT (OUTPATIENT)
Dept: CARDIOLOGY | Facility: CLINIC | Age: 64
End: 2024-01-29
Payer: MEDICARE

## 2024-01-29 ENCOUNTER — NON-APPOINTMENT (OUTPATIENT)
Age: 64
End: 2024-01-29

## 2024-01-29 VITALS
SYSTOLIC BLOOD PRESSURE: 148 MMHG | HEART RATE: 88 BPM | WEIGHT: 184 LBS | DIASTOLIC BLOOD PRESSURE: 88 MMHG | OXYGEN SATURATION: 98 % | BODY MASS INDEX: 34.74 KG/M2 | HEIGHT: 61 IN

## 2024-01-29 DIAGNOSIS — R60.0 LOCALIZED EDEMA: ICD-10-CM

## 2024-01-29 DIAGNOSIS — I10 ESSENTIAL (PRIMARY) HYPERTENSION: ICD-10-CM

## 2024-01-29 DIAGNOSIS — I73.9 PERIPHERAL VASCULAR DISEASE, UNSPECIFIED: ICD-10-CM

## 2024-01-29 PROCEDURE — 93000 ELECTROCARDIOGRAM COMPLETE: CPT

## 2024-01-29 PROCEDURE — 99215 OFFICE O/P EST HI 40 MIN: CPT | Mod: 25

## 2024-02-09 ENCOUNTER — APPOINTMENT (OUTPATIENT)
Dept: CARDIOLOGY | Facility: CLINIC | Age: 64
End: 2024-02-09
Payer: MEDICARE

## 2024-02-09 PROCEDURE — 93923 UPR/LXTR ART STDY 3+ LVLS: CPT

## 2024-02-09 PROCEDURE — 93925 LOWER EXTREMITY STUDY: CPT

## 2024-02-12 ENCOUNTER — APPOINTMENT (OUTPATIENT)
Dept: CARDIOLOGY | Facility: CLINIC | Age: 64
End: 2024-02-12
Payer: MEDICARE

## 2024-02-12 ENCOUNTER — NON-APPOINTMENT (OUTPATIENT)
Age: 64
End: 2024-02-12

## 2024-02-12 PROCEDURE — 93970 EXTREMITY STUDY: CPT

## 2024-02-14 ENCOUNTER — NON-APPOINTMENT (OUTPATIENT)
Age: 64
End: 2024-02-14

## 2024-04-01 ENCOUNTER — APPOINTMENT (OUTPATIENT)
Dept: CARDIOLOGY | Facility: CLINIC | Age: 64
End: 2024-04-01

## 2024-05-29 NOTE — DISCUSSION/SUMMARY
[Patient] : the patient [With Me] : with me [___ Month(s)] : in [unfilled] month(s) [FreeTextEntry3] : keep BP log book for 1 week and FU with RN  119.9

## 2024-08-05 ENCOUNTER — APPOINTMENT (OUTPATIENT)
Dept: CARDIOLOGY | Facility: CLINIC | Age: 64
End: 2024-08-05

## 2024-10-24 NOTE — ED PROVIDER NOTE - NS_BEDUNITTYPES_ED_ALL_ED
Left detailed message with home care nurse with provider response as shown:        Advise home care RN to return call if patient does want us to send in the nicole 3+. Trev Haas, RN, BSN     TELEMETRY

## 2025-01-23 NOTE — PROGRESS NOTE ADULT - PROVIDER SPECIALTY LIST ADULT
Close orthopedic reduction performed by Dr Marquez. Respiratory Tech present. Charge Nurse present.    Cardiology

## 2025-03-05 ENCOUNTER — EMERGENCY (EMERGENCY)
Facility: HOSPITAL | Age: 65
LOS: 1 days | Discharge: HOME | End: 2025-03-05
Attending: STUDENT IN AN ORGANIZED HEALTH CARE EDUCATION/TRAINING PROGRAM
Payer: COMMERCIAL

## 2025-03-05 VITALS
OXYGEN SATURATION: 97 % | RESPIRATION RATE: 20 BRPM | SYSTOLIC BLOOD PRESSURE: 189 MMHG | DIASTOLIC BLOOD PRESSURE: 91 MMHG | HEART RATE: 96 BPM

## 2025-03-05 VITALS
WEIGHT: 186.07 LBS | HEART RATE: 111 BPM | SYSTOLIC BLOOD PRESSURE: 213 MMHG | RESPIRATION RATE: 20 BRPM | HEIGHT: 61 IN | TEMPERATURE: 99 F | OXYGEN SATURATION: 98 % | DIASTOLIC BLOOD PRESSURE: 104 MMHG

## 2025-03-05 PROCEDURE — 99284 EMERGENCY DEPT VISIT MOD MDM: CPT

## 2025-03-05 PROCEDURE — 99283 EMERGENCY DEPT VISIT LOW MDM: CPT | Mod: 25

## 2025-03-05 PROCEDURE — 93010 ELECTROCARDIOGRAM REPORT: CPT

## 2025-03-05 PROCEDURE — 93005 ELECTROCARDIOGRAM TRACING: CPT

## 2025-03-05 PROCEDURE — T1013: CPT

## 2025-03-05 RX ORDER — AMLODIPINE BESYLATE 10 MG/1
1 TABLET ORAL
Qty: 10 | Refills: 0
Start: 2025-03-05 | End: 2025-03-14

## 2025-03-05 RX ORDER — ENALAPRIL MALEATE 20 MG
1 TABLET ORAL
Qty: 10 | Refills: 0
Start: 2025-03-05 | End: 2025-03-14

## 2025-03-05 RX ORDER — ENALAPRIL MALEATE 20 MG
20 TABLET ORAL ONCE
Refills: 0 | Status: COMPLETED | OUTPATIENT
Start: 2025-03-05 | End: 2025-03-05

## 2025-03-05 RX ORDER — AMLODIPINE BESYLATE 10 MG/1
10 TABLET ORAL ONCE
Refills: 0 | Status: COMPLETED | OUTPATIENT
Start: 2025-03-05 | End: 2025-03-05

## 2025-03-05 RX ADMIN — AMLODIPINE BESYLATE 10 MILLIGRAM(S): 10 TABLET ORAL at 14:41

## 2025-03-05 RX ADMIN — Medication 20 MILLIGRAM(S): at 14:41

## 2025-03-05 NOTE — ED PROVIDER NOTE - OBJECTIVE STATEMENT
65 year old female w/PMHx HTN, HLD, CAD s/p PCI, DM presents to the ED for eval. States she saw her PCP at [facility] today, was noted with an "abnormal EKG" as well as elevated BP 200s systolic. Contrary to triage note patient denies any chest pain, SOB, headache, vision changes, numbness, tingling or weakness. No fevers, chills, nausea, vomiting. Normal bowel and bladder movements. Of note, patient has been non compliant with home meds due to insurance issues since her admission with a similar presentation 2 years ago. Today was the first time to follow up with a PCP since discharge. During her admission was evaluated by cards, started on norvasc, enalapril and clonidine with good response.

## 2025-03-05 NOTE — ED PROVIDER NOTE - PATIENT PORTAL LINK FT
You can access the FollowMyHealth Patient Portal offered by [facility] by registering at the following website: [email or web address]. By joining [employer]’s FollowMyHealth portal, you will also be able to view your health information using other applications (apps) compatible with our system.

## 2025-03-05 NOTE — ED ADULT NURSE NOTE - CHIEF COMPLAINT QUOTE
pt arrived to ED c/o chest pain/hypertension, pt sent from [facility] for abnormal EKG, history of HTN/DM/high cholesterol

## 2025-03-05 NOTE — ED PROVIDER NOTE - PROGRESS NOTE DETAILS
Patient already has a scheduled appt for reevaluation at [facility], daughter at bedside to take her home. BP improved with home meds. Continues to be asymptomatic. Meds sent to pharmacy. Patient already has a scheduled appt for reevaluation at [facility], daughter at bedside to take her home. stable for discharge  Samer Vanda Al MD

## 2025-03-05 NOTE — ED PROVIDER NOTE - CLINICAL SUMMARY MEDICAL DECISION MAKING FREE TEXT BOX
On arrival BP elevated 214 systolic, otherwise HD Stable. Patient is well appearing in no acute distress. EKG without ischemic changes similar to prior EKG in 2023. No indication for further testing, patient likely needs to take her meds. Will restart home meds at the ED and monitor on tele. If good response can be discharged with 2 [facility].

## 2025-03-05 NOTE — ED ADULT TRIAGE NOTE - CHIEF COMPLAINT QUOTE
pt arrived to ED c/o chest pain/hypertension, pt sent from [facility] for abnormal EKG, history of HTN/DM/high cholesterol

## 2025-03-05 NOTE — ED ADULT NURSE NOTE - NSFALLUNIVINTERV_ED_ALL_ED
Bed/Stretcher in lowest position, wheels locked, appropriate side rails in place/Call bell, personal items and telephone in reach/Instruct patient to call for assistance before getting out of bed/chair/stretcher/Non-slip footwear applied when patient is off stretcher/[place]ail to call system/Physically safe environment - no spills, clutter or unnecessary equipment/Purposeful proactive rounding/Room/bathroom lighting operational, light cord in reach

## 2025-03-05 NOTE — ED ADULT NURSE NOTE - OBJECTIVE STATEMENT
Pt A&Ox4, NAD. Pt sent in from [facility] for abnormal EKG. Pt with complaints of CP. Hypertensive on arrival. Breathing even and unlabored. Placed on CM.

## 2025-03-05 NOTE — ED PROVIDER NOTE - ATTENDING CONTRIBUTION TO CARE
.  65-year-old female past ministry of hypertension, hyperlipidemia, CAD, diabetes presents with concern for elevated blood pressure.  Patient saw her PCP today at [facility] was diagnosed with a "abnormal EKG.  As well as elevated blood pressures.  Patient has been nonadherent with her home medications.  Contrary to triage note.  Patient is completely asymptomatic.  No chest pain no shortness of breath no headache no vision changes no neurologic deficits.  On exam, patient is well-appearing, heart rate regular no appreciable murmurs rubs or gallops, abdomen soft and nontender, no lower extremity edema or calf tenderness.  Given patient is completely asymptomatic and like elevated blood pressures likely secondary to nonadherence, blood pressure treated with medications that patient was prescribed during last admission.  2 out of the 3 blood pressure medications ordered so as not to drop blood pressure too low.  Blood pressure improved, patient remains asymptomatic.  EKG normal sinus rhythm normal axis normal intervals T wave inversions in lead I and aVL similar to previous.  Long-term adverse effects of uncontrolled blood pressure including renal disease heart disease and stroke discussed with patient and family at bedside who understand and will follow-up outpatient.  Importance of medication adherence emphasized.

## 2025-03-11 NOTE — ED PROVIDER NOTE - MUSCULOSKELETAL, MLM
Spine appears normal, range of motion is not limited, no muscle or joint tenderness, no back tenderness
Oriented - self; Oriented - place; Oriented - time

## 2025-06-19 NOTE — ED PROVIDER NOTE - IV ALTEPLASE DOOR HIDDEN
Sski Pregnancy And Lactation Text: This medication is Pregnancy Category D and isn't considered safe during pregnancy. It is excreted in breast milk.
show
(4) excellent